# Patient Record
Sex: FEMALE | Race: WHITE | NOT HISPANIC OR LATINO | Employment: OTHER | URBAN - METROPOLITAN AREA
[De-identification: names, ages, dates, MRNs, and addresses within clinical notes are randomized per-mention and may not be internally consistent; named-entity substitution may affect disease eponyms.]

---

## 2017-03-09 ENCOUNTER — OFFICE VISIT (OUTPATIENT)
Dept: INTERNAL MEDICINE | Facility: CLINIC | Age: 67
End: 2017-03-09
Payer: MEDICARE

## 2017-03-09 ENCOUNTER — TELEPHONE (OUTPATIENT)
Dept: INTERNAL MEDICINE | Facility: CLINIC | Age: 67
End: 2017-03-09

## 2017-03-09 VITALS
SYSTOLIC BLOOD PRESSURE: 120 MMHG | OXYGEN SATURATION: 96 % | HEART RATE: 100 BPM | WEIGHT: 184.31 LBS | BODY MASS INDEX: 36.18 KG/M2 | DIASTOLIC BLOOD PRESSURE: 60 MMHG | HEIGHT: 60 IN

## 2017-03-09 DIAGNOSIS — J06.9 VIRAL URI WITH COUGH: Primary | ICD-10-CM

## 2017-03-09 PROCEDURE — 99213 OFFICE O/P EST LOW 20 MIN: CPT | Mod: PBBFAC | Performed by: INTERNAL MEDICINE

## 2017-03-09 PROCEDURE — 99999 PR PBB SHADOW E&M-EST. PATIENT-LVL III: CPT | Mod: PBBFAC,,, | Performed by: INTERNAL MEDICINE

## 2017-03-09 PROCEDURE — 99213 OFFICE O/P EST LOW 20 MIN: CPT | Mod: S$PBB,,, | Performed by: INTERNAL MEDICINE

## 2017-03-09 NOTE — PROGRESS NOTES
Internal Medicine    Subjective:      Patient ID: Daisy Severino is a 67 y.o. female.    Chief Complaint: Cough    HPI Comments: Presents for urgent visit for URI.      Last Saturday went to [place] and states she got very cold.  Monday around noon became nauseated and spit up a lot of phlegm.  States her ears and throat were sore.  Reports development of nasal congestion, runny nose, post-nasal drip, and non-productive cough.  She says she has been using a previously prescribed nasal spray that is helping some.  Vicks nasal rub also helps some.  Also taking Tylenol PRN for muscle aches.  Has been using some old antibiotic drops given to by her ENT last year.  Endorses chills but denies fever or night sweats.        Review of Systems   Constitutional: Positive for chills and fatigue. Negative for appetite change, fever and unexpected weight change.   HENT: Positive for congestion, postnasal drip, rhinorrhea and sore throat. Negative for ear pain (Ear congestion), hearing loss and trouble swallowing.    Eyes: Negative for visual disturbance.   Respiratory: Positive for cough. Negative for chest tightness, shortness of breath and wheezing.    Cardiovascular: Negative for chest pain, palpitations and leg swelling.   Gastrointestinal: Negative for abdominal pain.   Genitourinary: Negative for dysuria.   Musculoskeletal: Positive for myalgias.   Skin: Negative for rash.   Neurological: Negative for dizziness, weakness, numbness and headaches.   Hematological: Negative for adenopathy.   Psychiatric/Behavioral: Negative for behavioral problems.       Past medical history, surgical history, and family medical history reviewed and updated as appropriate.    Medications and allergies reviewed.     Objective:     /60  Pulse 100  Ht 5' (1.524 m)  Wt 83.6 kg (184 lb 4.9 oz)  SpO2 96%  BMI 35.99 kg/m2  Physical Exam   Constitutional: She is oriented to person, place, and time. She appears well-developed and  well-nourished. No distress.   HENT:   Head: Normocephalic and atraumatic.   Mouth/Throat: Oropharynx is clear and moist. No oropharyngeal exudate.   Some clear fluid behind bilateral TM's.  No erythema, discharge, or bulging TM's.  External ear canal's clear.     Eyes: Conjunctivae and EOM are normal. No scleral icterus.   Neck: No thyromegaly present.   Cardiovascular: Normal rate and regular rhythm.  Exam reveals no gallop and no friction rub.    No murmur heard.  Pulmonary/Chest: Effort normal and breath sounds normal. No respiratory distress. She has no wheezes. She has no rales.   Abdominal: Soft.   Musculoskeletal: She exhibits no edema or tenderness.   Lymphadenopathy:     She has no cervical adenopathy.   Neurological: She is alert and oriented to person, place, and time.   Skin: No rash noted.   Psychiatric: She has a normal mood and affect. Her behavior is normal.       Assessment:     1. Viral URI with cough        Plan:   Daisy was seen today for cough.    Diagnoses and all orders for this visit:    Viral URI with cough   Discussed over the counter options for symptoms - written instructions provided to patient.  Educated patient on signs/symptoms of bacterial infection.  Patient agrees to notify me if symptoms worsen, new symptoms start, or if she develops fever.      Return if symptoms worsen or fail to improve.    Celine Fuentes MD  Internal Medicine  [facility]

## 2017-03-09 NOTE — TELEPHONE ENCOUNTER
----- Message from Tammy Reed sent at 3/9/2017 12:24 PM CST -----  Contact: Self/[phone]  Type: Sooner appointment than  is able to schedule    When is the first available appointment? 5/16/2017    What is the nature of the appointment?Physical     What appointment type: Ep    Comments:Pt stated that is due for her Physical this month and needs refills on her medication pt wants to know if she can be seen before the next available. Please call and advise      Thank you

## 2017-03-09 NOTE — PATIENT INSTRUCTIONS
Muscle aches:  Tylenol (acetaminophen - do not take more than 3000mg per day)    Nasal decongestant:  1. Sudafed (pseudoephedrine or phenylephrine), 2. Afrin nasal spray (oxymetazoline) - do not use nasal spray longer than 3 days    Cough:  Dextromethorphan    DayQuil:  Acetaminophen, dextromethorphan, and phenylephrine  NyQuil:  Acetaminopheb, dextromethorphan, and doxylamine

## 2017-03-09 NOTE — MR AVS SNAPSHOT
[facility] - Internal Medicine  [address]  [place] [address]  Phone: [phone]  Fax: [phone]                  Daisy Severino   3/9/2017 3:00 PM   Office Visit    Description:  Female : 1950   Provider:  Celine Fuentes MD   Department:  [facility] - Internal Medicine           Reason for Visit     Cough                To Do List           Goals (5 Years of Data)     None      Follow-Up and Disposition     Return if symptoms worsen or fail to improve.      [facility]     [facility] On Call Nurse Care Line -  Assistance  Registered nurses in the [facility] On Call Center provide clinical advisement, health education, appointment booking, and other advisory services.  Call for this free service at [phone].             Medications           Message regarding Medications     Verify the changes and/or additions to your medication regime listed below are the same as discussed with your clinician today.  If any of these changes or additions are incorrect, please notify your healthcare provider.             Verify that the below list of medications is an accurate representation of the medications you are currently taking.  If none reported, the list may be blank. If incorrect, please contact your healthcare provider. Carry this list with you in case of emergency.           Current Medications     estradiol (ESTRACE) 0.5 MG tablet Take 1 tablet (0.5 mg total) by mouth once daily.    fish oil-omega-3 fatty acids 300-1,000 mg capsule Take 2 g by mouth once daily.    lisinopril (PRINIVIL,ZESTRIL) 20 MG tablet Take 1 tablet (20 mg total) by mouth once daily.    LORATADINE (CLARITIN ORAL) Take by mouth.    multivitamin (ONE DAILY MULTIVITAMIN) per tablet Take 1 tablet by mouth once daily.    triamterene-hydrochlorothiazide 37.5-25 mg (DYAZIDE) 37.5-25 mg per capsule Take 1 capsule by mouth every morning.           Clinical Reference Information           Your Vitals Were     BP Pulse  Height Weight SpO2 BMI    120/60 100 5' (1.524 m) 83.6 kg (184 lb 4.9 oz) 96% 35.99 kg/m2      Blood Pressure          Most Recent Value    BP  120/60      Allergies as of 3/9/2017     Codeine      Immunizations Administered on Date of Encounter - 3/9/2017     None      Instructions    Muscle aches:  Tylenol (acetaminophen - do not take more than 3000mg per day)    Nasal decongestant:  1. Sudafed (pseudoephedrine or phenylephrine), 2. Afrin nasal spray (oxymetazoline) - do not use nasal spray longer than 3 days    Cough:  Dextromethorphan    DayQuil:  Acetaminophen, dextromethorphan, and phenylephrine  NyQuil:  Acetaminopheb, dextromethorphan, and doxylamine         Language Assistance Services     ATTENTION: Language assistance services are available, free of charge. Please call [phone].      ATENCIÓN: Si habla español, tiene a simons disposición servicios gratuitos de asistencia lingüística. Llame al [phone].     CHÚ Ý: N?u b?n nói Ti?ng Vi?t, có các d?ch v? h? tr? ngôn ng? mi?n phí dành cho b?n. G?i s? [phone].         [facility] complies with applicable Federal civil rights laws and does not discriminate on the basis of race, color, national origin, age, disability, or sex.

## 2017-03-10 NOTE — TELEPHONE ENCOUNTER
----- Message from Glynn Ramirez sent at 3/10/2017  1:11 PM CST -----  Contact: Self [phone]  Type: Returning a call    Who left a message? Sylvia    When did the practice call? Yesterday    Comments:Please call back    Thanks

## 2017-03-21 ENCOUNTER — PATIENT MESSAGE (OUTPATIENT)
Dept: INTERNAL MEDICINE | Facility: CLINIC | Age: 67
End: 2017-03-21

## 2017-03-21 DIAGNOSIS — I10 ESSENTIAL HYPERTENSION: ICD-10-CM

## 2017-03-21 DIAGNOSIS — E78.5 HYPERLIPIDEMIA, UNSPECIFIED HYPERLIPIDEMIA TYPE: ICD-10-CM

## 2017-03-21 DIAGNOSIS — R73.03 BORDERLINE DIABETES MELLITUS: Primary | ICD-10-CM

## 2017-03-22 NOTE — TELEPHONE ENCOUNTER
Pt needs R refill 90 day supply. Please advise.  Pharmacy updated. Please advise pt's message as well.

## 2017-03-28 ENCOUNTER — TELEPHONE (OUTPATIENT)
Dept: INTERNAL MEDICINE | Facility: CLINIC | Age: 67
End: 2017-03-28

## 2017-03-28 RX ORDER — TRIAMTERENE AND HYDROCHLOROTHIAZIDE 37.5; 25 MG/1; MG/1
1 CAPSULE ORAL EVERY MORNING
Qty: 90 CAPSULE | Refills: 1 | Status: SHIPPED | OUTPATIENT
Start: 2017-03-28 | End: 2017-09-27 | Stop reason: SDUPTHER

## 2017-03-28 RX ORDER — LISINOPRIL 20 MG/1
20 TABLET ORAL DAILY
Qty: 90 TABLET | Refills: 1 | Status: SHIPPED | OUTPATIENT
Start: 2017-03-28 | End: 2017-09-29 | Stop reason: SDUPTHER

## 2017-03-28 NOTE — TELEPHONE ENCOUNTER
----- Message from Tammy Reed sent at 3/28/2017 11:06 AM CDT -----  Contact: Self/[phone]  Pt said that she is calling in regards to needing to speak with someone about checking the status of a message that she sent via [facility] on 3/21/2017 asking for some new rx's, pt stated that if she does not answer leave a message letting her know if everything is okay. Please call and advise            Thank you

## 2017-04-12 ENCOUNTER — TELEPHONE (OUTPATIENT)
Dept: INTERNAL MEDICINE | Facility: CLINIC | Age: 67
End: 2017-04-12

## 2017-04-12 NOTE — TELEPHONE ENCOUNTER
Called in pt's two prescriptions lisinopril and (DYAZIDE) to the [facility]. Pt's Rx was not received via e-prescribe and pt will run out of medication soon. Pt notified and will contact pt with any other questions or concerns.

## 2017-04-12 NOTE — TELEPHONE ENCOUNTER
----- Message from Fatimah Stevens sent at 4/11/2017 11:08 AM CDT -----  Contact: Self/[phone]  Pt is calling in regards to a problem with scripts and her mail order delivery for her prescriptions.  Please advise/

## 2017-04-18 ENCOUNTER — TELEPHONE (OUTPATIENT)
Dept: INTERNAL MEDICINE | Facility: CLINIC | Age: 67
End: 2017-04-18

## 2017-04-18 NOTE — TELEPHONE ENCOUNTER
----- Message from Demar Estrada sent at 4/17/2017 10:32 AM CDT -----  Contact: self   Patient is returning a phone call.  Who left a message for the patient: Sylvia   Does patient know what this is regarding:  RX refill   Comments:

## 2017-04-19 ENCOUNTER — TELEPHONE (OUTPATIENT)
Dept: INTERNAL MEDICINE | Facility: CLINIC | Age: 67
End: 2017-04-19

## 2017-04-19 NOTE — TELEPHONE ENCOUNTER
----- Message from Jaci Chan sent at 4/19/2017 10:35 AM CDT -----  Contact: patient [phone]  Pt said that she has been trying to return your call for several days and has been unable to reach you. She will send a message on the patient portal as well.

## 2017-05-11 DIAGNOSIS — Z79.890 POSTMENOPAUSAL HRT (HORMONE REPLACEMENT THERAPY): ICD-10-CM

## 2017-05-11 DIAGNOSIS — Z12.31 VISIT FOR SCREENING MAMMOGRAM: Primary | ICD-10-CM

## 2017-05-11 RX ORDER — ESTRADIOL 0.5 MG/1
0.5 TABLET ORAL DAILY
Qty: 90 TABLET | Refills: 3 | Status: CANCELLED | OUTPATIENT
Start: 2017-05-11 | End: 2018-05-11

## 2017-05-11 RX ORDER — ESTRADIOL 0.5 MG/1
0.5 TABLET ORAL DAILY
Qty: 90 TABLET | Refills: 3 | Status: SHIPPED | OUTPATIENT
Start: 2017-05-11 | End: 2018-04-03 | Stop reason: SDUPTHER

## 2017-05-16 ENCOUNTER — HOSPITAL ENCOUNTER (OUTPATIENT)
Dept: RADIOLOGY | Facility: HOSPITAL | Age: 67
Discharge: HOME | End: 2017-05-16
Attending: OBSTETRICS & GYNECOLOGY
Payer: MEDICARE

## 2017-05-16 DIAGNOSIS — Z12.31 VISIT FOR SCREENING MAMMOGRAM: ICD-10-CM

## 2017-05-16 PROCEDURE — 77063 BREAST TOMOSYNTHESIS BI: CPT | Mod: 26,,, | Performed by: RADIOLOGY

## 2017-05-16 PROCEDURE — 77067 SCR MAMMO BI INCL CAD: CPT | Mod: TC

## 2017-05-16 PROCEDURE — 77067 SCR MAMMO BI INCL CAD: CPT | Mod: 26,,, | Performed by: RADIOLOGY

## 2017-05-19 ENCOUNTER — OFFICE VISIT (OUTPATIENT)
Dept: INTERNAL MEDICINE | Facility: CLINIC | Age: 67
End: 2017-05-19
Payer: MEDICARE

## 2017-05-19 ENCOUNTER — HOSPITAL ENCOUNTER (OUTPATIENT)
Dept: RADIOLOGY | Facility: HOSPITAL | Age: 67
Discharge: HOME | End: 2017-05-19
Attending: INTERNAL MEDICINE
Payer: MEDICARE

## 2017-05-19 VITALS
BODY MASS INDEX: 37.23 KG/M2 | SYSTOLIC BLOOD PRESSURE: 120 MMHG | DIASTOLIC BLOOD PRESSURE: 60 MMHG | OXYGEN SATURATION: 98 % | HEIGHT: 60 IN | WEIGHT: 189.63 LBS | HEART RATE: 85 BPM

## 2017-05-19 DIAGNOSIS — Z12.11 SCREENING FOR MALIGNANT NEOPLASM OF COLON: ICD-10-CM

## 2017-05-19 DIAGNOSIS — K76.0 FATTY LIVER: ICD-10-CM

## 2017-05-19 DIAGNOSIS — I10 ESSENTIAL HYPERTENSION: ICD-10-CM

## 2017-05-19 DIAGNOSIS — R73.09 ELEVATED HEMOGLOBIN A1C: ICD-10-CM

## 2017-05-19 DIAGNOSIS — M54.2 NECK AND SHOULDER PAIN: ICD-10-CM

## 2017-05-19 DIAGNOSIS — M25.519 NECK AND SHOULDER PAIN: ICD-10-CM

## 2017-05-19 DIAGNOSIS — M54.2 NECK AND SHOULDER PAIN: Primary | ICD-10-CM

## 2017-05-19 DIAGNOSIS — Z00.00 ENCOUNTER FOR HEALTH MAINTENANCE EXAMINATION: ICD-10-CM

## 2017-05-19 DIAGNOSIS — E78.5 HYPERLIPIDEMIA, UNSPECIFIED HYPERLIPIDEMIA TYPE: ICD-10-CM

## 2017-05-19 DIAGNOSIS — M25.519 NECK AND SHOULDER PAIN: Primary | ICD-10-CM

## 2017-05-19 DIAGNOSIS — Z85.528 HISTORY OF RENAL CARCINOMA: ICD-10-CM

## 2017-05-19 DIAGNOSIS — Z78.0 POSTMENOPAUSAL: ICD-10-CM

## 2017-05-19 PROCEDURE — 72040 X-RAY EXAM NECK SPINE 2-3 VW: CPT | Mod: 26,,, | Performed by: RADIOLOGY

## 2017-05-19 PROCEDURE — 99999 PR PBB SHADOW E&M-EST. PATIENT-LVL IV: CPT | Mod: PBBFAC,,, | Performed by: INTERNAL MEDICINE

## 2017-05-19 PROCEDURE — 72040 X-RAY EXAM NECK SPINE 2-3 VW: CPT | Mod: TC

## 2017-05-19 PROCEDURE — 99214 OFFICE O/P EST MOD 30 MIN: CPT | Mod: S$PBB,,, | Performed by: INTERNAL MEDICINE

## 2017-05-19 RX ORDER — METFORMIN HYDROCHLORIDE 500 MG/1
500 TABLET ORAL
Qty: 90 TABLET | Refills: 3 | Status: SHIPPED | OUTPATIENT
Start: 2017-05-19 | End: 2017-10-06

## 2017-05-19 NOTE — PROGRESS NOTES
Subjective:       Patient ID: Daisy Severino is a 67 y.o. female.    Chief Complaint: Annual Exam    HPI  68 y/o woman with HTN, HLD, borderline DM, h/o renal carcinoma here for annual exam; also concerned about new shoulder/neck pain. Her boyfriend is with her today at visit.    Shoulder and neck pain - started about two months ago in middle of neck, radiating to both shoulders, sometimes all the way down both hands. Currently worse on left side, sometimes also on right. Pain is aching, not burning, shocklike, or pins & needles. No numbness.   Few weeks ago had pain significant enough that she wasn't able to raise her arms; also sometimes feels she has weakness in her arm. Has tried massage.   No recent injury, strain. Is concerned about problems caused by repetitive movements.   Does take ibuprofen, but only if she is in excruciating pain.  Icing regularly which she feels helps better than heat.  Has tried OTC topical treatments - capsaicin (too much burning), Dr Cavazos's. Also bought [employer] cream.  Has had similar pain in the past, years ago - around 5378-2542 after an injury/strain.     HTN - taking lisinopril 20mg, triamterene-HCTZ 37.5-35mg, following low salt diet. No headache, vision changes, chest pain, or dyspnea.  Not currently checking BP at home.     H/o HLD - , HDL 56,  on recent labs. Planning to continue working on diet/exercise.    Borderline DM, has A1c followed regularly; most recently 6.2 on 5/16/17.  Doing some morning stretches but not otherwise exercise    H/o renal carcinoma - diagnosed with ultrasound and follow-up CT, stage I carcinoma. Had complete nephrectomy with Dr Stepan Ramirez; surgery and post-op course complicated with ICU stay, collapsed lung.  Saw Dr Ramirez 8/2016, planned for ultrasound, labs - instructed to f/u in 1 year.    Ultrasound done last year at  showed possible fatty liver - liver was normal size but increased echogenicity.    Interested in  working on weight loss, but wants to work on her shoulder pain.   Used to do Ron before her renal cancer diagnosis.    Takes hormone therapy for menopausal symptoms, for >10 years; at visit last year with Gyn was planning to wean this down.    HCM  DEXA - normal years ago, due for this  Mammogram done 3/2016  Pap - had ASCUS on pap last year, follows with Dr Wiedemann  Doesn't want to get colonoscopy yet, asks about cologuard.    Review of Systems   Constitutional: Negative for activity change and fever.   HENT: Negative for congestion and sore throat.    Eyes: Negative for visual disturbance.   Respiratory: Negative for cough and shortness of breath.    Cardiovascular: Negative for chest pain, palpitations and leg swelling.   Gastrointestinal: Negative.  Negative for abdominal pain and blood in stool.   Endocrine: Negative.    Genitourinary: Negative.    Musculoskeletal: Positive for arthralgias, back pain and neck pain. Negative for gait problem and joint swelling.   Skin: Negative for rash.   Neurological: Negative for weakness and numbness.   Psychiatric/Behavioral: Negative for dysphoric mood. The patient is not nervous/anxious.          Past medical history, surgical history, and family medical history reviewed and updated as appropriate.    Medications and allergies reviewed.     Objective:          Vitals:    05/19/17 1448   BP: 120/60   Pulse: 85   SpO2: 98%   Weight: 86 kg (189 lb 9.5 oz)   Height: 5' (1.524 m)     Body mass index is 37.03 kg/(m^2).  Physical Exam   Constitutional: She is oriented to person, place, and time. She appears well-developed and well-nourished. No distress.   Pleasant overweight woman, comfortable and conversant.    HENT:   Head: Normocephalic and atraumatic.   Nose: Nose normal. No mucosal edema.   Mouth/Throat: Oropharynx is clear and moist. No posterior oropharyngeal erythema.   Eyes: Conjunctivae and EOM are normal. Pupils are equal, round, and reactive to light. No scleral  icterus.   Neck: Normal range of motion. Neck supple. No JVD present. No thyromegaly present.   Cardiovascular: Normal rate, regular rhythm, normal heart sounds and intact distal pulses.    No murmur heard.  Pulmonary/Chest: Effort normal and breath sounds normal. No respiratory distress.   Abdominal: Soft. Bowel sounds are normal. She exhibits no distension. There is no tenderness.   Musculoskeletal: Normal range of motion. She exhibits tenderness (tenderness diffusely across shoulders and base of neck). She exhibits no edema.   Full ROM of shoulders and neck though with some pain/stiffness. Neg drop arm test. Pain but not weakness with empty can test, bilateral.   Lymphadenopathy:     She has no cervical adenopathy.   Neurological: She is alert and oriented to person, place, and time. She has normal strength. No cranial nerve deficit or sensory deficit. Gait normal.   Skin: Skin is warm and dry. No rash noted. She is not diaphoretic.   Psychiatric: She has a normal mood and affect.   Vitals reviewed.      Lab Results   Component Value Date    WBC 7.49 05/16/2017    HGB 14.4 05/16/2017    HCT 44.7 05/16/2017     05/16/2017    CHOL 241 (H) 05/16/2017    TRIG 177 (H) 05/16/2017    HDL 56 05/16/2017    ALT 19 05/16/2017    AST 19 05/16/2017     05/16/2017    K 4.7 05/16/2017     05/16/2017    CREATININE 1.2 05/16/2017    BUN 32 (H) 05/16/2017    CO2 26 05/16/2017    HGBA1C 6.2 05/16/2017       Assessment:       1. Neck and shoulder pain    2. Elevated hemoglobin A1c    3. Fatty liver    4. Encounter for health maintenance examination    5. Screening for malignant neoplasm of colon    6. Postmenopausal    7. Essential hypertension    8. Hyperlipidemia, unspecified hyperlipidemia type    9. History of renal carcinoma        Plan:   Daisy was seen today for annual exam.    Diagnoses and all orders for this visit:    Neck and shoulder pain - suspect muscle spasm, possible rotator cuff tendonitis due to  overuse; however, given some midline tenderness and that she is concerned about bone lesions related to h/o renal cancer, will check c-spine xray to further evaluate.  Recommended avoidance of overuse / exacerbating activities, but +gentle ROM exercises. Ok to continue tylenol; she will check with her urologist re: use of topical NSAIDs (voltaren or OTC preparations). Continue coldpack. Referring to PMR as well  -     X-Ray Cervical Spine AP And Lateral; Future  -     Ambulatory Referral to Physical Medicine Rehab    Elevated hemoglobin A1c - discussed options for treatment / monitoring; after in-depth discussion, she agrees to start low-dose metformin in addition to working on diet/exercise changes. Recheck A1c in 6 months  -     metformin (GLUCOPHAGE) 500 MG tablet; Take 1 tablet (500 mg total) by mouth daily with breakfast.  -     Hemoglobin A1c; Future    Fatty liver - counseled re: fatty liver, working on healthy diet/exercise and weight loss; she is motivated to do this. Will monitor LFTs q6 months; she gets ultrasound yearly for kidney monitoring.  -     Lipid panel; Future  -     Comprehensive metabolic panel; Future    Encounter for health maintenance examination  -     Pneumococcal Conjugate Vaccine (13 Valent) (IM)    Screening for malignant neoplasm of colon  -     Occult Blood Stool, CA Screen; Future  -     Occult Blood Stool, CA Screen; Future  -     Occult Blood Stool, CA Screen; Future    Postmenopausal  -     DXA Bone Density Spine And Hip; Future    Essential hypertension - at goal, continue current meds    Hyperlipidemia, unspecified hyperlipidemia type - 10yr ASCVD risk 7.2% and she does not want to start a statin, not strictly recommended by guidelines. Discussed importance of diet/lifestyle changes. Will check lipids with labs in 6 months.     History of renal carcinoma - continue to follow with Urology.     Health maintenance reviewed with patient as noted above.     Return in about 6 months  (around 11/19/2017) for follow up .    Hammad Ware MD  Internal Medicine  [facility]  5/19/2017

## 2017-05-19 NOTE — MR AVS SNAPSHOT
[facility]  [address]  [place] [address]  Phone: [phone]  Fax: [phone]                  Daisy Severino   2017 3:00 PM   Office Visit    Description:  Female : 1950   Provider:  Hammad Ware MD   Department:  [facility]           Reason for Visit     Annual Exam           Diagnoses this Visit        Comments    Neck and shoulder pain    -  Primary     Elevated hemoglobin A1c         Encounter for health maintenance examination         Screening for malignant neoplasm of colon         Fatty liver         Postmenopausal                To Do List           Goals (5 Years of Data)     None      Follow-Up and Disposition     Return in about 6 months (around 2017) for follow up .       These Medications        Disp Refills Start End    metformin (GLUCOPHAGE) 500 MG tablet 90 tablet 3 2017    Take 1 tablet (500 mg total) by mouth daily with breakfast. - Oral    Pharmacy: [facility] - [place], [place] - [address] AT [address] [facility] Ph #: [phone]         [facility]     [facility] On Call Nurse Care Line -  Assistance  Unless otherwise directed by your provider, please contact [facility], our nurse care line that is available for  assistance.     Registered nurses in the [facility] On Call Center provide: appointment scheduling, clinical advisement, health education, and other advisory services.  Call: [phone] (toll free)               Medications           Message regarding Medications     Verify the changes and/or additions to your medication regime listed below are the same as discussed with your clinician today.  If any of these changes or additions are incorrect, please notify your healthcare provider.        START taking these NEW medications        Refills    metformin (GLUCOPHAGE) 500 MG tablet 3    Sig: Take 1 tablet (500 mg total) by  mouth daily with breakfast.    Class: Normal    Route: Oral           Verify that the below list of medications is an accurate representation of the medications you are currently taking.  If none reported, the list may be blank. If incorrect, please contact your healthcare provider. Carry this list with you in case of emergency.           Current Medications     estradiol (ESTRACE) 0.5 MG tablet Take 1 tablet (0.5 mg total) by mouth once daily.    fish oil-omega-3 fatty acids 300-1,000 mg capsule Take 2 g by mouth once daily.    lisinopril (PRINIVIL,ZESTRIL) 20 MG tablet Take 1 tablet (20 mg total) by mouth once daily.    LORATADINE (CLARITIN ORAL) Take by mouth.    multivitamin (ONE DAILY MULTIVITAMIN) per tablet Take 1 tablet by mouth once daily.    triamterene-hydrochlorothiazide 37.5-25 mg (DYAZIDE) 37.5-25 mg per capsule Take 1 capsule by mouth every morning.    metformin (GLUCOPHAGE) 500 MG tablet Take 1 tablet (500 mg total) by mouth daily with breakfast.           Clinical Reference Information           Your Vitals Were     BP Pulse Height Weight SpO2 BMI    120/60 5 5' (1.524 m) 86 kg (189 lb 9.5 oz) 98% 37.03 kg/m2      Blood Pressure          Most Recent Value    BP  120/60      Allergies as of 5/19/2017     Codeine      Immunizations Administered on Date of Encounter - 5/19/2017     Name Date Dose VIS Date Route    Pneumococcal Conjugate - 13 Valent 5/19/2017 0.5 mL 11/5/2015 Intramuscular      Orders Placed During Today's Visit      Normal Orders This Visit    Ambulatory Referral to Physical Medicine Rehab     Pneumococcal Conjugate Vaccine (13 Valent) (IM)     Future Labs/Procedures Expected by Expires    Comprehensive metabolic panel  5/19/2017 5/19/2018    DXA Bone Density Spine And Hip  5/19/2017 8/17/2017    Lipid panel  5/19/2017 7/18/2018    Occult Blood Stool, CA Screen  5/19/2017 5/19/2018    Occult Blood Stool, CA Screen  5/19/2017 5/19/2018    Occult Blood Stool, CA Screen  5/19/2017  5/19/2018    X-Ray Cervical Spine AP And Lateral  5/19/2017 8/17/2017    Hemoglobin A1c  11/15/2017 (Approximate) 12/25/2017      Instructions      Understanding Carbohydrates, Fats, and Protein  Food is a source of fuel and nourishment for your body. Its also a source of pleasure. Having diabetes doesnt mean you have to eat special foods or give up desserts. Instead, your dietitian can show you how to plan meals to suit your body. To start, learn how different foods affect blood sugar.  Carbohydrates  Carbohydrates are the main source of fuel for the body. Carbohydrates raise blood sugar. Many people think carbohydrates are only found in pasta or bread. But carbohydrates are actually in many kinds of foods:  · Sugars occur naturally in foods such as fruit, milk, honey, and molasses. Sugars can also be added to many foods, from cereals and yogurt to candy and desserts. Sugars raise blood sugar.  · Starches are found in bread, cereals, pasta, and dried beans. Theyre also found in corn, peas, potatoes, yam, acorn squash, and butternut squash. Starches also raise blood sugar.   · Fiber is found in foods such as vegetables, fruits, beans, and whole grains. Unlike other carbs, fiber isnt digested or absorbed. So it doesnt raise blood sugar. In fact, fiber can help keep blood sugar from rising too fast. It also helps keep blood cholesterol at a healthy level.  Did you know?  Even though carbohydrates raise blood sugar, its best to have some in every meal. They are an important part of a healthy diet.   Fat  Fat is an energy source that can be stored until needed. Fat does not raise blood sugar. However, it can raise blood cholesterol, increasing the risk of heart disease. Fat is also high in calories, which can cause weight gain. Not all types of fat are the same.  More Healthy:  · Monounsaturated fats are mostly found in vegetable oils, such as olive, canola, and peanut oils. They are also found in avocados and  some nuts. Monounsaturated fats are healthy for your heart. Thats because they lower LDL (unhealthy) cholesterol.  · Polyunsaturated fats are mostly found in vegetable oils, such as corn, safflower, and soybean oils. They are also found in some seeds, nuts, and fish. Polyunsaturated fats lower LDL (unhealthy) cholesterol. So, choosing them instead of saturated fats is healthy for your heart. Certain unsaturated fats can help lower triglycerides.   Less Healthy:  · Saturated fats are found in animal products, such as meat, poultry, whole milk, lard, and butter. Saturated fats raise LDL cholesterol and are not healthy for your heart.  · Hydrogenated oils and trans fats are formed when vegetable oils are processed into solid fats. They are found in many processed foods. Hydrogenated oils and trans fats raise LDL cholesterol and lower HDL (healthy) cholesterol. They are not healthy for your heart.  Protein  Protein helps the body build and repair muscle and other tissue. Protein has little or no effect on blood sugar. However, many foods that contain protein also contain saturated fat. By choosing low-fat protein sources, you can get the benefits of protein without the extra fat:  · Plant protein is found in dry beans and peas, nuts, and soy products, such as tofu and soymilk. These sources tend to be cholesterol-free and low in saturated fat.  · Animal protein is found in fish, poultry, meat, cheese, milk, and eggs. These contain cholesterol and can be high in saturated fat. Aim for lean, lower-fat choices.  Date Last Reviewed: 3/1/2016  © 2139-5136 [employer]. [address] [place] [address], [place] [address]. All rights reserved. This information is not intended as a substitute for professional medical care. Always follow your healthcare professional's instructions.            Metformin tablets  What is this medicine?  METFORMIN (met FOR min) is used to treat type 2 diabetes. It helps to control blood sugar.  Treatment is combined with diet and exercise. This medicine can be used alone or with other medicines for diabetes.  How should I use this medicine?  Take this medicine by mouth. Take it with meals. Swallow the tablets with a drink of water. Follow the directions on the prescription label. Take your medicine at regular intervals. Do not take your medicine more often than directed.  Talk to your pediatrician regarding the use of this medicine in children. While this drug may be prescribed for children as young as 10 years of age for selected conditions, precautions do apply.  What side effects may I notice from receiving this medicine?  Side effects that you should report to your doctor or health care professional as soon as possible:  · allergic reactions like skin rash, itching or hives, swelling of the face, lips, or tongue  · breathing problems  · feeling faint or lightheaded, falls  · muscle aches or pains  · signs and symptoms of low blood sugar such as feeling anxious, confusion, dizziness, increased hunger, unusually weak or tired, sweating, shakiness, cold, irritable, headache, blurred vision, fast heartbeat, loss of consciousness  · slow or irregular heartbeat  · unusual stomach pain or discomfort  · unusually tired or weak  Side effects that usually do not require medical attention (report to your doctor or health care professional if they continue or are bothersome):  · diarrhea  · headache  · heartburn  · metallic taste in mouth  · nausea  · stomach gas, upset  What may interact with this medicine?  Do not take this medicine with any of the following medications:  · dofetilide  · gatifloxacin  · certain contrast medicines given before X-rays, CT scans, MRI, or other procedures  This medicine may also interact with the following medications:  · acetazolamide  · certain medicines for HIV infection or hepatitis, like adefovir, emtricitabine, entecavir, lamivudine, or  tenofovir  · cimetidine  · crizotinib  · digoxin  · diuretics  · female hormones, like estrogens or progestins and birth control pills  · glycopyrrolate  · isoniazid  · lamotrigine  · medicines for blood pressure, heart disease, irregular heart beat  · memantine  · midodrine  · methazolamide  · morphine  · nicotinic acid  · phenothiazines like chlorpromazine, mesoridazine, prochlorperazine, thioridazine  · phenytoin  · procainamide  · propantheline  · quinidine  · quinine  · ranitidine  · ranolazine  · steroid medicines like prednisone or cortisone  · stimulant medicines for attention disorders, weight loss, or to stay awake  · thyroid medicines  · topiramate  · trimethoprim  · trospium  · vancomycin  · vandetanib  · zonisamide  What if I miss a dose?  If you miss a dose, take it as soon as you can. If it is almost time for your next dose, take only that dose. Do not take double or extra doses.  Where should I keep my medicine?  Keep out of the reach of children.  Store at room temperature between 15 and 30 degrees C (59 and 86 degrees F). Protect from moisture and light. Throw away any unused medicine after the expiration date.  What should I tell my health care provider before I take this medicine?  They need to know if you have any of these conditions:  · anemia  · frequently drink alcohol-containing beverages  · become easily dehydrated  · heart attack  · heart failure that is treated with medications  · kidney disease  · liver disease  · polycystic ovary syndrome  · serious infection or injury  · vomiting  · an unusual or allergic reaction to metformin, other medicines, foods, dyes, or preservatives  · pregnant or trying to get pregnant  · breast-feeding  What should I watch for while using this medicine?  Visit your doctor or health care professional for regular checks on your progress.  A test called the HbA1C (A1C) will be monitored. This is a simple blood test. It measures your blood sugar control over the  last 2 to 3 months. You will receive this test every 3 to 6 months.  Learn how to check your blood sugar. Learn the symptoms of low and high blood sugar and how to manage them.  Always carry a quick-source of sugar with you in case you have symptoms of low blood sugar. Examples include hard sugar candy or glucose tablets. Make sure others know that you can choke if you eat or drink when you develop serious symptoms of low blood sugar, such as seizures or unconsciousness. They must get medical help at once.  Tell your doctor or health care professional if you have high blood sugar. You might need to change the dose of your medicine. If you are sick or exercising more than usual, you might need to change the dose of your medicine.  Do not skip meals. Ask your doctor or health care professional if you should avoid alcohol. Many nonprescription cough and cold products contain sugar or alcohol. These can affect blood sugar.  This medicine may cause ovulation in premenopausal women who do not have regular monthly periods. This may increase your chances of becoming pregnant. You should not take this medicine if you become pregnant or think you may be pregnant. Talk with your doctor or health care professional about your birth control options while taking this medicine. Contact your doctor or health care professional right away if think you are pregnant.  If you are going to need surgery, a MRI, CT scan, or other procedure, tell your doctor that you are taking this medicine. You may need to stop taking this medicine before the procedure.  Wear a medical ID bracelet or chain, and carry a card that describes your disease and details of your medicine and dosage times.  Date Last Reviewed:   NOTE:This sheet is a summary. It may not cover all possible information. If you have questions about this medicine, talk to your doctor, pharmacist, or health care provider. Copyright© 2016 [employer]             Language Assistance  Services     ATTENTION: Language assistance services are available, free of charge. Please call [phone].      ATENCIÓN: Si habla español, tiene a simons disposición servicios gratuitos de asistencia lingüística. Llame al [phone].     CHÚ Ý: N?u b?n nói Ti?ng Vi?t, có các d?ch v? h? tr? ngôn ng? mi?n phí dành cho b?n. G?i s? [phone].         [facility] complies with applicable Federal civil rights laws and does not discriminate on the basis of race, color, national origin, age, disability, or sex.

## 2017-05-19 NOTE — PATIENT INSTRUCTIONS
Understanding Carbohydrates, Fats, and Protein  Food is a source of fuel and nourishment for your body. Its also a source of pleasure. Having diabetes doesnt mean you have to eat special foods or give up desserts. Instead, your dietitian can show you how to plan meals to suit your body. To start, learn how different foods affect blood sugar.  Carbohydrates  Carbohydrates are the main source of fuel for the body. Carbohydrates raise blood sugar. Many people think carbohydrates are only found in pasta or bread. But carbohydrates are actually in many kinds of foods:  · Sugars occur naturally in foods such as fruit, milk, honey, and molasses. Sugars can also be added to many foods, from cereals and yogurt to candy and desserts. Sugars raise blood sugar.  · Starches are found in bread, cereals, pasta, and dried beans. Theyre also found in corn, peas, potatoes, yam, acorn squash, and butternut squash. Starches also raise blood sugar.   · Fiber is found in foods such as vegetables, fruits, beans, and whole grains. Unlike other carbs, fiber isnt digested or absorbed. So it doesnt raise blood sugar. In fact, fiber can help keep blood sugar from rising too fast. It also helps keep blood cholesterol at a healthy level.  Did you know?  Even though carbohydrates raise blood sugar, its best to have some in every meal. They are an important part of a healthy diet.   Fat  Fat is an energy source that can be stored until needed. Fat does not raise blood sugar. However, it can raise blood cholesterol, increasing the risk of heart disease. Fat is also high in calories, which can cause weight gain. Not all types of fat are the same.  More Healthy:  · Monounsaturated fats are mostly found in vegetable oils, such as olive, canola, and peanut oils. They are also found in avocados and some nuts. Monounsaturated fats are healthy for your heart. Thats because they lower LDL (unhealthy) cholesterol.  · Polyunsaturated fats are mostly  found in vegetable oils, such as corn, safflower, and soybean oils. They are also found in some seeds, nuts, and fish. Polyunsaturated fats lower LDL (unhealthy) cholesterol. So, choosing them instead of saturated fats is healthy for your heart. Certain unsaturated fats can help lower triglycerides.   Less Healthy:  · Saturated fats are found in animal products, such as meat, poultry, whole milk, lard, and butter. Saturated fats raise LDL cholesterol and are not healthy for your heart.  · Hydrogenated oils and trans fats are formed when vegetable oils are processed into solid fats. They are found in many processed foods. Hydrogenated oils and trans fats raise LDL cholesterol and lower HDL (healthy) cholesterol. They are not healthy for your heart.  Protein  Protein helps the body build and repair muscle and other tissue. Protein has little or no effect on blood sugar. However, many foods that contain protein also contain saturated fat. By choosing low-fat protein sources, you can get the benefits of protein without the extra fat:  · Plant protein is found in dry beans and peas, nuts, and soy products, such as tofu and soymilk. These sources tend to be cholesterol-free and low in saturated fat.  · Animal protein is found in fish, poultry, meat, cheese, milk, and eggs. These contain cholesterol and can be high in saturated fat. Aim for lean, lower-fat choices.  Date Last Reviewed: 3/1/2016  © 1826-3151 [employer]. [address] [place] [address]. All rights reserved. This information is not intended as a substitute for professional medical care. Always follow your healthcare professional's instructions.            Metformin tablets  What is this medicine?  METFORMIN (met FOR min) is used to treat type 2 diabetes. It helps to control blood sugar. Treatment is combined with diet and exercise. This medicine can be used alone or with other medicines for diabetes.  How should I use this  medicine?  Take this medicine by mouth. Take it with meals. Swallow the tablets with a drink of water. Follow the directions on the prescription label. Take your medicine at regular intervals. Do not take your medicine more often than directed.  Talk to your pediatrician regarding the use of this medicine in children. While this drug may be prescribed for children as young as 10 years of age for selected conditions, precautions do apply.  What side effects may I notice from receiving this medicine?  Side effects that you should report to your doctor or health care professional as soon as possible:  · allergic reactions like skin rash, itching or hives, swelling of the face, lips, or tongue  · breathing problems  · feeling faint or lightheaded, falls  · muscle aches or pains  · signs and symptoms of low blood sugar such as feeling anxious, confusion, dizziness, increased hunger, unusually weak or tired, sweating, shakiness, cold, irritable, headache, blurred vision, fast heartbeat, loss of consciousness  · slow or irregular heartbeat  · unusual stomach pain or discomfort  · unusually tired or weak  Side effects that usually do not require medical attention (report to your doctor or health care professional if they continue or are bothersome):  · diarrhea  · headache  · heartburn  · metallic taste in mouth  · nausea  · stomach gas, upset  What may interact with this medicine?  Do not take this medicine with any of the following medications:  · dofetilide  · gatifloxacin  · certain contrast medicines given before X-rays, CT scans, MRI, or other procedures  This medicine may also interact with the following medications:  · acetazolamide  · certain medicines for HIV infection or hepatitis, like adefovir, emtricitabine, entecavir, lamivudine, or tenofovir  · cimetidine  · crizotinib  · digoxin  · diuretics  · female hormones, like estrogens or progestins and birth control  pills  · glycopyrrolate  · isoniazid  · lamotrigine  · medicines for blood pressure, heart disease, irregular heart beat  · memantine  · midodrine  · methazolamide  · morphine  · nicotinic acid  · phenothiazines like chlorpromazine, mesoridazine, prochlorperazine, thioridazine  · phenytoin  · procainamide  · propantheline  · quinidine  · quinine  · ranitidine  · ranolazine  · steroid medicines like prednisone or cortisone  · stimulant medicines for attention disorders, weight loss, or to stay awake  · thyroid medicines  · topiramate  · trimethoprim  · trospium  · vancomycin  · vandetanib  · zonisamide  What if I miss a dose?  If you miss a dose, take it as soon as you can. If it is almost time for your next dose, take only that dose. Do not take double or extra doses.  Where should I keep my medicine?  Keep out of the reach of children.  Store at room temperature between 15 and 30 degrees C (59 and 86 degrees F). Protect from moisture and light. Throw away any unused medicine after the expiration date.  What should I tell my health care provider before I take this medicine?  They need to know if you have any of these conditions:  · anemia  · frequently drink alcohol-containing beverages  · become easily dehydrated  · heart attack  · heart failure that is treated with medications  · kidney disease  · liver disease  · polycystic ovary syndrome  · serious infection or injury  · vomiting  · an unusual or allergic reaction to metformin, other medicines, foods, dyes, or preservatives  · pregnant or trying to get pregnant  · breast-feeding  What should I watch for while using this medicine?  Visit your doctor or health care professional for regular checks on your progress.  A test called the HbA1C (A1C) will be monitored. This is a simple blood test. It measures your blood sugar control over the last 2 to 3 months. You will receive this test every 3 to 6 months.  Learn how to check your blood sugar. Learn the symptoms of  low and high blood sugar and how to manage them.  Always carry a quick-source of sugar with you in case you have symptoms of low blood sugar. Examples include hard sugar candy or glucose tablets. Make sure others know that you can choke if you eat or drink when you develop serious symptoms of low blood sugar, such as seizures or unconsciousness. They must get medical help at once.  Tell your doctor or health care professional if you have high blood sugar. You might need to change the dose of your medicine. If you are sick or exercising more than usual, you might need to change the dose of your medicine.  Do not skip meals. Ask your doctor or health care professional if you should avoid alcohol. Many nonprescription cough and cold products contain sugar or alcohol. These can affect blood sugar.  This medicine may cause ovulation in premenopausal women who do not have regular monthly periods. This may increase your chances of becoming pregnant. You should not take this medicine if you become pregnant or think you may be pregnant. Talk with your doctor or health care professional about your birth control options while taking this medicine. Contact your doctor or health care professional right away if think you are pregnant.  If you are going to need surgery, a MRI, CT scan, or other procedure, tell your doctor that you are taking this medicine. You may need to stop taking this medicine before the procedure.  Wear a medical ID bracelet or chain, and carry a card that describes your disease and details of your medicine and dosage times.  Date Last Reviewed:   NOTE:This sheet is a summary. It may not cover all possible information. If you have questions about this medicine, talk to your doctor, pharmacist, or health care provider. Copyright© 2016 [employer]

## 2017-05-22 ENCOUNTER — PATIENT MESSAGE (OUTPATIENT)
Dept: INTERNAL MEDICINE | Facility: CLINIC | Age: 67
End: 2017-05-22

## 2017-05-22 ENCOUNTER — TELEPHONE (OUTPATIENT)
Dept: INTERNAL MEDICINE | Facility: CLINIC | Age: 67
End: 2017-05-22

## 2017-05-22 DIAGNOSIS — R73.03 BORDERLINE DIABETES MELLITUS: ICD-10-CM

## 2017-05-22 DIAGNOSIS — E66.01 SEVERE OBESITY (BMI 35.0-35.9 WITH COMORBIDITY): ICD-10-CM

## 2017-05-22 DIAGNOSIS — I10 ESSENTIAL HYPERTENSION: Primary | ICD-10-CM

## 2017-05-22 DIAGNOSIS — E78.5 HYPERLIPIDEMIA, UNSPECIFIED HYPERLIPIDEMIA TYPE: ICD-10-CM

## 2017-05-22 NOTE — TELEPHONE ENCOUNTER
Please give patient contact information for Daisy Pittman (health ) and for the Medical Fitness program at [place]. Referral placed for med fitness program.  Message also sent to patient

## 2017-05-23 ENCOUNTER — PATIENT MESSAGE (OUTPATIENT)
Dept: UROLOGY | Facility: CLINIC | Age: 67
End: 2017-05-23

## 2017-05-24 ENCOUNTER — PATIENT MESSAGE (OUTPATIENT)
Dept: INTERNAL MEDICINE | Facility: CLINIC | Age: 67
End: 2017-05-24

## 2017-05-24 DIAGNOSIS — Z90.5 S/P NEPHRECTOMY: ICD-10-CM

## 2017-05-24 DIAGNOSIS — Z85.528 HISTORY OF RENAL CARCINOMA: Primary | ICD-10-CM

## 2017-05-25 ENCOUNTER — HOSPITAL ENCOUNTER (OUTPATIENT)
Dept: RADIOLOGY | Facility: CLINIC | Age: 67
Discharge: HOME | End: 2017-05-25
Attending: INTERNAL MEDICINE
Payer: MEDICARE

## 2017-05-25 DIAGNOSIS — Z78.0 POSTMENOPAUSAL: ICD-10-CM

## 2017-05-25 PROCEDURE — 77080 DXA BONE DENSITY AXIAL: CPT | Mod: TC

## 2017-05-25 PROCEDURE — 77080 DXA BONE DENSITY AXIAL: CPT | Mod: 26,,, | Performed by: INTERNAL MEDICINE

## 2017-05-26 ENCOUNTER — TELEPHONE (OUTPATIENT)
Dept: INTERNAL MEDICINE | Facility: CLINIC | Age: 67
End: 2017-05-26

## 2017-05-26 RX ORDER — DICLOFENAC SODIUM 10 MG/G
2 GEL TOPICAL DAILY
Qty: 100 G | Refills: 0 | Status: SHIPPED | OUTPATIENT
Start: 2017-05-26 | End: 2017-07-07

## 2017-06-01 ENCOUNTER — HOSPITAL ENCOUNTER (OUTPATIENT)
Dept: RADIOLOGY | Facility: HOSPITAL | Age: 67
Discharge: HOME | End: 2017-06-01
Attending: PHYSICAL MEDICINE & REHABILITATION
Payer: MEDICARE

## 2017-06-01 ENCOUNTER — INITIAL CONSULT (OUTPATIENT)
Dept: PHYSICAL MEDICINE AND REHAB | Facility: CLINIC | Age: 67
End: 2017-06-01
Payer: MEDICARE

## 2017-06-01 VITALS
DIASTOLIC BLOOD PRESSURE: 74 MMHG | WEIGHT: 189.63 LBS | HEIGHT: 60 IN | BODY MASS INDEX: 37.23 KG/M2 | HEART RATE: 72 BPM | SYSTOLIC BLOOD PRESSURE: 127 MMHG

## 2017-06-01 DIAGNOSIS — M54.2 NECK AND SHOULDER PAIN: ICD-10-CM

## 2017-06-01 DIAGNOSIS — M25.519 NECK AND SHOULDER PAIN: ICD-10-CM

## 2017-06-01 DIAGNOSIS — M54.12 CERVICAL RADICULAR PAIN: ICD-10-CM

## 2017-06-01 DIAGNOSIS — M47.22 OSTEOARTHRITIS OF SPINE WITH RADICULOPATHY, CERVICAL REGION: ICD-10-CM

## 2017-06-01 DIAGNOSIS — M54.12 CERVICAL RADICULAR PAIN: Primary | ICD-10-CM

## 2017-06-01 PROCEDURE — 73030 X-RAY EXAM OF SHOULDER: CPT | Mod: TC,LT

## 2017-06-01 PROCEDURE — 99204 OFFICE O/P NEW MOD 45 MIN: CPT | Mod: S$PBB,,, | Performed by: PHYSICAL MEDICINE & REHABILITATION

## 2017-06-01 PROCEDURE — 99999 PR PBB SHADOW E&M-EST. PATIENT-LVL III: CPT | Mod: PBBFAC,,, | Performed by: PHYSICAL MEDICINE & REHABILITATION

## 2017-06-01 PROCEDURE — 73030 X-RAY EXAM OF SHOULDER: CPT | Mod: 26,LT,, | Performed by: RADIOLOGY

## 2017-06-01 RX ORDER — TRAMADOL HYDROCHLORIDE 50 MG/1
50 TABLET ORAL EVERY 8 HOURS PRN
Qty: 60 TABLET | Refills: 0 | Status: SHIPPED | OUTPATIENT
Start: 2017-06-01 | End: 2017-07-01

## 2017-06-01 RX ORDER — GABAPENTIN 100 MG/1
100 CAPSULE ORAL 3 TIMES DAILY
Qty: 90 CAPSULE | Refills: 1 | Status: SHIPPED | OUTPATIENT
Start: 2017-06-01 | End: 2018-04-03

## 2017-06-01 NOTE — LETTER
June 4, 2017      Hammad Ware MD  [address]  [facility] [address]           [facility]  [address]  [facility] [address]  Phone: [phone]          Patient: Daisy Severino   MR Number: [id number]   YOB: 1950   Date of Visit: 6/1/2017       Dear Dr. Hammad Ware:    Thank you for referring Daisy Severino to me for evaluation. Attached you will find relevant portions of my assessment and plan of care.    If you have questions, please do not hesitate to call me. I look forward to following Daisy Severino along with you.    Sincerely,        Enclosure  CC:  No Recipients    If you would like to receive this communication electronically, please contact [email or web address] or [phone] to request more information on [email or web address] Link access.    For providers and/or their staff who would like to refer a patient to [facility], please contact us through our one-stop-shop provider referral line, Destiny Quispe, at [phone].    If you feel you have received this communication in error or would no longer like to receive these types of communications, please e-mail [email or web address]
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
## 2017-06-02 ENCOUNTER — CLINICAL SUPPORT (OUTPATIENT)
Dept: INTERNAL MEDICINE | Facility: CLINIC | Age: 67
End: 2017-06-02
Payer: MEDICARE

## 2017-06-02 VITALS — BODY MASS INDEX: 37.33 KG/M2 | WEIGHT: 191.13 LBS

## 2017-06-02 NOTE — PROGRESS NOTES
Date:   6/2/17                   Time: 1300  Initial Health  Contact - [x]Clinic visit  []  Phone Visit  ASSEMENT: Information obtained through combination of chart review prior to seeing patient, patient self-report.   Primary Referral diagnosis/reason for referral:   weight loss, HTN, Cholesterol and glucose management      Other:    (See physician progress note for complete list of diagnoses.)  PCP:  Dr. Ware      Referent :  [x] PCP       [] Transition Navigator    []  ETATY    []  PRICE  []  Other:     Supports:  Boyfriend, Son 40 yrs old, daughter in [place]        Preferred Learning Style  (may be a combination)  []  Visual      [] Auditory     []  Reading    []  Hands-on    []  1:1    []  Group        []  Alone       []  No preference   [x]  Combination  []  Other:         Presenting issues from patients point of view:  [x]  Improve nutrition/increase healthy choices.                    [x]  Improve /maintain current functioning.  [x]  Obtain and maintain healthy blood pressure.                  []  Stop smoking.  [x]  Improved weight management.                                       [x]  Lower cholesterol.  [x]  Improved blood glucose management.                            [x]  Improve breathing.  [x]  Increase energy level.                                                      []  Increase/maintain independence.  []  Improve sleep.                                                                [x]  Decrease stress.  []  Improve pain management.                                             []  Improve mood.  []  Other:                  Pt. 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  [x]  Tries to modify meals so more healthy.  []  Exercises by        []  Takes BP         times a       (insert frequency)  []  Weighs self         (how often)  [x]  Takes all medications as prescribed.  [x]  Rarely misses health care appointments.  []  Sleeps 8 hours without waking more than twice.  []  Consistently wears/uses functioning aids as recommended  (ie glasses, hearing  aid, prosthesis,  walker,  wheelchair etc.)  []  Regularly engages in stress management activities I.e.:  []  Quit smoking   [x]  Purposefully educates self about health issues.  []  Pt did bring glucose log with him/her.  []  Other  (explain)           []  Comments:       []  Reported Blood Sugar Readings:          Health screenings   Last PCP visit:    17   GYN/Pap:   Hysterectomy 1988                          MM/16/17                                BMD:   17                      Colon: age 42 yrs old   Lipids 17   CMP 17   HgA1C: 17   Urine Microalbumin-Creatinine:             Eye Exam:    2017 [facility]   Foot Exam:  [facility]    Strengths:  [x]  Confident     [x]  Creative    [x]  Determined/persistent    [x]  Enthusiastic         [x]  Flexible  [x]  Good problem solving         [x]  Good self-control             [x]  Hard working        [x]  Hopeful/optimistic                [x]  Intelligent                         [x]  Pos support network son, boyfriend  [x]  Self-aware                           [x]  Sense of humor               [x]  Open/willing       [x]  Spiritual                                [x] Values health    [x]  Successful overcoming difficult challenges in the past.             [x]   Health literate (The degree to which individuals have the capacity to obtain, process, and    understand basic health information and services needed to make appropriate health decisions.- Dept. of Health and Human services.)  []   Other Comments:             Change Markers  Scale 1-10 with 10 representing most important, most confident.     [] NA at this time.    Readiness: 10    ;  Importance: 10    ;  Confidence: 10       INTERVENTIONS:  [x] Given an explanation about health coaching program and invited participation.  [x] Listened reflectively; provided support, encouragement, and validation; respected  and maintained patient self-direction; explored pros/cons of change; personalized health risks of maintaining the status quo; and facilitated recognition of discrepancy between current behaviors and patients goals and values.  [x] Assessed stage of change and employed appropriate motivational interviewing strategies to facilitate movement toward the next stage of change and healthy behavior modification.  [x] Normalized occurrence of relapse, helped patient recognize outcome of new self-learning as a result of the relapse such as triggers, and helped focus on factors to reduce chances of returning to previous behaviors .  [x] Used readiness scale ratings to guide assessment of importance and confidence of successfully reaching goals.  [x] Assisted patient to develop goal, action plan, and address potential barriers to goal     achievement.  [] Patient was given a new (insert glucose meter or other supplies), taught to use, and      successfully demonstrated ability to carry out essential steps of process.    [] Rx for ( monitor/supplies, pen needles, etc.) was obtained.  [x] Provided educational review, written materials/handouts (Meal Planning Counting Carbs, Healthy Plate, 10 Rules for Eating Safely for Life, 10 Tips to Cutting Your Cravings, Probiotics).   [x] Topics discussed/provided:    GI of foods and how it affects your metabolism and helps you burn fat, carb counting    [x] Facilitated completion of needed exams and screenings by reviewing Diabetic/Health Maintenance Report Card with patient.  [x] Provided pt with my business card.  [x] Informed of/reviewed how to access health  and after hours assistance.  [x] Discussed, planned, and scheduled future  contacts.  [x]Challenges/Barriers identified discussed as identified by patient.  [x] Needs identified by this Health :    Education and support     [] Other:            For additional care management support patient was referred to:        [x]  Community resources for [employer] program, just started                        []  Medication assistance programs               []  Dietician              []  Diabetes  Boot Camp                                        []  Mental health services                           []                  []  Diabetes [place]                                              []  Home health services                                []  Complex case management              []  PCP/covering provider due to                 []  Emergency Dept.                                  []  GYN              []  Optometrist/ Ophthalmologist                          []  Podiatrist                                                      [x]  N/A        []  Other:           RESPONSE/IMPRESSION  Behavior is consistent with the following stage of change:  []  Pre-contemplation  []  Contemplation  [x]  Preparation  []  Action  []  Maintenance  []  Relapse    Level of participation:  []  Refused to participate  []  Guarded / resistant  []  Passive participant  [x]  Active participant/interactive  [x]  Interested/receptive  [x]  Self-motivated  []  Other          Goal developed with patient today:    Consistent about meals on a daily basis 10/10  Be aware of carbs and sugar content in foods 10/10  Eat better foods at night 10/10  Exercise regularly 10/10    Plan (needed actions to accomplish goal):          [x] Pt will work on action plan as stated above.        [x] Pt will follow up with Health  on 6/26/17 at 1300.       [x] Health  will remain available.  [x] Health  will maintain regular contacts with patient to educate, support, encourage; help problem-solve; assist with  development of self-advocacy/increasing independent health management; and provide resources as needed.  [] Pt has declined Health  Program at this time. Provided pt with Health  Program information should they decide to participate at a later time.        [] Pt to facilitate contact with Health        [] Health  to facilitate contact with patient.        [] Other:          Notes:   Met with patient she is interested in losing weight, lowering cholesterol and glucose and BP.   Her best reasons are to be here for her grandson and has a granddaughter on the way, protect her one kidney and improve her overall health.  She had left kidney cancer and removal 4/6/13.  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   Goals set by patient and will continue to work with patient to assist to reach her goals.     Best Method of Contact:  [] email:   [x] Phone:   [] Mail  [] Office     Daisy Pittman RN

## 2017-06-05 NOTE — PROGRESS NOTES
DEXA +osteopenia, not indicated for rx treatment currently. Repeat in 2-4 years. Result released and message sent to patient via [facility] with instruction to contact me if any questions.   Hammad Ware MD

## 2017-06-08 ENCOUNTER — PATIENT MESSAGE (OUTPATIENT)
Dept: PHYSICAL MEDICINE AND REHAB | Facility: CLINIC | Age: 67
End: 2017-06-08

## 2017-06-09 ENCOUNTER — TELEPHONE (OUTPATIENT)
Dept: PHYSICAL MEDICINE AND REHAB | Facility: CLINIC | Age: 67
End: 2017-06-09

## 2017-06-09 NOTE — TELEPHONE ENCOUNTER
Daisy Turner MD 12 hours ago (8:01 PM)         Dr. Turner,     I have cancelled my appointment for the MRI on Saturday, Alla 10, 2017 at 3PM.  My reason is twofold.  I would very much like to know what out of pocket cost I would incur after my insurance has covered the charges, as I know this is a very costly test.  If there is another way to determine if there is a pinched nerve, I would prefer to do that, which brings us to other issue.  My claustrophobia warrants that I take some sedation and I don't want to do that without someone to drive me.  I do not have anyone to assist me with that and would have to establish that in order to even reschedule if my Medicare and [employer] cover the charges for the MRI.     Thank you for your understanding and I will call your office, speak with your assistant as I need to reschedule my appointment on Monday the 19th of June due to my job.     Daisy Severino

## 2017-06-25 ENCOUNTER — PATIENT MESSAGE (OUTPATIENT)
Dept: INTERNAL MEDICINE | Facility: CLINIC | Age: 67
End: 2017-06-25

## 2017-06-26 ENCOUNTER — CLINICAL SUPPORT (OUTPATIENT)
Dept: INTERNAL MEDICINE | Facility: CLINIC | Age: 67
End: 2017-06-26
Payer: MEDICARE

## 2017-06-26 VITALS — BODY MASS INDEX: 36.86 KG/M2 | WEIGHT: 188.69 LBS

## 2017-06-30 ENCOUNTER — PATIENT MESSAGE (OUTPATIENT)
Dept: INTERNAL MEDICINE | Facility: CLINIC | Age: 67
End: 2017-06-30

## 2017-06-30 DIAGNOSIS — M54.2 NECK AND SHOULDER PAIN: ICD-10-CM

## 2017-06-30 DIAGNOSIS — M54.12 CERVICAL RADICULAR PAIN: ICD-10-CM

## 2017-06-30 DIAGNOSIS — M25.519 NECK AND SHOULDER PAIN: ICD-10-CM

## 2017-06-30 DIAGNOSIS — M47.22 OSTEOARTHRITIS OF SPINE WITH RADICULOPATHY, CERVICAL REGION: Primary | ICD-10-CM

## 2017-06-30 NOTE — LETTER
July 6, 2017    Daisy Severino  [address] 1972  [place] [address]             [facility]  [address]  [place] [address]  Phone: [phone]  Fax: [phone] To whom it may concern -     Ms Daisy Severino follows with me for primary care at the [facility]. I have referred her to a medical fitness program, and she is doing well with this; I do feel that it is contributing to improvements in her health and that she should continue with the gym membership including regular exercise and instruction.     Please feel free to contact me if any questions.  Shukri,  Hammad Ware MD

## 2017-07-07 NOTE — TELEPHONE ENCOUNTER
Patient requesting letter in support of continued gym membership -- letter composed, please let patient know and mail or have her .   Asked her to check with her insurance re: other topical NSAIDs that are covered without requiring coverage-determination letter / PA

## 2017-07-07 NOTE — TELEPHONE ENCOUNTER
The alternative for the pt's Rx Diclofenac is the Voltaren gel. Pt agrees to this Rx. Please send Rx to mail order.    Pharmacy updated to [facility].

## 2017-07-10 RX ORDER — DICLOFENAC SODIUM 10 MG/G
2 GEL TOPICAL DAILY PRN
Qty: 100 G | Refills: 1 | Status: SHIPPED | OUTPATIENT
Start: 2017-07-10 | End: 2020-05-07 | Stop reason: SDUPTHER

## 2017-07-12 ENCOUNTER — TELEPHONE (OUTPATIENT)
Dept: INTERNAL MEDICINE | Facility: CLINIC | Age: 67
End: 2017-07-12

## 2017-07-12 NOTE — TELEPHONE ENCOUNTER
Returned Dale's/ Ronnie Script call, Dale was unavailable but spoke with Vanessa. Vanessa was unable to locate pt in the System. Questions about pt Diclofenac medication will go unanswered.

## 2017-07-12 NOTE — TELEPHONE ENCOUNTER
----- Message from Jojo Reyna sent at 7/12/2017  8:21 AM CDT -----  Contact: Ronnie Hunter  Mr. Hunter is calling regarding a medication, Diclofenac.  Mr. Hunter has questions about it and would like to speak with Staff.    He can be reached at [phone].    Thank you.

## 2017-07-17 ENCOUNTER — PATIENT MESSAGE (OUTPATIENT)
Dept: INTERNAL MEDICINE | Facility: CLINIC | Age: 67
End: 2017-07-17

## 2017-07-18 ENCOUNTER — PATIENT MESSAGE (OUTPATIENT)
Dept: UROLOGY | Facility: CLINIC | Age: 67
End: 2017-07-18

## 2017-07-21 ENCOUNTER — CLINICAL SUPPORT (OUTPATIENT)
Dept: INTERNAL MEDICINE | Facility: CLINIC | Age: 67
End: 2017-07-21
Payer: MEDICARE

## 2017-07-21 VITALS — WEIGHT: 188.94 LBS | BODY MASS INDEX: 36.9 KG/M2

## 2017-07-21 NOTE — PROGRESS NOTES
Health  Follow-Up Note   [x] Office  [] Phone  Notes from previous session reviewed.   [x] Previous Session Goals unchanged.   [] Patient/Caregiver Change in Goals.  Goals added or changed by Patient/Caregiver since program participation:  1.    Continue same plan      Additional/Changed support that patient/caregiver has experienced/sought?  (Indicate readiness, support from family, friends, others, community groups, etc)  1.   [facility] joined    Additional/Changed obstacles that could prevent patient/caregiver from reaching their goals?  1.  stress  2. Sons 40 th birthday (had cheat week)    Feedback provided:  1.  Praised for continued effort    Diagnostic values/Desriptors for follow-up as needed for chronic condition(s)   Weight: 85.7 kg 188 lb 14.9 oz gain 3.5 oz    Interventions:   1. Health  listened reflectively, validated thoughts and feelings, offered support and encouragement.   2. Allowed patient to express themselves in a non-biased atmosphere.  3. Health  assisted pt to problem-solve obstacles such as being in a challenging environment and dealing with these challenges.   4. Motivational Interviewed interventions utilized (OARS).   5. Patient responded favorably to interventions and remained actively engaged in the session.   6. Health  will remain available and connected for patient by phone and/or office visits.   7. Positive reinforcement, emotional support and encouragement provided.   8. Focused Education: MI, stress, exercise  Glucometer education and material provided:  Provided pt with Glucometer:   [] One Touch Ultra 2   [] Ultra 2 Mini with Delica Needles  [] Contour or Contour NEXT EZ Meter  [] Contour Breeze   [] Contour USB  [x] Accu-Check Violet Connect  [] Accu-Chek Eleni  [] Accu-Check Compak  [] Free-Style  Visual and hands-on demonstration of blood sugar check.   Pt instructed to use a new lancet with each use; wash hands or use alcohol swab before  puncturing finger with lancet; use an adequate supply of blood.   Times to perform glucose check as per MD orders, as indicated by medication use and as part of treatment for hypoglycemia.   Document readings and bring these readings to the next MD visit.   What is hypoglycemia and treatment.   Pt was able to accurately return demonstration of blood sugar check.   Reading obtained was   80   .   This reading is:   [] FBS; [] post meal; [x] Other:  predinner     .  Pt voiced understanding of glucometer use and purpose.     Plan:  [x] Pt will work on goals as stated above.   [x] Pt will contact Health  for any questions, concerns or needs.  [x] Pt will follow up with Health  in office will call to schedule after she checks her schedule      [] Pt will follow up with Health  on phone in:        [x] Health  will remain available.   [] Health  will contact patient by phone in:        [] Health  will consult:        [] Health  will inform Provider via EPIC messaging.     Impression:  1. Behavior is consistent with  Action     Stage of Change.   2. Participation level:       [x] Receptive      [x] Interactive      [] Guarded and Resistant      [x] Self Motivated      [] Refused/Declined to participate   3. [x] Pt voiced understanding of all information presented.       [] Pt voiced needing further information/education. This will be arranged.       [x] Pt would benefit from further education/information as identified by this health . This will be arranged.     Daisy Pittman RN HC

## 2017-07-24 DIAGNOSIS — R73.03 BORDERLINE DIABETES MELLITUS: Primary | ICD-10-CM

## 2017-07-26 RX ORDER — LANCETS
1 EACH MISCELLANEOUS DAILY
Qty: 50 EACH | Refills: 6 | Status: SHIPPED | OUTPATIENT
Start: 2017-07-26 | End: 2017-08-23 | Stop reason: SDUPTHER

## 2017-08-03 ENCOUNTER — TELEPHONE (OUTPATIENT)
Dept: INTERNAL MEDICINE | Facility: CLINIC | Age: 67
End: 2017-08-03

## 2017-08-03 NOTE — TELEPHONE ENCOUNTER
I called to give the ICd codes. I was told that the rx was denied x 2 attempts. A denial letter will sent to the patient and the doctor.

## 2017-08-03 NOTE — TELEPHONE ENCOUNTER
----- Message from Sammy Parada MA sent at 8/3/2017  7:50 AM CDT -----  Contact: kye PATTON [facility] [phone] Ref # [id number]  Requesting a diagnosis code for the Prior Auth for diclofenac sodium (VOLTAREN) 1 % Gel. Please call. Thanks!

## 2017-08-03 NOTE — TELEPHONE ENCOUNTER
Codes Comments    Osteoarthritis of spine with radiculopathy, cervical region  - Primary ICD-10-CM: M47.22  ICD-9-CM: 721.0    Neck and shoulder pain  ICD-10-CM: M54.2, M25.519  ICD-9-CM: 723.1, 719.41    Cervical radicular pain  ICD-10-CM: M54.12  ICD-9-CM:

## 2017-08-03 NOTE — TELEPHONE ENCOUNTER
----- Message from Amina Morales sent at 8/3/2017  9:00 AM CDT -----  Contact: Dr Azar Laboy with Medicare/[phone]  fax/[phone]  Dr Laboy called in regards needing to inform that a nidia authorization is getting ready to  in 3 hrs for patient rx diclofenac sodium (VOLTAREN) 1 % Gel. To please call at [phone] fax# [phone]. Please call and advise.       Thank you!!!

## 2017-08-17 ENCOUNTER — TELEPHONE (OUTPATIENT)
Dept: UROLOGY | Facility: CLINIC | Age: 67
End: 2017-08-17

## 2017-08-17 ENCOUNTER — TELEPHONE (OUTPATIENT)
Dept: INTERNAL MEDICINE | Facility: CLINIC | Age: 67
End: 2017-08-17

## 2017-08-17 ENCOUNTER — PATIENT MESSAGE (OUTPATIENT)
Dept: UROLOGY | Facility: CLINIC | Age: 67
End: 2017-08-17

## 2017-08-17 NOTE — TELEPHONE ENCOUNTER
----- Message from Kyra Smith sent at 8/17/2017  1:21 PM CDT -----  Contact: Pt  x  1st Request  _  2nd Request  _  3rd Request        Who: SHAQUILLE DAVID [[id number]]    Why: Pt is calling to touch base with clinical staff with regards to having her ultrasound scheduled just before she see Dr. Ramirez.  Pt is having an addition to her family and she's trying to workout the timing of both the new grand baby along with work and making her appointments.  Please contact pt to further discuss and advise.    What Number to Call Back:    When to Expect a call back: (With in 24 hours)

## 2017-08-18 ENCOUNTER — TELEPHONE (OUTPATIENT)
Dept: UROLOGY | Facility: CLINIC | Age: 67
End: 2017-08-18

## 2017-08-18 ENCOUNTER — PATIENT MESSAGE (OUTPATIENT)
Dept: INTERNAL MEDICINE | Facility: CLINIC | Age: 67
End: 2017-08-18

## 2017-08-18 DIAGNOSIS — C64.9 CANCER OF KIDNEY, UNSPECIFIED LATERALITY: Primary | ICD-10-CM

## 2017-08-18 DIAGNOSIS — K21.9 GASTROESOPHAGEAL REFLUX DISEASE, ESOPHAGITIS PRESENCE NOT SPECIFIED: Primary | ICD-10-CM

## 2017-08-18 NOTE — TELEPHONE ENCOUNTER
----- Message from Frieda Fitzpatrick sent at 8/18/2017  2:50 PM CDT -----  Contact: Pt  Pt called to speak to the nurse to request that an order be put into the system for an ultrasound and would like a call back.    Pt can be reached at [phone].    Thanks

## 2017-08-18 NOTE — TELEPHONE ENCOUNTER
LMOR that order is in [facility] dated 8/26/16.  Asked that she call back if unable to book appt from this.

## 2017-08-18 NOTE — TELEPHONE ENCOUNTER
----- Message from Krystal Bragg sent at 8/18/2017  2:58 PM CDT -----  Contact: pt  _  1st Request  _  2nd Request  _  3rd Request        Who: pt    Why: pt is requesting an order for US. Wants the appt for 9/12/17 or 9/14/17  in the morning. Please call pt    What Number to Call Back:[phone]    When to Expect a call back: (With in 24 hours)

## 2017-08-21 ENCOUNTER — HOSPITAL ENCOUNTER (OUTPATIENT)
Dept: RADIOLOGY | Facility: HOSPITAL | Age: 67
Discharge: HOME | End: 2017-08-21
Attending: UROLOGY
Payer: MEDICARE

## 2017-08-21 ENCOUNTER — TELEPHONE (OUTPATIENT)
Dept: INTERNAL MEDICINE | Facility: CLINIC | Age: 67
End: 2017-08-21

## 2017-08-21 ENCOUNTER — CLINICAL SUPPORT (OUTPATIENT)
Dept: INTERNAL MEDICINE | Facility: CLINIC | Age: 67
End: 2017-08-21
Payer: MEDICARE

## 2017-08-21 VITALS — WEIGHT: 183.88 LBS | BODY MASS INDEX: 35.91 KG/M2

## 2017-08-21 DIAGNOSIS — C64.9 CANCER OF KIDNEY, UNSPECIFIED LATERALITY: ICD-10-CM

## 2017-08-21 PROCEDURE — 71020 XR CHEST PA AND LATERAL: CPT | Mod: TC

## 2017-08-21 PROCEDURE — 71020 XR CHEST PA AND LATERAL: CPT | Mod: 26,,, | Performed by: RADIOLOGY

## 2017-08-21 NOTE — PROGRESS NOTES
Health  Follow-Up Note   [x] Office  [] Phone  Notes from previous session reviewed.   [x] Previous Session Goals unchanged.   [] Patient/Caregiver Change in Goals.  Goals added or changed by Patient/Caregiver since program participation:  1.  Continue same plan  2. Continue exercise gym        Additional/Changed support that patient/caregiver has experienced/sought?  (Indicate readiness, support from family, friends, others, community groups, etc)  1.  gym and friends.    Additional/Changed obstacles that could prevent patient/caregiver from reaching their goals?  1. Upset and down anniversary of husbands and mothers death  2. Was ill cold    Feedback provided:  1.  Praised for great job down 5 lbs total 8 lbs since 6/2/17.    Diagnostic values/Desriptors for follow-up as needed for chronic condition(s)   Weight: 83.4 kg 183 lb 13.8 oz  Blood Glucose:     Interventions:   1. Health  listened reflectively, validated thoughts and feelings, offered support and encouragement.   2. Allowed patient to express themselves in a non-biased atmosphere.  3. Health  assisted pt to problem-solve obstacles such as being in a challenging environment and dealing with these challenges.   4. Motivational Interviewed interventions utilized (OARS).   5. Patient responded favorably to interventions and remained actively engaged in the session.   6. Health  will remain available and connected for patient by phone and/or office visits.   7. Positive reinforcement, emotional support and encouragement provided.   8. Focused Education: MI    Plan:  [x] Pt will work on goals as stated above.   [x] Pt will contact Health  for any questions, concerns or needs.  [x] Pt will follow up with Health  in office on   9/18/17 at 1430.     [] Pt will follow up with Health  on phone in:        [x] Health  will remain available.   [] Health  will contact patient by phone in:        [] Health  will  consult:        [] Health  will inform Provider via EPIC messaging.     Impression:  1. Behavior is consistent with   Action    Stage of Change.   2. Participation level:       [x] Receptive      [x] Interactive      [] Guarded and Resistant      [x] Self Motivated      [] Refused/Declined to participate   3. [x] Pt voiced understanding of all information presented.       [] Pt voiced needing further information/education. This will be arranged.       [x] Pt would benefit from further education/information as identified by this health . This will be arranged.     Daisy Pittman RN HC

## 2017-08-23 ENCOUNTER — TELEPHONE (OUTPATIENT)
Dept: INTERNAL MEDICINE | Facility: CLINIC | Age: 67
End: 2017-08-23

## 2017-08-23 DIAGNOSIS — R73.03 BORDERLINE DIABETES MELLITUS: ICD-10-CM

## 2017-08-23 RX ORDER — LANCETS
1 EACH MISCELLANEOUS DAILY
Qty: 50 EACH | Refills: 6 | Status: SHIPPED | OUTPATIENT
Start: 2017-08-23 | End: 2017-09-28 | Stop reason: SDUPTHER

## 2017-08-23 NOTE — TELEPHONE ENCOUNTER
Note from patient reporting [facility] sent a letter saying that there were questions for MD regarding her voltaren gel and that they were unable to reach us. However, staff here spoke with [facility] re: the prior auth on 83/17, and the requested diagnosis codes were sent.    Please call [facility] again to clarify?

## 2017-08-23 NOTE — TELEPHONE ENCOUNTER
----- Message from Daisy Pittman RN sent at 8/21/2017  3:50 PM CDT -----  Regarding: strips and lancets sent to [facility] please  Hello,   Can you send the order for the strips and lancets to the [facility] pharmacy [address] [place] [id number]. She has to get under Medicare part B need escript with diagnosis code.   Thanks very much,   Daisy

## 2017-08-25 RX ORDER — OMEPRAZOLE 40 MG/1
40 CAPSULE, DELAYED RELEASE ORAL DAILY
Qty: 30 CAPSULE | Refills: 3 | Status: SHIPPED | OUTPATIENT
Start: 2017-08-25 | End: 2017-10-06

## 2017-08-28 NOTE — TELEPHONE ENCOUNTER
Called to see if patient was able to get strips left message request call back.   ..Daisy Pittman RN HC

## 2017-09-12 ENCOUNTER — HOSPITAL ENCOUNTER (OUTPATIENT)
Dept: RADIOLOGY | Facility: HOSPITAL | Age: 67
Discharge: HOME | End: 2017-09-12
Attending: UROLOGY
Payer: MEDICARE

## 2017-09-12 DIAGNOSIS — C64.9 CANCER OF KIDNEY, UNSPECIFIED LATERALITY: ICD-10-CM

## 2017-09-12 PROCEDURE — 76700 US EXAM ABDOM COMPLETE: CPT | Mod: 26,GC,, | Performed by: RADIOLOGY

## 2017-09-12 PROCEDURE — 76700 US EXAM ABDOM COMPLETE: CPT | Mod: TC

## 2017-09-14 ENCOUNTER — PATIENT MESSAGE (OUTPATIENT)
Dept: INTERNAL MEDICINE | Facility: CLINIC | Age: 67
End: 2017-09-14

## 2017-09-15 ENCOUNTER — OFFICE VISIT (OUTPATIENT)
Dept: UROLOGY | Facility: CLINIC | Age: 67
End: 2017-09-15
Attending: UROLOGY
Payer: MEDICARE

## 2017-09-15 VITALS
HEART RATE: 71 BPM | WEIGHT: 184.63 LBS | BODY MASS INDEX: 36.06 KG/M2 | DIASTOLIC BLOOD PRESSURE: 68 MMHG | SYSTOLIC BLOOD PRESSURE: 147 MMHG

## 2017-09-15 DIAGNOSIS — K76.9 LIVER LESION: ICD-10-CM

## 2017-09-15 DIAGNOSIS — C64.9 CANCER OF KIDNEY, UNSPECIFIED LATERALITY: Primary | ICD-10-CM

## 2017-09-15 LAB
BILIRUB SERPL-MCNC: NORMAL MG/DL
BLOOD URINE, POC: NORMAL
COLOR, POC UA: NORMAL
GLUCOSE UR QL STRIP: NORMAL
KETONES UR QL STRIP: NORMAL
LEUKOCYTE ESTERASE URINE, POC: NORMAL
NITRITE, POC UA: NORMAL
PH, POC UA: 5
PROTEIN, POC: NORMAL
SPECIFIC GRAVITY, POC UA: 1
UROBILINOGEN, POC UA: NORMAL

## 2017-09-15 PROCEDURE — 99214 OFFICE O/P EST MOD 30 MIN: CPT | Mod: S$GLB,,, | Performed by: UROLOGY

## 2017-09-15 PROCEDURE — 3077F SYST BP >= 140 MM HG: CPT | Mod: S$GLB,,, | Performed by: UROLOGY

## 2017-09-15 PROCEDURE — 3078F DIAST BP <80 MM HG: CPT | Mod: S$GLB,,, | Performed by: UROLOGY

## 2017-09-15 PROCEDURE — 81002 URINALYSIS NONAUTO W/O SCOPE: CPT | Mod: S$GLB,,, | Performed by: UROLOGY

## 2017-09-15 NOTE — PROGRESS NOTES
Subjective:      Daisy Severino is a 67 y.o. female who returns today regarding her     Yearly surveillance for previous renal cell carcinoma.  No symptoms of metastatic disease.  She had hematuria last year and we schedule cystoscopy.  However she declined to have this done and has not had any further hematuria..  She believes that she passed a small kidney stone with hematuria occurred    The following portions of the patient's history were reviewed and updated as appropriate: allergies, current medications, past family history, past medical history, past social history, past surgical history and problem list.    Review of Systems  Pertinent items are noted in HPI.  A comprehensive multipoint review of systems was negative except as otherwise stated in the HPI.     Objective:   Vitals: BP (!) 147/68 (BP Location: Left arm, Patient Position: Sitting, BP Method: Large (Automatic))   Pulse 71   Wt 83.7 kg (184 lb 10.2 oz)   BMI 36.06 kg/m²     Physical Exam   General: alert and oriented, no acute distress  Respiratory: Symmetric expansion, non-labored breathing  Cardiovascular: no peripheral edema  Abdomen: soft, non distended  Skin: normal coloration and turgor, no rashes, no suspicious skin lesions noted  Neuro: no gross deficits  Psych: normal judgment and insight, normal mood/affect and non-anxious    Physical Exam    Lab Review   Urinalysis demonstrates negative for all components  Lab Results   Component Value Date    WBC 7.49 05/16/2017    HGB 14.4 05/16/2017    HCT 44.7 05/16/2017    MCV 89 05/16/2017     05/16/2017     Lab Results   Component Value Date    CREATININE 1.2 05/16/2017    BUN 32 (H) 05/16/2017       Imaging  Ultrasound shows questionable liver lesion.  Otherwise unremarkable  Chest x-ray no evidence of metastatic disease    Assessment and Plan:   Cancer of kidney, unspecified laterality  conventional [id number] ROSA MARIA  -     POCT URINE DIPSTICK WITHOUT MICROSCOPE  -     Basic metabolic  panel; Future; Expected date: 09/15/2017  -     MRI Abdomen W WO Contrast    Liver lesion  -     MRI Abdomen W WO Contrast      Return to clinic after the above

## 2017-09-18 ENCOUNTER — CLINICAL SUPPORT (OUTPATIENT)
Dept: INTERNAL MEDICINE | Facility: CLINIC | Age: 67
End: 2017-09-18
Payer: MEDICARE

## 2017-09-18 ENCOUNTER — LAB VISIT (OUTPATIENT)
Dept: LAB | Facility: HOSPITAL | Age: 67
End: 2017-09-18
Attending: UROLOGY
Payer: MEDICARE

## 2017-09-18 VITALS — WEIGHT: 181.19 LBS | BODY MASS INDEX: 35.39 KG/M2

## 2017-09-18 DIAGNOSIS — C64.9 CANCER OF KIDNEY, UNSPECIFIED LATERALITY: ICD-10-CM

## 2017-09-18 LAB
ANION GAP SERPL CALC-SCNC: 12 MMOL/L
BUN SERPL-MCNC: 28 MG/DL
CALCIUM SERPL-MCNC: 10.5 MG/DL
CHLORIDE SERPL-SCNC: 104 MMOL/L
CO2 SERPL-SCNC: 26 MMOL/L
CREAT SERPL-MCNC: 1.1 MG/DL
EST. GFR  (AFRICAN AMERICAN): >60 ML/MIN/1.73 M^2
EST. GFR  (NON AFRICAN AMERICAN): 53 ML/MIN/1.73 M^2
GLUCOSE SERPL-MCNC: 100 MG/DL
POTASSIUM SERPL-SCNC: 4.3 MMOL/L
SODIUM SERPL-SCNC: 142 MMOL/L

## 2017-09-18 PROCEDURE — 36415 COLL VENOUS BLD VENIPUNCTURE: CPT

## 2017-09-18 PROCEDURE — 80048 BASIC METABOLIC PNL TOTAL CA: CPT

## 2017-09-18 NOTE — PROGRESS NOTES
Health  Follow-Up Note   [x] Office  [] Phone  Notes from previous session reviewed.   [x] Previous Session Goals unchanged.   [] Patient/Caregiver Change in Goals.  Goals added or changed by Patient/Caregiver since program participation:  1.     Continue same plan     Additional/Changed support that patient/caregiver has experienced/sought?  (Indicate readiness, support from family, friends, others, community groups, etc)  1.  Gym, son    Additional/Changed obstacles that could prevent patient/caregiver from reaching their goals?  1.  Work    Feedback provided:  1.  Praised for great job down 2.64 lb total now 10 lbs     Diagnostic values/Desriptors for follow-up as needed for chronic condition(s)   Weight: 82.2 kg 181 lb 3.5 oz    Interventions:   1. Health  listened reflectively, validated thoughts and feelings, offered support and encouragement.   2. Allowed patient to express themselves in a non-biased atmosphere.  3. Health  assisted pt to problem-solve obstacles such as being in a challenging environment and dealing with these challenges.   4. Motivational Interviewed interventions utilized (OARS).   5. Patient responded favorably to interventions and remained actively engaged in the session.   6. Health  will remain available and connected for patient by phone and/or office visits.   7. Positive reinforcement, emotional support and encouragement provided.   8. Focused Education: MI, exercise    Plan:  [x] Pt will work on goals as stated above.   [x] Pt will contact Health  for any questions, concerns or needs.  [x] Pt will follow up with Health  in office on  10/16/17 at 1430.      [] Pt will follow up with Health  on phone in:        [x] Health  will remain available.   [] Health  will contact patient by phone in:        [] Health  will consult:        [] Health  will inform Provider via EPIC messaging.     Impression:  1. Behavior is consistent with     Action   Stage of Change.   2. Participation level:       [x] Receptive      [x] Interactive      [] Guarded and Resistant      [x] Self Motivated      [] Refused/Declined to participate   3. [x] Pt voiced understanding of all information presented.       [] Pt voiced needing further information/education. This will be arranged.       [x] Pt would benefit from further education/information as identified by this health . This will be arranged.     Daisy Pittman RN HC

## 2017-09-20 ENCOUNTER — TELEPHONE (OUTPATIENT)
Dept: UROLOGY | Facility: CLINIC | Age: 67
End: 2017-09-20

## 2017-09-20 NOTE — TELEPHONE ENCOUNTER
----- Message from Tena Josue sent at 9/20/2017  8:37 AM CDT -----  Contact: SHAQUILLE DAVID [[id number]]  x_  1st Request  _  2nd Request  _  3rd Request        Who: SHAQUILLE DAVID [[id number]]    Why: Patient states she would like a call back from [facility] in regards her MRI that is scheduled for 9/22. She states she would like an open MRI.     What Number to Call Back: [phone]    When to Expect a call back: (Before the end of the day)   -- if call after 3:00 call back will be tomorrow.

## 2017-09-21 ENCOUNTER — TELEPHONE (OUTPATIENT)
Dept: UROLOGY | Facility: CLINIC | Age: 67
End: 2017-09-21

## 2017-09-21 ENCOUNTER — PATIENT MESSAGE (OUTPATIENT)
Dept: INTERNAL MEDICINE | Facility: CLINIC | Age: 67
End: 2017-09-21

## 2017-09-21 DIAGNOSIS — R73.03 BORDERLINE DIABETES MELLITUS: ICD-10-CM

## 2017-09-21 NOTE — TELEPHONE ENCOUNTER
----- Message from Aguilar Eaton sent at 9/21/2017 11:04 AM CDT -----  Contact: pt  x_ 1st Request   _ 2nd Request   _ 3rd Request     Who: SHAQUILLE DAVID [[id number]]    Why: pt is requesting a call back in reference to her options for open MRI.    What Number to Call Back: [phone]    When to Expect a call back: (Before the end of the day)   -- if call after 3:00 call back will be tomorrow.

## 2017-09-21 NOTE — TELEPHONE ENCOUNTER
----- Message from David Lozano sent at 9/21/2017  9:22 AM CDT -----  Contact: Pt  X_ 1st Request  _ 2nd Request  _ 3rd Request    Who:SHAQUILLE DAVID [[id number]]    Why: Patient would like to speak with staff in regards to confirming if her MRI tomorrow has been approved for the open MRI.    What Number to Call Back: [phone]    When to Expect a call back: (Before the end of the day)  -- if call after 3:00 call back will be tomorrow.

## 2017-09-21 NOTE — TELEPHONE ENCOUNTER
Spoke with MRI at main campus.  They no longer do open MRI or sedation.  Patient notified.  She will keep appt as scheduled

## 2017-09-27 DIAGNOSIS — I10 ESSENTIAL HYPERTENSION: ICD-10-CM

## 2017-09-27 RX ORDER — TRIAMTERENE AND HYDROCHLOROTHIAZIDE 37.5; 25 MG/1; MG/1
CAPSULE ORAL
Qty: 90 CAPSULE | Refills: 0 | Status: SHIPPED | OUTPATIENT
Start: 2017-09-27 | End: 2017-10-02

## 2017-09-28 RX ORDER — LANCETS
1 EACH MISCELLANEOUS DAILY
Qty: 50 EACH | Refills: 6 | Status: SHIPPED | OUTPATIENT
Start: 2017-09-28 | End: 2018-10-02 | Stop reason: SDUPTHER

## 2017-09-29 DIAGNOSIS — I10 ESSENTIAL HYPERTENSION: ICD-10-CM

## 2017-09-29 RX ORDER — LISINOPRIL 20 MG/1
TABLET ORAL
Qty: 90 TABLET | Refills: 0 | Status: SHIPPED | OUTPATIENT
Start: 2017-09-29 | End: 2017-12-29 | Stop reason: SDUPTHER

## 2017-10-01 ENCOUNTER — PATIENT MESSAGE (OUTPATIENT)
Dept: INTERNAL MEDICINE | Facility: CLINIC | Age: 67
End: 2017-10-01

## 2017-10-02 ENCOUNTER — TELEPHONE (OUTPATIENT)
Dept: INTERNAL MEDICINE | Facility: CLINIC | Age: 67
End: 2017-10-02

## 2017-10-02 DIAGNOSIS — I10 ESSENTIAL HYPERTENSION: Primary | ICD-10-CM

## 2017-10-02 RX ORDER — TRIAMTERENE/HYDROCHLOROTHIAZID 37.5-25 MG
1 TABLET ORAL DAILY
Qty: 30 TABLET | Refills: 5 | Status: SHIPPED | OUTPATIENT
Start: 2017-10-02 | End: 2018-03-15 | Stop reason: SDUPTHER

## 2017-10-02 NOTE — TELEPHONE ENCOUNTER
----- Message from America Alvarado sent at 10/2/2017  1:12 PM CDT -----  Contact: Howie PATTON [phone] Ref#[id number]  [facility] is calling because the  on medication triamterene- hydrochlorothiazide 37.5-25 mg (DYAZIDE) 37.5-25 mg per capsule, is on back order until December but there is an alternative generic version except it comes in tablets. Pharmacy is requesting if a new prescription can be send to them with the tablets, patient is ok with that. If any questions please call [place] at [phone] ref#[id number]    Thank you

## 2017-10-03 ENCOUNTER — PATIENT MESSAGE (OUTPATIENT)
Dept: INTERNAL MEDICINE | Facility: CLINIC | Age: 67
End: 2017-10-03

## 2017-10-04 ENCOUNTER — TELEPHONE (OUTPATIENT)
Dept: INTERNAL MEDICINE | Facility: CLINIC | Age: 67
End: 2017-10-04

## 2017-10-04 NOTE — TELEPHONE ENCOUNTER
----- Message from Delmy Crowe sent at 10/4/2017 10:37 AM CDT -----  Contact: [phone]  RX request - refill or new RX.  Is this a refill or new RX:  refill  RX name and strength: lancets (ACCU-CHEK FASTCLIX) [facility]  Directions:   Is this a 30 day or 90 day RX:    Pharmacy name and phone #[phone]  Comments need the diagnosis code to process script :

## 2017-10-04 NOTE — TELEPHONE ENCOUNTER
Spoke with [facility] and they were of no help. Spoke with pharmacist at [facility] and found out that the diagnosis code is not work but we ended up using E11.9 to process the lancets and strips.

## 2017-10-05 ENCOUNTER — TELEPHONE (OUTPATIENT)
Dept: UROLOGY | Facility: CLINIC | Age: 67
End: 2017-10-05

## 2017-10-05 NOTE — TELEPHONE ENCOUNTER
----- Message from David Lozano sent at 10/5/2017 10:31 AM CDT -----  Contact: Milton (ALEXUS)  X_ 1st Request  _ 2nd Request  _ 3rd Request    Who: Milton (ALEXUS)    Why: Would like to speak with staff in regards to getting a copy of the current U/S of the abdominal to be able to compare    What Number to Call Back: [phone]    When to Expect a call back: (Before the end of the day)  -- if call after 3:00 call back will be tomorrow.

## 2017-10-06 ENCOUNTER — PATIENT MESSAGE (OUTPATIENT)
Dept: INTERNAL MEDICINE | Facility: CLINIC | Age: 67
End: 2017-10-06

## 2017-10-06 ENCOUNTER — OFFICE VISIT (OUTPATIENT)
Dept: UROLOGY | Facility: CLINIC | Age: 67
End: 2017-10-06
Attending: UROLOGY
Payer: MEDICARE

## 2017-10-06 VITALS
DIASTOLIC BLOOD PRESSURE: 69 MMHG | HEART RATE: 81 BPM | BODY MASS INDEX: 35.14 KG/M2 | HEIGHT: 60 IN | WEIGHT: 179 LBS | SYSTOLIC BLOOD PRESSURE: 145 MMHG

## 2017-10-06 DIAGNOSIS — C64.9 CANCER OF KIDNEY, UNSPECIFIED LATERALITY: Primary | ICD-10-CM

## 2017-10-06 DIAGNOSIS — K76.9 LIVER LESION: ICD-10-CM

## 2017-10-06 PROCEDURE — 99213 OFFICE O/P EST LOW 20 MIN: CPT | Mod: S$GLB,,, | Performed by: UROLOGY

## 2017-10-06 RX ORDER — CHOLECALCIFEROL (VITAMIN D3) 25 MCG
1000 TABLET ORAL DAILY
COMMUNITY

## 2017-10-06 RX ORDER — CALCIUM CARBONATE 600 MG
600 TABLET ORAL ONCE
COMMUNITY

## 2017-10-06 RX ORDER — FAMOTIDINE 20 MG/1
20 TABLET, FILM COATED ORAL DAILY
COMMUNITY
End: 2020-08-12 | Stop reason: SDUPTHER

## 2017-10-06 NOTE — PROGRESS NOTES
Subjective:      Daisy Severino is a 67 y.o. female who returns today regarding her     Here to discuss MRI results.  MRI showed a small cavernous hemangioma of the liver.  There is no evidence of metastatic disease or recurrence in the abdomen or pelvis.    The following portions of the patient's history were reviewed and updated as appropriate: allergies, current medications, past family history, past medical history, past social history, past surgical history and problem list.    Review of Systems  Pertinent items are noted in HPI.  A comprehensive multipoint review of systems was negative except as otherwise stated in the HPI.     Objective:   Vitals: BP (!) 145/69   Pulse 81   Ht 5' (1.524 m)   Wt 81.2 kg (179 lb)   BMI 34.96 kg/m²     Physical Exam   All counseling  Physical Exam    Lab Review   Urinalysis demonstrates unable to give specimen  Lab Results   Component Value Date    WBC 7.49 05/16/2017    HGB 14.4 05/16/2017    HCT 44.7 05/16/2017    MCV 89 05/16/2017     05/16/2017     Lab Results   Component Value Date    CREATININE 1.1 09/18/2017    BUN 28 (H) 09/18/2017       Imaging  MRI performed at [facility] shows cavernous hemangioma of the liver 0.6 cm.  No evidence of local recurrence or metastatic disease    Assessment and Plan:   Cancer of kidney, unspecified laterality  conventional [id number] ROSA MARIA*5years  We will monitor her with yearly history and physical examinations and only order imaging as necessary    Liver lesion; cavernous hemangioma  She will discuss with her primary physician at her yearly appointment in December.  The report is under media tab

## 2017-10-16 ENCOUNTER — CLINICAL SUPPORT (OUTPATIENT)
Dept: INTERNAL MEDICINE | Facility: CLINIC | Age: 67
End: 2017-10-16
Payer: MEDICARE

## 2017-10-16 VITALS
SYSTOLIC BLOOD PRESSURE: 138 MMHG | DIASTOLIC BLOOD PRESSURE: 68 MMHG | WEIGHT: 180.56 LBS | BODY MASS INDEX: 35.26 KG/M2

## 2017-10-16 PROCEDURE — 99211 OFF/OP EST MAY X REQ PHY/QHP: CPT | Mod: PBBFAC

## 2017-10-16 PROCEDURE — 99999 PR PBB SHADOW E&M-EST. PATIENT-LVL I: CPT | Mod: PBBFAC,,,
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
## 2017-11-22 ENCOUNTER — TELEPHONE (OUTPATIENT)
Dept: INTERNAL MEDICINE | Facility: CLINIC | Age: 67
End: 2017-11-22

## 2017-11-22 NOTE — TELEPHONE ENCOUNTER
Returned patients call left message to confirm appt she requested on 12/19/17 at 0900. ..Daisy Pittman RN HC

## 2017-12-10 ENCOUNTER — PATIENT MESSAGE (OUTPATIENT)
Dept: INTERNAL MEDICINE | Facility: CLINIC | Age: 67
End: 2017-12-10

## 2017-12-15 ENCOUNTER — LAB VISIT (OUTPATIENT)
Dept: LAB | Facility: HOSPITAL | Age: 67
End: 2017-12-15
Attending: INTERNAL MEDICINE
Payer: MEDICARE

## 2017-12-15 DIAGNOSIS — R73.09 ELEVATED HEMOGLOBIN A1C: ICD-10-CM

## 2017-12-15 DIAGNOSIS — K76.0 FATTY LIVER: ICD-10-CM

## 2017-12-15 LAB
ALBUMIN SERPL BCP-MCNC: 3.9 G/DL
ALP SERPL-CCNC: 82 U/L
ALT SERPL W/O P-5'-P-CCNC: 15 U/L
ANION GAP SERPL CALC-SCNC: 9 MMOL/L
AST SERPL-CCNC: 18 U/L
BILIRUB SERPL-MCNC: 0.3 MG/DL
BUN SERPL-MCNC: 36 MG/DL
CALCIUM SERPL-MCNC: 10.7 MG/DL
CHLORIDE SERPL-SCNC: 105 MMOL/L
CHOLEST SERPL-MCNC: 207 MG/DL
CHOLEST/HDLC SERPL: 3.4 {RATIO}
CO2 SERPL-SCNC: 26 MMOL/L
CREAT SERPL-MCNC: 1 MG/DL
EST. GFR  (AFRICAN AMERICAN): >60 ML/MIN/1.73 M^2
EST. GFR  (NON AFRICAN AMERICAN): 59.3 ML/MIN/1.73 M^2
ESTIMATED AVG GLUCOSE: 114 MG/DL
GLUCOSE SERPL-MCNC: 101 MG/DL
HBA1C MFR BLD HPLC: 5.6 %
HDLC SERPL-MCNC: 61 MG/DL
HDLC SERPL: 29.5 %
LDLC SERPL CALC-MCNC: 128.2 MG/DL
NONHDLC SERPL-MCNC: 146 MG/DL
POTASSIUM SERPL-SCNC: 4.6 MMOL/L
PROT SERPL-MCNC: 7.7 G/DL
SODIUM SERPL-SCNC: 140 MMOL/L
TRIGL SERPL-MCNC: 89 MG/DL

## 2017-12-15 PROCEDURE — 80061 LIPID PANEL: CPT

## 2017-12-15 PROCEDURE — 80053 COMPREHEN METABOLIC PANEL: CPT

## 2017-12-15 PROCEDURE — 36415 COLL VENOUS BLD VENIPUNCTURE: CPT

## 2017-12-15 PROCEDURE — 83036 HEMOGLOBIN GLYCOSYLATED A1C: CPT

## 2017-12-19 ENCOUNTER — OFFICE VISIT (OUTPATIENT)
Dept: INTERNAL MEDICINE | Facility: CLINIC | Age: 67
End: 2017-12-19
Payer: MEDICARE

## 2017-12-19 ENCOUNTER — CLINICAL SUPPORT (OUTPATIENT)
Dept: INTERNAL MEDICINE | Facility: CLINIC | Age: 67
End: 2017-12-19
Payer: MEDICARE

## 2017-12-19 VITALS
OXYGEN SATURATION: 98 % | BODY MASS INDEX: 35.05 KG/M2 | DIASTOLIC BLOOD PRESSURE: 71 MMHG | HEIGHT: 60 IN | WEIGHT: 178.56 LBS | TEMPERATURE: 98 F | SYSTOLIC BLOOD PRESSURE: 127 MMHG | HEART RATE: 59 BPM

## 2017-12-19 DIAGNOSIS — E55.9 VITAMIN D DEFICIENCY: ICD-10-CM

## 2017-12-19 DIAGNOSIS — E78.5 HYPERLIPIDEMIA, UNSPECIFIED HYPERLIPIDEMIA TYPE: ICD-10-CM

## 2017-12-19 DIAGNOSIS — K21.9 GASTROESOPHAGEAL REFLUX DISEASE, ESOPHAGITIS PRESENCE NOT SPECIFIED: ICD-10-CM

## 2017-12-19 DIAGNOSIS — R73.03 BORDERLINE DIABETES MELLITUS: ICD-10-CM

## 2017-12-19 DIAGNOSIS — Z78.0 POSTMENOPAUSAL: ICD-10-CM

## 2017-12-19 DIAGNOSIS — M85.80 OSTEOPENIA DETERMINED BY X-RAY: ICD-10-CM

## 2017-12-19 DIAGNOSIS — I10 ESSENTIAL HYPERTENSION: Primary | ICD-10-CM

## 2017-12-19 PROCEDURE — 99999 PR PBB SHADOW E&M-EST. PATIENT-LVL III: CPT | Mod: PBBFAC,,, | Performed by: INTERNAL MEDICINE

## 2017-12-19 PROCEDURE — 99214 OFFICE O/P EST MOD 30 MIN: CPT | Mod: S$PBB,,, | Performed by: INTERNAL MEDICINE

## 2017-12-19 PROCEDURE — 99213 OFFICE O/P EST LOW 20 MIN: CPT | Mod: PBBFAC | Performed by: INTERNAL MEDICINE

## 2017-12-19 NOTE — LETTER
December 19, 2017    Daisy CHARLES BENNY [address] 1972  [place] [address]             [facility]  [address]  [place] [address]  Phone: [phone]  Fax: [phone] To whom it may concern:    Ms Daisy Severino follows with me for primary care at the [facility]. I have referred her to a medical fitness program to address hypertension, hyperlipidemia, and borderline diabetes, and she is doing well with this and has seen significant improvements.  I do feel that it is contributing to improvements in her health and that she should continue with the gym membership including regular exercise and instruction.      Please feel free to contact me if any questions.  Shukri,  Hammad Ware MD

## 2017-12-19 NOTE — PROGRESS NOTES
Health  Follow-Up Note   [x] Office  [] Phone  Notes from previous session reviewed.   [x] Previous Session Goals unchanged.   [] Patient/Caregiver Change in Goals.  Goals added or changed by Patient/Caregiver since program participation:  1.  Continue same plan     2. Meet with      Additional/Changed support that patient/caregiver has experienced/sought?  (Indicate readiness, support from family, friends, others, community groups, etc)  1.  [employer]    Additional/Changed obstacles that could prevent patient/caregiver from reaching their goals?  1.  Holidays  2. Stress but better    Feedback provided:  1.  Praised for great job down 2 lbs from last visit total now 12.6 lb  2. Praised for awesome labs A1c down from 6.3 to 5.6 with diet and lifestyle, triglycerides down from 177 to 89, HDl up from 56 to 61    Diagnostic values/Desriptors for follow-up as needed for chronic condition(s)   Weight: 81 kg 178.57 lb    Interventions:   1. Health  listened reflectively, validated thoughts and feelings, offered support and encouragement.   2. Allowed patient to express themselves in a non-biased atmosphere.  3. Health  assisted pt to problem-solve obstacles such as being in a challenging environment and dealing with these challenges.   4. Motivational Interviewed interventions utilized (OARS).   5. Patient responded favorably to interventions and remained actively engaged in the session.   6. Health  will remain available and connected for patient by phone and/or office visits.   7. Positive reinforcement, emotional support and encouragement provided.   8. Focused Education: MI    Plan:  [x] Pt will work on goals as stated above.   [x] Pt will contact Health  for any questions, concerns or needs.  [x] Pt will follow up with Health  in office will call to r/s.    [] Pt will follow up with Health  on phone in:        [x] Health  will remain available.   [] Health   will contact patient by phone in:        [] Health  will consult:        [] Health  will inform Provider via EPIC messaging.     Impression:  1. Behavior is consistent with    Action   Stage of Change.   2. Participation level:       [x] Receptive      [x] Interactive      [] Guarded and Resistant      [x] Self Motivated      [] Refused/Declined to participate   3. [x] Pt voiced understanding of all information presented.       [] Pt voiced needing further information/education. This will be arranged.       [x] Pt would benefit from further education/information as identified by this health . This will be arranged.     Daisy Pittman RN HC

## 2017-12-19 NOTE — PROGRESS NOTES
"Subjective:       Patient ID: Daisy Severino is a 67 y.o. female.    Chief Complaint: Follow-up (6 monthv f/u)    HPI  66 y/o woman with HTN, HLD, borderline DM, h/o renal carcinoma here for follow up. Labs done prior to visit.    Has been working with health  on making good diet changes. Reads nutrition labels, has cut out a lot of carbs. Working with  at the gym 3-4 days/week doing cardio and some weights. Weight down about 10# over past 6 months.     HTN - taking lisinopril 20mg, triamterene-HCTZ 37.5-35mg, following low salt diet. No headache, vision changes, chest pain, or dyspnea.    H/o HLD - , HDL 56,  on previous labs, now improved with , HDL 61, . Taking fish oil / omega 3 supplement,     Borderline DM, has A1c followed regularly; up to 6.2 on 5/16/17, but recent check 12/2017 was 5.6. Not on medication. Has been working with health  and making good changes.   Checking home BG, 90-110s.    GERD - this has improved, no longer needing PPI, now taking famotidine OTC once daily.    Neck & shoulder pain - "close to 100% better." Referred to PMR for this at last visit. Uses voltaren gel if needed. Doing neck/shoulder exercises with small weights at gym.    H/o renal carcinoma - diagnosed with ultrasound and follow-up CT, stage I carcinoma. Had complete nephrectomy with Dr Stepan Ramirez; surgery and post-op course complicated with ICU stay, collapsed lung.  Saw Dr Ramirez 9/2017, had ultrasound repeated which showed possible liver lesion but was otherwise unremarkable; had MRI done which showed small cavernous hemangioma.   BUN a little elevated on recent labs, Cr 1.0.    Ultrasound done last year at  showed possible fatty liver - liver was normal size but increased echogenicity. Hepatic steatosis or increased echogenicity was not noted on more recent liver imaging (ultrasound, MRI).    FOBT x 3 done 9/18/17, negative.    DEXA 5/2017 showed osteopenia; no " indication for treatment with prescription medications. Takes vitamin D daily, takes calcium.  On hormone therapy for menopause symptoms.  Declines flu vaccine today.  H/o zoster in eye in the past.    Review of Systems   Constitutional: Negative for activity change and unexpected weight change.   HENT: Negative for hearing loss, rhinorrhea and trouble swallowing.    Eyes: Negative for discharge and visual disturbance.   Respiratory: Negative for chest tightness and wheezing.    Cardiovascular: Negative for chest pain and palpitations.   Gastrointestinal: Negative for blood in stool, constipation, diarrhea and vomiting.   Endocrine: Negative for polydipsia and polyuria.   Genitourinary: Negative for difficulty urinating, dysuria, hematuria and menstrual problem.   Musculoskeletal: Negative for arthralgias, joint swelling and neck pain.   Neurological: Negative for weakness and headaches.   Psychiatric/Behavioral: Negative for confusion and dysphoric mood.         Past medical history, surgical history, and family medical history reviewed and updated as appropriate.    Medications and allergies reviewed.     Objective:          Vitals:    12/19/17 0835   BP: 127/71   BP Location: Left arm   Patient Position: Sitting   Pulse: (!) 59   Temp: 97.6 °F (36.4 °C)   TempSrc: Oral   SpO2: 98%   Weight: 81 kg (178 lb 9.2 oz)   Height: 5' (1.524 m)     Body mass index is 34.88 kg/m².  Physical Exam   Constitutional: She is oriented to person, place, and time. She appears well-developed and well-nourished. No distress.   Comfortable and conversant   HENT:   Head: Normocephalic and atraumatic.   Nose: Nose normal. No mucosal edema.   Mouth/Throat: Oropharynx is clear and moist. No posterior oropharyngeal erythema.   Eyes: Conjunctivae and EOM are normal. Pupils are equal, round, and reactive to light. No scleral icterus.   Neck: Normal range of motion. Neck supple. No JVD present. No thyromegaly present.   Cardiovascular: Normal  rate, regular rhythm, normal heart sounds and intact distal pulses.    No murmur heard.  Pulmonary/Chest: Effort normal and breath sounds normal. No respiratory distress.   Abdominal: Soft. Bowel sounds are normal. She exhibits no distension. There is no tenderness.   Musculoskeletal: Normal range of motion. She exhibits no edema or tenderness.   Lymphadenopathy:     She has no cervical adenopathy.   Neurological: She is alert and oriented to person, place, and time. She has normal strength and normal reflexes. No cranial nerve deficit or sensory deficit. Gait normal.   Skin: Skin is warm and dry. No rash noted. She is not diaphoretic.   Psychiatric: She has a normal mood and affect.   Vitals reviewed.      Lab Results   Component Value Date    WBC 7.49 05/16/2017    HGB 14.4 05/16/2017    HCT 44.7 05/16/2017     05/16/2017    CHOL 207 (H) 12/15/2017    TRIG 89 12/15/2017    HDL 61 12/15/2017    ALT 15 12/15/2017    AST 18 12/15/2017     12/15/2017    K 4.6 12/15/2017     12/15/2017    CREATININE 1.0 12/15/2017    BUN 36 (H) 12/15/2017    CO2 26 12/15/2017    HGBA1C 5.6 12/15/2017       Assessment:       1. Essential hypertension    2. Hyperlipidemia, unspecified hyperlipidemia type    3. Borderline diabetes mellitus    4. Postmenopausal    5. Osteopenia determined by x-ray    6. Vitamin D deficiency    7. Gastroesophageal reflux disease, esophagitis presence not specified        Plan:   Daisy was seen today for follow-up.    Diagnoses and all orders for this visit:    Essential hypertension  Comments:  at goal, continue current medication, continue working on healthy diet/lifestyle changes  Orders:  -     Comprehensive metabolic panel; Future    Hyperlipidemia, unspecified hyperlipidemia type  Comments:  improved, continue diet/lifestyle changes, will monitor  Orders:  -     Comprehensive metabolic panel; Future  -     Lipid panel; Future    Borderline diabetes mellitus  Comments:  improved! now  A1c in normal range. continue healthy changes  Orders:  -     CBC auto differential; Future  -     Comprehensive metabolic panel; Future  -     Hemoglobin A1c; Future    Postmenopausal  Comments:  on hormone therapy for menopause symptoms; recommended she check in regularly with her Gyn for this  Orders:  -     CBC auto differential; Future    Osteopenia determined by x-ray  Comments:  continue calcium, vitamin D, weight-bearing exercise  Orders:  -     Vitamin D; Future    Vitamin D deficiency  Comments:  continue supplement  Orders:  -     Vitamin D; Future    Gastroesophageal reflux disease, esophagitis presence not specified  Comments:  improved, continue OTC H2 blocker prn    Overall doing very well.    Health maintenance reviewed with patient.   Labs in 6 months  Return in about 6 months (around 6/19/2018) for annual physical.    Hammad Ware MD  Internal Medicine  [facility]  12/19/2017

## 2017-12-29 DIAGNOSIS — I10 ESSENTIAL HYPERTENSION: ICD-10-CM

## 2017-12-29 RX ORDER — LISINOPRIL 20 MG/1
TABLET ORAL
Qty: 90 TABLET | Refills: 1 | Status: SHIPPED | OUTPATIENT
Start: 2017-12-29 | End: 2018-04-16 | Stop reason: SDUPTHER

## 2018-01-02 PROBLEM — K21.9 GASTROESOPHAGEAL REFLUX DISEASE: Status: ACTIVE | Noted: 2018-01-02

## 2018-01-15 ENCOUNTER — LAB VISIT (OUTPATIENT)
Dept: LAB | Facility: HOSPITAL | Age: 68
End: 2018-01-15
Payer: MEDICARE

## 2018-01-15 ENCOUNTER — CLINICAL SUPPORT (OUTPATIENT)
Dept: INTERNAL MEDICINE | Facility: CLINIC | Age: 68
End: 2018-01-15
Payer: MEDICARE

## 2018-01-15 VITALS — WEIGHT: 181.69 LBS | BODY MASS INDEX: 35.48 KG/M2

## 2018-01-15 DIAGNOSIS — M85.80 OSTEOPENIA DETERMINED BY X-RAY: ICD-10-CM

## 2018-01-15 DIAGNOSIS — E55.9 VITAMIN D DEFICIENCY: ICD-10-CM

## 2018-01-15 LAB — 25(OH)D3+25(OH)D2 SERPL-MCNC: 69 NG/ML

## 2018-01-15 PROCEDURE — 36415 COLL VENOUS BLD VENIPUNCTURE: CPT

## 2018-01-15 PROCEDURE — 99211 OFF/OP EST MAY X REQ PHY/QHP: CPT | Mod: PBBFAC

## 2018-01-15 PROCEDURE — 99999 PR PBB SHADOW E&M-EST. PATIENT-LVL I: CPT | Mod: PBBFAC,,,

## 2018-01-15 PROCEDURE — 82306 VITAMIN D 25 HYDROXY: CPT

## 2018-01-15 NOTE — PROGRESS NOTES
Health  Follow-Up Note   [x] Office  [] Phone  Notes from previous session reviewed.   [x] Previous Session Goals unchanged.   [] Patient/Caregiver Change in Goals.  Goals added or changed by Patient/Caregiver since program participation:  1.    Continue same plan   2. Continue going to gym     Feedback provided:  1.  Praised for continued effort and determination    Diagnostic values/Desriptors for follow-up as needed for chronic condition(s)   Weight: 82.4 kg 181.66 lb gain 3 lbs  Blood Glucose: 88-97    Interventions:   1. Health  listened reflectively, validated thoughts and feelings, offered support and encouragement.   2. Allowed patient to express themselves in a non-biased atmosphere.  3. Health  assisted pt to problem-solve obstacles such as being in a challenging environment and dealing with these challenges.   4. Motivational Interviewed interventions utilized (OARS).   5. Patient responded favorably to interventions and remained actively engaged in the session.   6. Health  will remain available and connected for patient by phone and/or office visits.   7. Positive reinforcement, emotional support and encouragement provided.   8. Focused Education: MI, lectins    Plan:  [x] Pt will work on goals as stated above.   [x] Pt will contact Health  for any questions, concerns or needs.  [x] Pt will follow up with Health  in office on    2/19/18 at 1300.    [] Pt will follow up with Health  on phone in:        [x] Health  will remain available.   [] Health  will contact patient by phone in:        [] Health  will consult:        [] Health  will inform Provider via EPIC messaging.     Impression:  1. Behavior is consistent with    Action   Stage of Change.   2. Participation level:       [x] Receptive      [x] Interactive      [] Guarded and Resistant      [x] Self Motivated      [] Refused/Declined to participate   3. [x] Pt voiced understanding of all  information presented.       [] Pt voiced needing further information/education. This will be arranged.       [x] Pt would benefit from further education/information as identified by this health . This will be arranged.     Daisy Pittman RN HC

## 2018-01-26 ENCOUNTER — PATIENT MESSAGE (OUTPATIENT)
Dept: INTERNAL MEDICINE | Facility: CLINIC | Age: 68
End: 2018-01-26

## 2018-01-27 NOTE — TELEPHONE ENCOUNTER
Attempted to call patient, unable to reach. Message sent. Please call patient to check in on symptoms, transfer to [facility] if needed.

## 2018-02-04 ENCOUNTER — PATIENT MESSAGE (OUTPATIENT)
Dept: INTERNAL MEDICINE | Facility: CLINIC | Age: 68
End: 2018-02-04

## 2018-02-19 ENCOUNTER — CLINICAL SUPPORT (OUTPATIENT)
Dept: INTERNAL MEDICINE | Facility: CLINIC | Age: 68
End: 2018-02-19
Payer: MEDICARE

## 2018-02-19 VITALS — BODY MASS INDEX: 35.56 KG/M2 | WEIGHT: 182.13 LBS

## 2018-02-19 NOTE — PROGRESS NOTES
Health  Follow-Up Note   [x] Office  [] Phone  Notes from previous session reviewed.   [x] Previous Session Goals unchanged.   [] Patient/Caregiver Change in Goals.  Goals added or changed by Patient/Caregiver since program participation:  1.  Get back on track        Additional/Changed support that patient/caregiver has experienced/sought?  (Indicate readiness, support from family, friends, others, community groups, etc)  1.   Son    Additional/Changed obstacles that could prevent patient/caregiver from reaching their goals?  1.  Flu x 12 days    Feedback provided:   1.  Praised for continued effort and determination    Diagnostic values/Desriptors for follow-up as needed for chronic condition(s)   Weight: 82.6 kg 182.1 lb gain 0.5 lb    Interventions:   1. Health  listened reflectively, validated thoughts and feelings, offered support and encouragement.   2. Allowed patient to express themselves in a non-biased atmosphere.  3. Health  assisted pt to problem-solve obstacles such as being in a challenging environment and dealing with these challenges.   4. Motivational Interviewed interventions utilized (OARS).   5. Patient responded favorably to interventions and remained actively engaged in the session.   6. Health  will remain available and connected for patient by phone and/or office visits.   7. Positive reinforcement, emotional support and encouragement provided.   8. Focused Education: MI    Plan:  [x] Pt will work on goals as stated above.   [x] Pt will contact Health  for any questions, concerns or needs.  [x] Pt will follow up with Health  in office on  3/19/18 at 1300.      [] Pt will follow up with Health  on phone in:        [x] Health  will remain available.   [] Health  will contact patient by phone in:        [] Health  will consult:        [] Health  will inform Provider via EPIC messaging.     Impression:  1. Behavior is consistent with    Action    Stage of Change.   2. Participation level:       [x] Receptive      [x] Interactive      [] Guarded and Resistant      [x] Self Motivated      [] Refused/Declined to participate   3. [x] Pt voiced understanding of all information presented.       [] Pt voiced needing further information/education. This will be arranged.       [x] Pt would benefit from further education/information as identified by this health . This will be arranged.     Daisy Pittman RN HC

## 2018-03-15 ENCOUNTER — PATIENT MESSAGE (OUTPATIENT)
Dept: INTERNAL MEDICINE | Facility: CLINIC | Age: 68
End: 2018-03-15

## 2018-03-15 DIAGNOSIS — I10 ESSENTIAL HYPERTENSION: ICD-10-CM

## 2018-03-16 RX ORDER — TRIAMTERENE/HYDROCHLOROTHIAZID 37.5-25 MG
1 TABLET ORAL DAILY
Qty: 30 TABLET | Refills: 3 | Status: SHIPPED | OUTPATIENT
Start: 2018-03-16 | End: 2018-07-21 | Stop reason: SDUPTHER

## 2018-04-03 ENCOUNTER — OFFICE VISIT (OUTPATIENT)
Dept: OBSTETRICS AND GYNECOLOGY | Facility: CLINIC | Age: 68
End: 2018-04-03
Payer: MEDICARE

## 2018-04-03 ENCOUNTER — PATIENT MESSAGE (OUTPATIENT)
Dept: INTERNAL MEDICINE | Facility: CLINIC | Age: 68
End: 2018-04-03

## 2018-04-03 ENCOUNTER — TELEPHONE (OUTPATIENT)
Dept: INTERNAL MEDICINE | Facility: CLINIC | Age: 68
End: 2018-04-03

## 2018-04-03 VITALS
HEIGHT: 60 IN | BODY MASS INDEX: 35.28 KG/M2 | WEIGHT: 179.69 LBS | DIASTOLIC BLOOD PRESSURE: 68 MMHG | SYSTOLIC BLOOD PRESSURE: 124 MMHG

## 2018-04-03 DIAGNOSIS — Z79.890 POSTMENOPAUSAL HRT (HORMONE REPLACEMENT THERAPY): ICD-10-CM

## 2018-04-03 DIAGNOSIS — Z01.419 WELL WOMAN EXAM WITH ROUTINE GYNECOLOGICAL EXAM: ICD-10-CM

## 2018-04-03 DIAGNOSIS — Z12.4 ROUTINE PAPANICOLAOU SMEAR: Primary | ICD-10-CM

## 2018-04-03 PROCEDURE — 88175 CYTOPATH C/V AUTO FLUID REDO: CPT

## 2018-04-03 PROCEDURE — 99214 OFFICE O/P EST MOD 30 MIN: CPT | Mod: PBBFAC,PO | Performed by: OBSTETRICS & GYNECOLOGY

## 2018-04-03 PROCEDURE — G0101 CA SCREEN;PELVIC/BREAST EXAM: HCPCS | Mod: S$PBB,,, | Performed by: OBSTETRICS & GYNECOLOGY

## 2018-04-03 PROCEDURE — 99999 PR PBB SHADOW E&M-EST. PATIENT-LVL IV: CPT | Mod: PBBFAC,,, | Performed by: OBSTETRICS & GYNECOLOGY

## 2018-04-03 RX ORDER — ESTRADIOL 0.5 MG/1
0.5 TABLET ORAL DAILY
Qty: 90 TABLET | Refills: 3 | Status: SHIPPED | OUTPATIENT
Start: 2018-04-03 | End: 2018-10-12 | Stop reason: SDUPTHER

## 2018-04-03 NOTE — TELEPHONE ENCOUNTER
----- Message from Darwin Turner sent at 4/3/2018  3:04 PM CDT -----  Contact: Patient  06/21/18 Patient Annual Physical need Lab Orders placed and linked    Thank you

## 2018-04-03 NOTE — PROGRESS NOTES
HPI:  68 y.o.   OB History      Para Term  AB Living    2 2            SAB TAB Ectopic Multiple Live Births                    No LMP recorded. Patient has had a hysterectomy.   Patient here for her annual gynecologic exam.  She has no complaints at this time.    ROS:  GENERAL: No fever, chills, fatigability or weight loss.  SKIN: No rashes, itching or changes in color or texture of skin.  HEAD: No headaches or recent head trauma.  EYES: Visual acuity fine. No photophobia, ocular pain or diplopia.  EARS: Denies ear pain, discharge or vertigo.  NOSE: No loss of smell, no epistaxis or postnasal drip.  MOUTH & THROAT: No hoarseness or change in voice. No excessive gum bleeding.  NODES: Denies swollen glands.  CHEST: Denies [place], cyanosis, wheezing, cough and sputum production.  CARDIOVASCULAR: Denies chest pain, PND, orthopnea or reduced exercise tolerance.  ABDOMEN: Appetite fine. No weight loss. Denies diarrhea, abdominal pain, hematemesis or blood in stool.  URINARY: No flank pain, dysuria or hematuria.  PERIPHERAL VASCULAR: No claudication or cyanosis.  MUSCULOSKELETAL: No joint stiffness or swelling. Denies back pain.  NEUROLOGIC: No history of seizures, paralysis, alteration of gait or coordination.    PE:   /68   Ht 5' (1.524 m)   Wt 81.5 kg (179 lb 10.8 oz)   BMI 35.09 kg/m²   APPEARANCE: Well nourished, well developed, in no acute distress.  NECK: Neck symmetric without masses or thyromegaly.  NODES: No inguinal lymph node enlargement.  ABDOMEN: Soft. No tenderness or masses. No hepatosplenomegaly. No hernias.  BREASTS: Symmetrical, no skin changes or visible lesions. No palpable masses, nipple discharge or adenopathy bilaterally.  PELVIC: Normal external female genitalia without lesions. Normal hair distribution. Adequate perineal body, normal urethral meatus. Vagina moist and well rugated without lesions or discharge. No significant cystocele or rectocele. Uterus and cervix surgically  absent. Bimanual exam revealed no masses, tenderness or abnormality.    Procedure:  Pap Smear    Assessment:  Normal Gynecologic Exam    Plan:  Mammogram and Colonoscopy as per current recommendations.   Return to clinic in one year or for any problems or complaints.  Renal cancer  On hrt  Lost wt  Doing well

## 2018-04-16 ENCOUNTER — PATIENT MESSAGE (OUTPATIENT)
Dept: INTERNAL MEDICINE | Facility: CLINIC | Age: 68
End: 2018-04-16

## 2018-04-16 DIAGNOSIS — I10 ESSENTIAL HYPERTENSION: ICD-10-CM

## 2018-04-16 RX ORDER — LISINOPRIL 20 MG/1
20 TABLET ORAL DAILY
Qty: 90 TABLET | Refills: 0 | Status: SHIPPED | OUTPATIENT
Start: 2018-04-16 | End: 2018-04-18 | Stop reason: SDUPTHER

## 2018-04-18 ENCOUNTER — TELEPHONE (OUTPATIENT)
Dept: INTERNAL MEDICINE | Facility: CLINIC | Age: 68
End: 2018-04-18

## 2018-04-18 DIAGNOSIS — I10 ESSENTIAL HYPERTENSION: ICD-10-CM

## 2018-04-18 RX ORDER — LISINOPRIL 20 MG/1
20 TABLET ORAL DAILY
Qty: 90 TABLET | Refills: 0 | Status: SHIPPED | OUTPATIENT
Start: 2018-04-18 | End: 2018-07-21 | Stop reason: SDUPTHER

## 2018-04-18 NOTE — TELEPHONE ENCOUNTER
----- Message from Amina Morales sent at 4/18/2018  8:41 AM CDT -----  Contact: self/[phone]  Patient called in regards needing to talk with Dr Ware medical staff ([place]) about medication that patient was given the last time that she was seen. Patient did not want to specify the name of medication. Please call and advise. Thank you!!!

## 2018-04-19 NOTE — TELEPHONE ENCOUNTER
From patient's note, she mentions both the [facility] and [facility]. Will send in 90-day supply but please call patient to clarify whether she means the [facility] or the [facility] home delivery pharmacy.

## 2018-04-20 ENCOUNTER — PATIENT MESSAGE (OUTPATIENT)
Dept: INTERNAL MEDICINE | Facility: CLINIC | Age: 68
End: 2018-04-20

## 2018-05-18 ENCOUNTER — PATIENT MESSAGE (OUTPATIENT)
Dept: INTERNAL MEDICINE | Facility: CLINIC | Age: 68
End: 2018-05-18

## 2018-07-16 ENCOUNTER — TELEPHONE (OUTPATIENT)
Dept: OBSTETRICS AND GYNECOLOGY | Facility: CLINIC | Age: 68
End: 2018-07-16

## 2018-07-16 NOTE — TELEPHONE ENCOUNTER
----- Message from Denise Ackerman sent at 7/16/2018 12:44 PM CDT -----  Contact: Reggie Reddy, [phone]  Called in requesting to complete prior authorization for Estradiol prescription. Case # [id number].

## 2018-07-19 ENCOUNTER — LAB VISIT (OUTPATIENT)
Dept: LAB | Facility: HOSPITAL | Age: 68
End: 2018-07-19
Attending: INTERNAL MEDICINE
Payer: MEDICARE

## 2018-07-19 DIAGNOSIS — E78.5 HYPERLIPIDEMIA, UNSPECIFIED HYPERLIPIDEMIA TYPE: ICD-10-CM

## 2018-07-19 DIAGNOSIS — R73.03 BORDERLINE DIABETES MELLITUS: ICD-10-CM

## 2018-07-19 DIAGNOSIS — Z78.0 POSTMENOPAUSAL: ICD-10-CM

## 2018-07-19 DIAGNOSIS — I10 ESSENTIAL HYPERTENSION: ICD-10-CM

## 2018-07-19 LAB
ALBUMIN SERPL BCP-MCNC: 3.7 G/DL
ALP SERPL-CCNC: 76 U/L
ALT SERPL W/O P-5'-P-CCNC: 18 U/L
ANION GAP SERPL CALC-SCNC: 9 MMOL/L
AST SERPL-CCNC: 18 U/L
BASOPHILS # BLD AUTO: 0.04 K/UL
BASOPHILS NFR BLD: 0.5 %
BILIRUB SERPL-MCNC: 0.5 MG/DL
BUN SERPL-MCNC: 30 MG/DL
CALCIUM SERPL-MCNC: 9.3 MG/DL
CHLORIDE SERPL-SCNC: 107 MMOL/L
CHOLEST SERPL-MCNC: 192 MG/DL
CHOLEST/HDLC SERPL: 3.8 {RATIO}
CO2 SERPL-SCNC: 24 MMOL/L
CREAT SERPL-MCNC: 1 MG/DL
DIFFERENTIAL METHOD: ABNORMAL
EOSINOPHIL # BLD AUTO: 0.2 K/UL
EOSINOPHIL NFR BLD: 2.1 %
ERYTHROCYTE [DISTWIDTH] IN BLOOD BY AUTOMATED COUNT: 13.7 %
EST. GFR  (AFRICAN AMERICAN): >60 ML/MIN/1.73 M^2
EST. GFR  (NON AFRICAN AMERICAN): 59 ML/MIN/1.73 M^2
ESTIMATED AVG GLUCOSE: 123 MG/DL
GLUCOSE SERPL-MCNC: 110 MG/DL
HBA1C MFR BLD HPLC: 5.9 %
HCT VFR BLD AUTO: 42.9 %
HDLC SERPL-MCNC: 51 MG/DL
HDLC SERPL: 26.6 %
HGB BLD-MCNC: 13.6 G/DL
LDLC SERPL CALC-MCNC: 111.4 MG/DL
LYMPHOCYTES # BLD AUTO: 1.9 K/UL
LYMPHOCYTES NFR BLD: 25.4 %
MCH RBC QN AUTO: 28.2 PG
MCHC RBC AUTO-ENTMCNC: 31.7 G/DL
MCV RBC AUTO: 89 FL
MONOCYTES # BLD AUTO: 0.7 K/UL
MONOCYTES NFR BLD: 9.9 %
NEUTROPHILS # BLD AUTO: 4.7 K/UL
NEUTROPHILS NFR BLD: 62 %
NONHDLC SERPL-MCNC: 141 MG/DL
PLATELET # BLD AUTO: 256 K/UL
PMV BLD AUTO: 9.5 FL
POTASSIUM SERPL-SCNC: 4.7 MMOL/L
PROT SERPL-MCNC: 7.1 G/DL
RBC # BLD AUTO: 4.83 M/UL
SODIUM SERPL-SCNC: 140 MMOL/L
TRIGL SERPL-MCNC: 148 MG/DL
WBC # BLD AUTO: 7.51 K/UL

## 2018-07-19 PROCEDURE — 85025 COMPLETE CBC W/AUTO DIFF WBC: CPT

## 2018-07-19 PROCEDURE — 36415 COLL VENOUS BLD VENIPUNCTURE: CPT

## 2018-07-19 PROCEDURE — 80061 LIPID PANEL: CPT

## 2018-07-19 PROCEDURE — 83036 HEMOGLOBIN GLYCOSYLATED A1C: CPT

## 2018-07-19 PROCEDURE — 80053 COMPREHEN METABOLIC PANEL: CPT

## 2018-07-21 ENCOUNTER — OFFICE VISIT (OUTPATIENT)
Dept: INTERNAL MEDICINE | Facility: CLINIC | Age: 68
End: 2018-07-21
Payer: MEDICARE

## 2018-07-21 VITALS
TEMPERATURE: 98 F | BODY MASS INDEX: 35.49 KG/M2 | SYSTOLIC BLOOD PRESSURE: 138 MMHG | DIASTOLIC BLOOD PRESSURE: 70 MMHG | HEIGHT: 60 IN | OXYGEN SATURATION: 97 % | WEIGHT: 180.75 LBS | HEART RATE: 62 BPM

## 2018-07-21 DIAGNOSIS — Z85.528 HISTORY OF RENAL CARCINOMA: ICD-10-CM

## 2018-07-21 DIAGNOSIS — Z12.39 SCREENING FOR MALIGNANT NEOPLASM OF BREAST: ICD-10-CM

## 2018-07-21 DIAGNOSIS — M85.80 OSTEOPENIA DETERMINED BY X-RAY: ICD-10-CM

## 2018-07-21 DIAGNOSIS — R42 DIZZINESS: ICD-10-CM

## 2018-07-21 DIAGNOSIS — E78.5 HYPERLIPIDEMIA, UNSPECIFIED HYPERLIPIDEMIA TYPE: ICD-10-CM

## 2018-07-21 DIAGNOSIS — M47.22 OSTEOARTHRITIS OF SPINE WITH RADICULOPATHY, CERVICAL REGION: ICD-10-CM

## 2018-07-21 DIAGNOSIS — K76.0 FATTY LIVER: ICD-10-CM

## 2018-07-21 DIAGNOSIS — Z86.19 HISTORY OF HERPES ZOSTER: ICD-10-CM

## 2018-07-21 DIAGNOSIS — R73.03 BORDERLINE DIABETES MELLITUS: ICD-10-CM

## 2018-07-21 DIAGNOSIS — Z12.11 SCREENING FOR MALIGNANT NEOPLASM OF COLON: ICD-10-CM

## 2018-07-21 DIAGNOSIS — M54.42 LEFT-SIDED LOW BACK PAIN WITH LEFT-SIDED SCIATICA, UNSPECIFIED CHRONICITY: ICD-10-CM

## 2018-07-21 DIAGNOSIS — I10 ESSENTIAL HYPERTENSION: ICD-10-CM

## 2018-07-21 DIAGNOSIS — Z00.00 ANNUAL PHYSICAL EXAM: Primary | ICD-10-CM

## 2018-07-21 PROCEDURE — 99213 OFFICE O/P EST LOW 20 MIN: CPT | Mod: S$PBB,,, | Performed by: INTERNAL MEDICINE

## 2018-07-21 PROCEDURE — 99999 PR PBB SHADOW E&M-EST. PATIENT-LVL V: CPT | Mod: PBBFAC,,, | Performed by: INTERNAL MEDICINE

## 2018-07-21 PROCEDURE — G0009 ADMIN PNEUMOCOCCAL VACCINE: HCPCS | Mod: PBBFAC

## 2018-07-21 PROCEDURE — 99215 OFFICE O/P EST HI 40 MIN: CPT | Mod: PBBFAC,25 | Performed by: INTERNAL MEDICINE

## 2018-07-21 RX ORDER — LISINOPRIL 20 MG/1
20 TABLET ORAL DAILY
Qty: 90 TABLET | Refills: 1 | Status: SHIPPED | OUTPATIENT
Start: 2018-07-21 | End: 2019-01-16 | Stop reason: SDUPTHER

## 2018-07-21 RX ORDER — TRIAMTERENE/HYDROCHLOROTHIAZID 37.5-25 MG
1 TABLET ORAL DAILY
Qty: 90 TABLET | Refills: 1 | Status: SHIPPED | OUTPATIENT
Start: 2018-07-21 | End: 2019-01-18 | Stop reason: SDUPTHER

## 2018-07-21 NOTE — PROGRESS NOTES
"Subjective:       Patient ID: Daisy Severino is a 68 y.o. female.    Chief Complaint: Annual Exam    HPI  67 y/o woman with HTN, HLD, borderline DM, h/o renal carcinoma here for annual exam.  Labs done before visit.    More stress in recent months with her jobs; exercising less and being less careful with her diet for a few months. 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  Now getting back to the gym, works with a  there.    Had flare of sciatica around end of June for ~3 weeks, severe enough that she had difficulty walking and was using a walker.     Requests referral to ENT, has seen ENT at  previously for "equilibrium problem" / vertigo. Has significant chronic allergies as well. Has taken valium 2mg + meclizine for her vertigo.   Has used steroid nasal spray but not consistently. Takes loratadine regularly.    H/o zoster in L eye previously, has also had this in her ear years ago. Has rash she wants to have checked today.    Follows with eye doctor at [facility], has dry eyes, uses restasis.    HTN - taking lisinopril 20mg, triamterene-HCTZ 37.5-35mg. Running higher in 130s/70s.     H/o HLD -Taking fish oil / omega 3 supplement.  HDL 51,  on recent labs.    Borderline DM, has A1c followed regularly; up to 6.2 on 5/16/17, down to 5.6 12/2017, now up to 5.9 on recent labs 7/2018. Checks BG at home every morning - reports average around 103.     GERD - this has improved, no longer needing PPI, now taking famotidine OTC once daily.    Neck & shoulder pain - doing well with this. Uses voltaren gel if needed, only rarely. Was doing neck/shoulder exercises with small weights at gym.    H/o renal carcinoma - diagnosed with ultrasound and follow-up CT, stage I carcinoma. Had complete nephrectomy with Dr Stepan Ramirez; surgery and post-op course complicated with ICU stay, collapsed lung.  Saw Dr" Ramirez 9/2017, had ultrasound repeated which showed possible liver lesion but was otherwise unremarkable; had MRI done which showed small cavernous hemangioma.   BUN 30, Cr 1.0 on recent labs.  No anemia.    Hepatic steatosis - LFTs normal.    FOBT x 3 done 9/18/17, negative.  DEXA 5/2017 showed osteopenia; no indication for treatment with prescription medications. Takes vitamin D daily, takes calcium.  On hormone therapy for menopause symptoms.  H/o zoster in eye in the past.    Review of Systems   Constitutional: Negative for activity change and unexpected weight change.   HENT: Negative for hearing loss, rhinorrhea and trouble swallowing.    Eyes: Negative for discharge and visual disturbance.   Respiratory: Negative for cough, chest tightness, shortness of breath and wheezing.    Cardiovascular: Negative for chest pain, palpitations and leg swelling.   Gastrointestinal: Negative for blood in stool, constipation, diarrhea and vomiting.   Endocrine: Negative for polydipsia and polyuria.   Genitourinary: Negative for difficulty urinating, dysuria, hematuria and menstrual problem.   Musculoskeletal: Positive for back pain. Negative for arthralgias, joint swelling and neck pain.   Skin: Negative for rash.   Allergic/Immunologic: Positive for environmental allergies.   Neurological: Positive for dizziness (not currently). Negative for weakness, numbness and headaches.   Psychiatric/Behavioral: Negative for confusion and dysphoric mood. The patient is not nervous/anxious.          Past medical history, surgical history, and family medical history reviewed and updated as appropriate.    Medications and allergies reviewed.     Objective:          Vitals:    07/21/18 0904   BP: 138/70   BP Location: Left arm   Patient Position: Sitting   Pulse: 62   Temp: 98 °F (36.7 °C)   TempSrc: Oral   SpO2: 97%   Weight: 82 kg (180 lb 12.4 oz)   Height: 5' (1.524 m)     Body mass index is 35.31 kg/m².  Physical Exam   Constitutional:  She is oriented to person, place, and time. She appears well-developed and well-nourished. No distress.   Comfortable and conversant   HENT:   Head: Normocephalic and atraumatic.   Nose: Nose normal. No mucosal edema.   Mouth/Throat: Oropharynx is clear and moist. No posterior oropharyngeal erythema.   Eyes: Conjunctivae and EOM are normal. Pupils are equal, round, and reactive to light. No scleral icterus.   Neck: Normal range of motion. Neck supple. No JVD present. No thyromegaly present.   Cardiovascular: Normal rate, regular rhythm, normal heart sounds and intact distal pulses.    No murmur heard.  Pulmonary/Chest: Effort normal and breath sounds normal. No respiratory distress.   Abdominal: Soft. Bowel sounds are normal. She exhibits no distension. There is no tenderness.   Musculoskeletal: Normal range of motion. She exhibits tenderness. She exhibits no edema.   Good ROM and flexibility in hips and low back; some pain with low back flexion as well as with palpation over L SI joint   Lymphadenopathy:     She has no cervical adenopathy.   Neurological: She is alert and oriented to person, place, and time. She has normal strength and normal reflexes. No cranial nerve deficit or sensory deficit. Gait normal.   SLR negative bilaterally   Skin: Skin is warm and dry. No rash noted. She is not diaphoretic.   Psychiatric: She has a normal mood and affect.   Vitals reviewed.      Lab Results   Component Value Date    WBC 7.51 07/19/2018    HGB 13.6 07/19/2018    HCT 42.9 07/19/2018     07/19/2018    CHOL 192 07/19/2018    TRIG 148 07/19/2018    HDL 51 07/19/2018    ALT 18 07/19/2018    AST 18 07/19/2018     07/19/2018    K 4.7 07/19/2018     07/19/2018    CREATININE 1.0 07/19/2018    BUN 30 (H) 07/19/2018    CO2 24 07/19/2018    HGBA1C 5.9 (H) 07/19/2018       Assessment:       1. Annual physical exam    2. Dizziness    3. Essential hypertension    4. Hyperlipidemia, unspecified hyperlipidemia type     5. Osteoarthritis of spine with radiculopathy, cervical region    6. Left-sided low back pain with left-sided sciatica, unspecified chronicity    7. Borderline diabetes mellitus    8. History of renal carcinoma    9. History of herpes zoster    10. Fatty liver    11. Osteopenia determined by x-ray    12. Screening for malignant neoplasm of breast    13. Screening for malignant neoplasm of colon        Plan:   Daisy was seen today for annual exam.    Diagnoses and all orders for this visit:    Annual physical exam - Overall healthy, doing well other than stress and recent sciatica. Reviewed chronic and preventive health concerns.   -     Pneumococcal Polysaccharide Vaccine (23 Valent) (SQ/IM)    Dizziness  Comments:  requests referral to ENT, would like to transfer care from [facility] provider  Orders:  -     Ambulatory Referral to ENT    Essential hypertension  Comments:  continue current meds, monitor at home, work on more consistent healthy low-sodium diet, regular exercise  Orders:  -     Comprehensive metabolic panel; Future  -     triamterene-hydrochlorothiazide 37.5-25 mg (MAXZIDE-25) 37.5-25 mg per tablet; Take 1 tablet by mouth once daily.  -     lisinopril (PRINIVIL,ZESTRIL) 20 MG tablet; Take 1 tablet (20 mg total) by mouth once daily.    Hyperlipidemia, unspecified hyperlipidemia type  Comments:  continue omega-3/FA supplement    Osteoarthritis of spine with radiculopathy, cervical region  Left-sided low back pain with left-sided sciatica, unspecified chronicity  Comments:  referring back to PT, continue exercise at gym as well; if flaring again return for re-evaluation, may benefit from seeing PMR again  Orders:  -     Ambulatory Referral to Physical/Occupational Therapy    Borderline diabetes mellitus  Comments:  A1c increased with less healthy diet, less exercise - working on changing these. will monitor.  Orders:  -     Comprehensive metabolic panel; Future  -     Hemoglobin A1c; Future    History of  renal carcinoma  Comments:  schedule follow up with Dr Ramirez for September    History of herpes zoster  Comments:  no acute flare today; does want to check in with ENT about this as well  Orders:  -     Ambulatory Referral to ENT    Fatty liver  Comments:  continue working on healthy diet, weight loss. will continue to monitor LFTs. avoid alcohol, tylenol.  Orders:  -     Comprehensive metabolic panel; Future    Osteopenia determined by x-ray  Comments:  repeat DEXA 5/2019    Screening for malignant neoplasm of breast  -     Mammo Digital Screening Bilat with CAD; Future    Screening for malignant neoplasm of colon - recommended she contact clinic to get FIT ordered if deciding not to do colonoscopy  -     Case request GI: COLONOSCOPY    Lab results reviewed & discussed with patient.  Health maintenance reviewed with patient.   Pneumovax today  Scheduling for mammogram  Labs again in 6 months  Follow-up in about 6 months (around 1/21/2019) for hypertension, borderline DM.    Hammad Ware MD  Internal Medicine  [facility]  7/21/2018

## 2018-07-23 ENCOUNTER — TELEPHONE (OUTPATIENT)
Dept: OBSTETRICS AND GYNECOLOGY | Facility: CLINIC | Age: 68
End: 2018-07-23

## 2018-07-23 NOTE — TELEPHONE ENCOUNTER
----- Message from Kavita Cai sent at 7/23/2018 12:32 PM CDT -----  Contact: Self. [phone]  Patient would like to speak with you about needing a prior authorization for estradiol (ESTRACE) 0.5 MG tablet. Please advise

## 2018-07-23 NOTE — TELEPHONE ENCOUNTER
Pt is concerned because the pharmacist told her that being on estrace is risky due to her age. Pt states that she has now read up on it and is concerned as well. Also will all hormones be high risk for her. Please advise

## 2018-07-25 ENCOUNTER — TELEPHONE (OUTPATIENT)
Dept: OTOLARYNGOLOGY | Facility: CLINIC | Age: 68
End: 2018-07-25

## 2018-07-25 ENCOUNTER — OFFICE VISIT (OUTPATIENT)
Dept: OTOLARYNGOLOGY | Facility: CLINIC | Age: 68
End: 2018-07-25
Payer: MEDICARE

## 2018-07-25 ENCOUNTER — CLINICAL SUPPORT (OUTPATIENT)
Dept: AUDIOLOGY | Facility: CLINIC | Age: 68
End: 2018-07-25
Payer: MEDICARE

## 2018-07-25 VITALS
WEIGHT: 178.13 LBS | SYSTOLIC BLOOD PRESSURE: 130 MMHG | HEART RATE: 73 BPM | BODY MASS INDEX: 34.97 KG/M2 | DIASTOLIC BLOOD PRESSURE: 86 MMHG | TEMPERATURE: 98 F | HEIGHT: 60 IN

## 2018-07-25 DIAGNOSIS — H04.202 LEFT EPIPHORA: ICD-10-CM

## 2018-07-25 DIAGNOSIS — H90.3 HEARING LOSS, SENSORINEURAL, HIGH FREQUENCY, BILATERAL: ICD-10-CM

## 2018-07-25 DIAGNOSIS — Z87.898 HISTORY OF DIZZINESS: ICD-10-CM

## 2018-07-25 DIAGNOSIS — Z86.19 HISTORY OF SHINGLES: ICD-10-CM

## 2018-07-25 DIAGNOSIS — H90.3 BILATERAL HIGH FREQUENCY SENSORINEURAL HEARING LOSS: ICD-10-CM

## 2018-07-25 DIAGNOSIS — R09.82 POST-NASAL DRIP: ICD-10-CM

## 2018-07-25 DIAGNOSIS — H57.13 PERIORBITAL PAIN, BILATERAL: ICD-10-CM

## 2018-07-25 DIAGNOSIS — Z85.528 HISTORY OF RENAL CELL CARCINOMA: Primary | ICD-10-CM

## 2018-07-25 DIAGNOSIS — Z88.9 HISTORY OF MULTIPLE ALLERGIES: ICD-10-CM

## 2018-07-25 DIAGNOSIS — H81.8X9 OTHER DISORDERS OF VESTIBULAR FUNCTION, UNSPECIFIED EAR: Primary | ICD-10-CM

## 2018-07-25 PROCEDURE — 92567 TYMPANOMETRY: CPT | Mod: PBBFAC | Performed by: AUDIOLOGIST

## 2018-07-25 PROCEDURE — 99999 PR PBB SHADOW E&M-EST. PATIENT-LVL IV: CPT | Mod: PBBFAC,,, | Performed by: OTOLARYNGOLOGY

## 2018-07-25 PROCEDURE — 99203 OFFICE O/P NEW LOW 30 MIN: CPT | Mod: S$PBB,,, | Performed by: OTOLARYNGOLOGY

## 2018-07-25 PROCEDURE — 92557 COMPREHENSIVE HEARING TEST: CPT | Mod: PBBFAC | Performed by: AUDIOLOGIST

## 2018-07-25 PROCEDURE — 99214 OFFICE O/P EST MOD 30 MIN: CPT | Mod: PBBFAC | Performed by: OTOLARYNGOLOGY

## 2018-07-25 NOTE — LETTER
July 26, 2018      Hammad Ware MD  [address]  [facility] [address]           [facility] Otorhinol[facility]ology  [address]  [facility] [address]  Phone: [phone]  Fax: [phone]          Patient: Daisy Severino   MR Number: [id number]   YOB: 1950   Date of Visit: 7/25/2018       Dear Dr. Hammad Ware:    Thank you for referring Daisy Severino to me for evaluation. Attached you will find relevant portions of my assessment and plan of care.    If you have questions, please do not hesitate to call me. I look forward to following Daisy Severino along with you.    Sincerely,    Michael Lewis III, MD    Enclosure  CC:  No Recipients    If you would like to receive this communication electronically, please contact [email or web address] or [phone] to request more information on [facility] access.    For providers and/or their staff who would like to refer a patient to [facility], please contact us through our one-stop-shop provider referral line, [facility], at [phone].    If you feel you have received this communication in error or would no longer like to receive these types of communications, please e-mail [email or web address]

## 2018-07-25 NOTE — PROGRESS NOTES
Ms. Daisy Severino was seen in the clinic today for an audiological evaluation.   Ms. Severino reported that she intermittently feels off-balance.      Audiological testing revealed a mild high frequency hearing loss of the right ear and a mild to moderate high frequency sensorineural hearing loss of the left ear.  A speech reception threshold was obtained at 15 dBHL for the right ear and at 20 dBHL for the left ear.  Speech discrimination was 100% for the right ear and 96% for the left ear.      Tympanometry testing revealed Type A tympanograms, bilaterally.      Recommendations:  1. Otologic evaluation  2. Annual hearing evaluation  3. Hearing protection when in noise   4. Hearing aid consultation, if Ms. Severino feels as though her hearing loss is negatively impacting her quality of life

## 2018-07-25 NOTE — PATIENT INSTRUCTIONS
Audiometry reviewed; copy of study provided   Sinus CT ordered  May use Flonase NSS 1-2 sprays @ nostril once a day + Claritin  Rx for diazepam 2 mg # 25; take 1-2 po q 8 hours prn vertigo  Consider VNG testing pending course  YASSSUtronic sinus CT ordered  Allergy consultation encouraged [phone]  Check with Dr. Jackson re: OS tearing

## 2018-07-25 NOTE — TELEPHONE ENCOUNTER
----- Message from Ronen Salvador sent at 7/25/2018  4:31 PM CDT -----  Contact: [facility]  Needs Advice    Reason for call:   Please contact [facility] regarding an RX verification, pt currently in pharmacy     Communication Preference: [phone]  Additional Information:

## 2018-07-25 NOTE — PROGRESS NOTES
"Subjective:       Patient ID: Daisy Severino is a 68 y.o. female.    Chief Complaint: No chief complaint on file.    HPI: Ms. Severino is a 68-year-old  female with a BMI of 34 kg/m² whose handwritten reason for the visit today is "ALLERGIES, dizziness, headaches".  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 Moe Cordova, Merlin and Carine.  She has a significant medical history which includes left nephrectomy for renal carcinoma ans a hx of shingles ( right head) .  She indicates a significant  tearing problem with regard to her left eye possibly  related to an ALLERGIC condition.  She has been followed by ophthalmologist Dr. Jackson for years.  She indicates sinus problems related to ALLERGIES and headaches.  She indicates grade 1-2 periorbital aching and pressure symptoms.  She indicates postnasal drip symptoms.  She indicates slight dizziness and equilibrium problems treated in the past with diazepam 2 mg pills +/- with meclizine.  She is asking for a new prescription for the diazepam.  She is coughing in front of me today.  She takes Claritin every morning.  She indicates use of saline spray and occasional Flonase use.    PMH: High blood pressure, arthritis  PSH: Tonsillectomy, right breast lumpectomy, partial hysterectomy, left nephrectomy  Family history: Heart disease, high blood pressure, stroke, arthritis, unspecified cancer  ALLERGIES: Codeine  Habits: One cup of coffee per day; patient quit smoking 15 years ago  Occupation: Retired  Review of Systems   Ears: Positive for ear pain and dizziness.    Nose:  Positive for postnasal drip.    Cardiovascular:  Positive for history of high blood pressure.   Other:  Positive for arthritis. Negative for rash.     Current Outpatient Prescriptions on File Prior to Visit   Medication Sig Dispense Refill    ASHWAGANDHA ROOT EXTRACT,BULK, MISC by Misc.(Non-Drug; Combo Route) route.      blood sugar diagnostic " (ACCU-CHEK FAVIOLA PLUS TEST STRP) Strp 1 strip by Misc.(Non-Drug; Combo Route) route once daily. 50 strip 6    calcium carbonate (OS-REGAN) 600 mg calcium (1,500 mg) Tab Take 600 mg by mouth once.      diclofenac sodium (VOLTAREN) 1 % Gel Apply 2 g topically daily as needed. for neck and shoulder pain - Topical 100 g 1    estradiol (ESTRACE) 0.5 MG tablet Take 1 tablet (0.5 mg total) by mouth once daily. 90 tablet 3    famotidine (PEPCID) 20 MG tablet Take 20 mg by mouth 2 (two) times daily.      fish oil-omega-3 fatty acids 300-1,000 mg capsule Take 2 g by mouth once daily.      lancets (ACCU-CHEK FASTCLIX) Misc 1 lancet by Misc.(Non-Drug; Combo Route) route once daily. 50 each 6    lisinopril (PRINIVIL,ZESTRIL) 20 MG tablet Take 1 tablet (20 mg total) by mouth once daily. 90 tablet 1    multivitamin (ONE DAILY MULTIVITAMIN) per tablet Take 1 tablet by mouth once daily.      triamterene-hydrochlorothiazide 37.5-25 mg (MAXZIDE-25) 37.5-25 mg per tablet Take 1 tablet by mouth once daily. 90 tablet 1    vitamin D 1000 units Tab Take 1,000 Units by mouth once daily.       No current facility-administered medications on file prior to visit.      Her medical problem list includes GERD improved with H2 blocker, dizziness, low back pain, left-sided sciatica, osteopenia, obesity, fatty liver, neck and shoulder pain, essential hypertension, hyperlipidemia, borderline diabetes,   history of herpes zoster, history of renal carcinoma      The patient completed an audiometric study performed by the [facility] audiology service.  The study is duplicated below and the results are reviewed with her in detail  Objective:         Blood pressure 130/86 pulse 73 temperature 98.3 height 5 feet 8 178 pounds  Gen.: Alert and oriented lady in no acute distress  Both ears were examined under the microscope in the micro-procedure room  Physical Exam   Constitutional: She is oriented to person, place, and time. She  appears well-developed and well-nourished.   HENT:   Head: Normocephalic.       Right Ear: Tympanic membrane and external ear normal. No drainage. No foreign bodies. No mastoid tenderness. Tympanic membrane is not perforated. No decreased hearing is noted.   Left Ear: Tympanic membrane and external ear normal. No drainage. No foreign bodies. No mastoid tenderness. Tympanic membrane is not perforated. No decreased hearing is noted.   Ears:    Nose: Nose normal. No nasal deformity, septal deviation or nasal septal hematoma. No epistaxis. Right sinus exhibits no maxillary sinus tenderness and no frontal sinus tenderness. Left sinus exhibits no maxillary sinus tenderness and no frontal sinus tenderness.       Mouth/Throat: Uvula is midline, oropharynx is clear and moist and mucous membranes are normal. No oral lesions. No trismus in the jaw. No uvula swelling. No oropharyngeal exudate or tonsillar abscesses.   Neck: Neck supple. No tracheal deviation present. No thyromegaly present.       Pulmonary/Chest: Effort normal. No stridor.   Lymphadenopathy:     She has no cervical adenopathy.   Neurological: She is alert and oriented to person, place, and time.   Skin: No rash noted.       Assessment:       1. History of renal cell carcinoma    2. Left epiphora    3. History of multiple allergies    4. Periorbital pain, bilateral    5. History of dizziness    6. Hearing loss, sensorineural, high frequency, bilateral    7. Post-nasal drip    8. History of shingles        Plan:     Audiometry reviewed; copy of study provided   Sinus CT ordered  May use Flonase NSS 1-2 sprays @ nostril once a day + Claritin  Rx for diazepam 2 mg # 25; take 1-2 po q 8 hours prn vertigo  Consider VNG testing pending course  Medtronic sinus CT ordered  Allergy consultation encouraged [phone]  Check with Dr. Jackson re: OS tearing

## 2018-07-31 ENCOUNTER — HOSPITAL ENCOUNTER (OUTPATIENT)
Dept: RADIOLOGY | Facility: HOSPITAL | Age: 68
Discharge: HOME | End: 2018-07-31
Attending: OTOLARYNGOLOGY
Payer: MEDICARE

## 2018-07-31 ENCOUNTER — TELEPHONE (OUTPATIENT)
Dept: UROLOGY | Facility: CLINIC | Age: 68
End: 2018-07-31

## 2018-07-31 DIAGNOSIS — Z88.9 HISTORY OF MULTIPLE ALLERGIES: ICD-10-CM

## 2018-07-31 DIAGNOSIS — Z85.528 HISTORY OF RENAL CELL CARCINOMA: ICD-10-CM

## 2018-07-31 DIAGNOSIS — H04.202 LEFT EPIPHORA: ICD-10-CM

## 2018-07-31 DIAGNOSIS — H57.13 PERIORBITAL PAIN, BILATERAL: ICD-10-CM

## 2018-07-31 PROCEDURE — 70486 CT MAXILLOFACIAL W/O DYE: CPT | Mod: TC

## 2018-07-31 PROCEDURE — 70486 CT MAXILLOFACIAL W/O DYE: CPT | Mod: 26,,, | Performed by: RADIOLOGY

## 2018-07-31 NOTE — TELEPHONE ENCOUNTER
----- Message from Deirdre Xavier sent at 7/31/2018 12:16 PM CDT -----  Contact: Self            Name of Who is Calling: Self      What is the request in detail: Pt states she is seen yearly by Dr. Ramirez. Pt states prior to scheduling  her appt she would like to speak to the clinical team to discuss which labs she needs to have done and some additional problems she is having.      Can the clinic reply by [facility]: N      What Number to Call Back if not in [facility]: [phone]

## 2018-08-10 ENCOUNTER — CLINICAL SUPPORT (OUTPATIENT)
Dept: REHABILITATION | Facility: HOSPITAL | Age: 68
End: 2018-08-10
Attending: INTERNAL MEDICINE
Payer: MEDICARE

## 2018-08-10 DIAGNOSIS — M54.42 LEFT-SIDED LOW BACK PAIN WITH LEFT-SIDED SCIATICA, UNSPECIFIED CHRONICITY: ICD-10-CM

## 2018-08-10 PROCEDURE — 97110 THERAPEUTIC EXERCISES: CPT

## 2018-08-10 PROCEDURE — G8979 MOBILITY GOAL STATUS: HCPCS | Mod: CJ

## 2018-08-10 PROCEDURE — G8978 MOBILITY CURRENT STATUS: HCPCS | Mod: CK

## 2018-08-10 PROCEDURE — 97161 PT EVAL LOW COMPLEX 20 MIN: CPT

## 2018-08-10 NOTE — PLAN OF CARE
Physical Therapy Initial Evaluation     Name: Daisy Severino  [facility] Number: [id number]    Diagnosis: No diagnosis found.  Physician: Hammad Ware MD  Treatment Orders: PT Eval and Treat  Past Medical History:   Diagnosis Date    Abnormal Pap smear of cervix     Hypertension     Renal carcinoma     left kidney stage 1 renal cell carcinoma, s/p L nephrectomy 4/16/2013     Current Outpatient Prescriptions   Medication Sig    ASHWAGANDHA ROOT EXTRACT,BULK, MISC by Misc.(Non-Drug; Combo Route) route.    blood sugar diagnostic (ACCU-CHEK FAVIOLA PLUS TEST STRP) Strp 1 strip by Misc.(Non-Drug; Combo Route) route once daily.    calcium carbonate (OS-REGAN) 600 mg calcium (1,500 mg) Tab Take 600 mg by mouth once.    diclofenac sodium (VOLTAREN) 1 % Gel Apply 2 g topically daily as needed. for neck and shoulder pain - Topical    estradiol (ESTRACE) 0.5 MG tablet Take 1 tablet (0.5 mg total) by mouth once daily.    famotidine (PEPCID) 20 MG tablet Take 20 mg by mouth 2 (two) times daily.    fish oil-omega-3 fatty acids 300-1,000 mg capsule Take 2 g by mouth once daily.    lancets (ACCU-CHEK FASTCLIX) Misc 1 lancet by Misc.(Non-Drug; Combo Route) route once daily.    lisinopril (PRINIVIL,ZESTRIL) 20 MG tablet Take 1 tablet (20 mg total) by mouth once daily.    multivitamin (ONE DAILY MULTIVITAMIN) per tablet Take 1 tablet by mouth once daily.    triamterene-hydrochlorothiazide 37.5-25 mg (MAXZIDE-25) 37.5-25 mg per tablet Take 1 tablet by mouth once daily.    vitamin D 1000 units Tab Take 1,000 Units by mouth once daily.     No current facility-administered medications for this visit.      Review of patient's allergies indicates:   Allergen Reactions    Codeine Nausea And Vomiting       Time In: 8:10 am  Time Out: 8:55 am    Evaluation Date: 8/10/18  Visit # authorized: 20  Authorization period: 12/31/18  Plan of care Expiration: 9/21/18  MD referral: L sided  LBP w/L sided sciatica    Subjective     Patient reports having renal CA 5 years ago, kidney was removed and she is 5 years cancer free. Pt states that she has had sciatica problems before but never like this. Pt states that she was on a RW for 3 weeks at one point. Sciatica is on the L side only, she states that symptoms started at the end of May 2018. Since May the pain has gotten better and she is able to walk without a RW now, but the pain is still there. Pain is dull, achy but turns into shooting pain when getting up to stand and twisting. Rubber clogs and vionic tennis shoes seem to help when walking. 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 Pt works 2-3 part time jobs, was retired but went back into the work force to occupy her.  Pt lives in a one story home without issues getting around the house. Currently, pt is most limited in walking ability and always has to be conscious of her movement. Pt denies any injections or back surgeries.  Radicular symptoms:  L side  Diagnostic Imaging: NA for back  Pain Scale: Daisy rates pain on a scale of 0-10 to be 10 at worst; 4 currently; 2 at best .  Onset: gradual  Aggravating factors:   Getting up/down, prolonged driving, twisting, prolonged walking  Easing factors:  Rest, ice, dr. Mcguire, sciatica topical cream, voltera gel  Prior Therapy: No prior therapy   Functional Deficits Leading to Referral: pain and limitations with functional mobility  Prior functional status: independent, active   Occupation:  Restaurant business                       Pts goals:  Pt wants to be pain free, walk without fear of leg buckling under her, be able to complete 3 jobs without having to modify things    Objective     Posture Alignment: forward head, L shoulder elevation    Palpation: TTP along L piriformis (deep depth - hypertonic feel), TTP along medial hamstring, TTP along lumbar paraspinals and QL (deep depth)    LUMBAR SPINE  "AROM: * denotes pain  Flexion: 80%   Extension: 5%   Left Sidebend: 25%*   Right Sidebend: 25%   Left Rotation: 50%*   Right Rotation: 50%     SEGMENTAL MOBILITY: NT    LOWER EXTREMITY STRENGTH:   Left Right   Quadriceps 5/5 5/5   Hamstrings 4+/5 5/5     Iliopsoas 4+/5 4+/5   PGM 4-/5 4/5   Hip IR 4/5 4+/5   Hip ER 4/5 4/5   Hip Ext 3/5 3+/5     Dermatomes: Sensation: Light Touch: Intact    FLEXIBILITY: B tight piriformis    Special Tests:   Left Right   Slump + minimal  -   SLR - -     GAIT: Daisy displays short step length, decreased hip extension during terminal stance, decreased stance time on L LE.     Pt/family was provided educational information, including: role of PT, goals for PT, scheduling - pt verbalized understanding. Discussed insurance limitations with pt.     TREATMENT     PT Evaluation Completed? Yes  Discussed Plan of Care with patient: Yes    Daisy received 10 minutes of therapeutic exercise including:  PPT x15  Clamshells x10 B  PF S 2x30"  Sciatic nerve glides 5 pumps x5      Written Home Exercises Provided: sciatic nerve glides, PPT, clamshells, PF S (see handout)   Daisy demo good understanding of the education provided. Patient demo good return demo of skill of exercises.    Assessment     History  Co-morbidities and personal factors that may impact the plan of care Examination  Body Structures and Functions, activity limitations and participation restrictions that may impact the plan of care    Clinical Presentation   Co-morbidities:   history of cancer and HTN        Personal Factors:   no deficits Body Regions:   back    Body Systems:    ROM  strength  gait            Participation Restrictions:   Unable to work without pain     Activity limitations:   Learning and applying knowledge  no deficits    General Tasks and Commands  no deficits    Communication  no deficits    Mobility  walking    Self care  no deficits    Domestic Life  no deficits    Interactions/Relationships  no " deficits    Life Areas  no deficits    Community and Social Life  recreation and leisure         stable and uncomplicated                      low   low  low Decision Making/ Complexity Score:  low     Patient is a 68 year old female with medical diagnosis of L sided LBP with L sided sciatica. Pt presents with deficits of hypertonicity of L piriformis, decreased lumbar ROM, LE weakness, and core weakness leading to subsequent limitations in functional mobility. Pt tolerated treatment interventions without adverse reaction. Pt prognosis is Good due to pt's active lifestyle and motivation to adhere to PT and HEP.  Pt will benefit from skilled outpatient physical therapy to address the above stated deficits, provide pt/family education and to maximize pt's level of independence.     Medical necessity is demonstrated by the following IMPAIRMENTS/PROBLEMS:  1. Increased Pain  2. Decreased Segmental Mobility & Decreased ROM  3. Decreased Core & BLE strength  4. Decreased Flexibility BLE  5. Decreased Tolerance to Functional Activities    Pt's spiritual, cultural and educational needs considered and pt agreeable to plan of care and goals as stated below:     Anticipated Barriers for physical therapy: h/o CA    Short Term GOALS: 3 weeks. Pt agrees with goals set.  1. Patient demonstrates independence with HEP.   2. Patient demonstrates independence with Postural Awareness.   3. Patient demonstrates independence with body mechanics.     Long Term GOALS: 6 weeks. Pt agrees with goals set.  1. Patient demonstrates increased lumbar ROM by 25% to improve tolerance to functional activities.   2. Patient demonstrates increased strength BLE's to 5/5 or greater to improve tolerance to functional activities.   3. Patient demonstrates improved overall function per FOTO Lumbar Survey to 27% or less.     Functional Limitations Reports - G Codes  Category: Mobility  Tool: FOTO Lumbar Survey  Score: 40% Limitation   TEST SCORE  Modifier   Impairment Limitation Restriction   0  CH  0 % impaired, limited or restricted   1-9  CI  @ least 1% but less than 20% impaired, limited or restricted   10- 19  CJ  @ least 20%<40% impaired, limited or restricted   20- 29  CK  @ least 40%<60% impaired, limited or restricted   30- 39  CL  @ least 60% <80% impaired, limited or restricted   40- 49  CM  @ least 80%<100% impaired limited or restricted   50/50  CN  100% impaired, limited or restricted     Current/: CK = 40% Limitation  Goal/ : CJ = 27% Limitation     PLAN     Outpatient physical therapy 2 times weekly to include: pt ed, hep, therapeutic exercises, neuromuscular re-education/ balance exercises, joint mobilizations, aquatic therapy and modalities prn. Cont PT for  6 weeks. Pt may be seen by PTA as part of the rehabilitation team.     Therapist: Nika Delgado, SPT  8/8/2018

## 2018-08-14 ENCOUNTER — CLINICAL SUPPORT (OUTPATIENT)
Dept: REHABILITATION | Facility: HOSPITAL | Age: 68
End: 2018-08-14
Attending: INTERNAL MEDICINE
Payer: MEDICARE

## 2018-08-14 DIAGNOSIS — M47.22 OSTEOARTHRITIS OF SPINE WITH RADICULOPATHY, CERVICAL REGION: ICD-10-CM

## 2018-08-14 DIAGNOSIS — M54.42 LEFT-SIDED LOW BACK PAIN WITH LEFT-SIDED SCIATICA, UNSPECIFIED CHRONICITY: Primary | ICD-10-CM

## 2018-08-14 PROCEDURE — 97110 THERAPEUTIC EXERCISES: CPT

## 2018-08-14 PROCEDURE — 97140 MANUAL THERAPY 1/> REGIONS: CPT

## 2018-08-14 NOTE — PROGRESS NOTES
"TIME RECORD    Date:  08/14/2018    Start Time:  10:35 am   Stop Time:  11:25 am     PROCEDURES:    TIMED  Procedure Time Min.   MT Start:10:35 am   Stop:10:45 am  10   TE Start:10:45 am   Stop:11:15 am  30         UNTIMED  Procedure Time Min.   MHP Start:11:15 am   Stop:11:25 am  10      Total Timed Minutes:  40  Total Timed Units:  3  Total Untimed Units:  1  Charges Billed/# of units:  1 MT, 2 TE       Progress/Current Status    Subjective:     Patient ID: Daisy Severino is a 68 y.o. female.  Diagnosis:   1. Left-sided low back pain with left-sided sciatica, unspecified chronicity     2. Osteoarthritis of spine with radiculopathy, cervical region       Pain: 5 /10  Patient states I am still hurting right here with patient pointing at lift piriformis and buttock region when I cough, sneeze or laugh and I don't know why.     Objective:     Patient received MT x 10 minutes consisting of patient in right sidelying for STM/MFR to left posterior/lateral hip and piriformis musculature including use of roller to left lateral LE and patient in prone for STM/MFR to left proximal hamstring musculature including foam roll to left posterior proximal LE. Patient able to complete therex per log as below 1:1 with PT x 30 minutes followed by MHP to upper posterior thigh and low back region with patient half seated on heat pad while sitting in chair with back support for 10 minutes.     PPT x15  Clamshells x10 B  PF S 2x30"  Sciatic nerve glides 5 pumps x5  Bridge 2x10   March 2x10  HS stretch with strap 30"x3 B  SLR 2x10   Crab walk RTB 2 laps    Assessment:     Patient with increase in tissue tension and tenderness to palpation at left piriformis and proximal hamstring at attachment to ischial tuberosity which improved with manual therapy however still limited. Patient with fair TA activation requiring verbal instruction to ensure normal respiration throughout all exercises with patient able to complete without any complaints of " increase in pain.     Patient Education/Response:     Patient educated to continue to complete HEP on days not attending therapy with patient demonstrating understanding.    Plans and Goals:     Continue as mckay, progress with manual therapy, self stretching and core stabilization as able.     Short Term GOALS: 3 weeks. Pt agrees with goals set.  1. Patient demonstrates independence with HEP.   2. Patient demonstrates independence with Postural Awareness.   3. Patient demonstrates independence with body mechanics.      Long Term GOALS: 6 weeks. Pt agrees with goals set.  1. Patient demonstrates increased lumbar ROM by 25% to improve tolerance to functional activities.   2. Patient demonstrates increased strength BLE's to 5/5 or greater to improve tolerance to functional activities.   3. Patient demonstrates improved overall function per FOTO Lumbar Survey to 27% or less.

## 2018-08-16 ENCOUNTER — CLINICAL SUPPORT (OUTPATIENT)
Dept: REHABILITATION | Facility: HOSPITAL | Age: 68
End: 2018-08-16
Attending: INTERNAL MEDICINE
Payer: MEDICARE

## 2018-08-16 DIAGNOSIS — M54.42 LEFT-SIDED LOW BACK PAIN WITH LEFT-SIDED SCIATICA, UNSPECIFIED CHRONICITY: Primary | ICD-10-CM

## 2018-08-16 DIAGNOSIS — M47.22 OSTEOARTHRITIS OF SPINE WITH RADICULOPATHY, CERVICAL REGION: ICD-10-CM

## 2018-08-16 PROCEDURE — 97110 THERAPEUTIC EXERCISES: CPT

## 2018-08-16 NOTE — PROGRESS NOTES
"                                                    Physical Therapy Daily Note     Name: Daisy [facility] Number: [id number]  Diagnosis: No diagnosis found.  Physician: Hammad Ware MD  Precautions: Standard  Visit #: 3/20  PTA Visit #: --  Time In: 8:40 am (pt 10 min late)  Time Out: 9:40 am  Total Treatment Time 1:1: 30 min    Evaluation Date: 8/10/18  Visit # authorized: 20  Authorization period: 12/31/18  Plan of care Expiration: 9/21/18  MD referral: L sided LBP w/L sided sciatica    Subjective     Pt reports being sore after last treatment session because she went to the gym following PT.  Pain Scale: Daisy rates pain at lumbar spine on a scale of 0-10 to be 2 currently.    Objective     Daisy received individual therapeutic exercises to develop strength, endurance, posture and core stabilization for 50 minutes including:  PPT 3x10 5" holds  Sciatic nerve glides 5 pumps x5  Bridge 2x10   March 2x10  Clamshells 3x10 B  PF S 2x30"  HS stretch with strap 30"x3 B  SLR 2x10   Crab walk RTB 2 laps    After being cleared for contraindications pt received MHP to glutes (seated on MHP) for 10 min post-treatment    Written Home Exercises Provided: As per eval  Pt demo good understanding of the education provided. Daisy demonstrated good return demonstration of activities.     Education provided re: ARLINE Villa verbalized good understanding of education provided.   No spiritual or educational barriers to learning provided    Assessment     Patient tolerated treatment interventions without adverse reaction. VC for maintenance of core activation with SLR and supine marches. VC to maintain mini-squat during crabwalks. Cont to benefit from PT.   This is a 68 y.o. female referred to outpatient physical therapy and presents with a medical diagnosis of L sided LBP and demonstrates limitations as described in the problem list. Pt prognosis is Good. Pt will continue to benefit from skilled outpatient physical " therapy to address the deficits listed in the problem list, provide pt/family education and to maximize pt's level of independence in the home and community environment.     Goals as follows:  Short Term GOALS: 3 weeks. Pt agrees with goals set.  1. Patient demonstrates independence with HEP.   2. Patient demonstrates independence with Postural Awareness.   3. Patient demonstrates independence with body mechanics.      Long Term GOALS: 6 weeks. Pt agrees with goals set.  1. Patient demonstrates increased lumbar ROM by 25% to improve tolerance to functional activities.   2. Patient demonstrates increased strength BLE's to 5/5 or greater to improve tolerance to functional activities.   3. Patient demonstrates improved overall function per FOTO Lumbar Survey to 27% or less.      Plan     Continue with established Plan of Care towards PT goals.    Therapist: ENZO Shah  8/16/2018

## 2018-08-21 ENCOUNTER — CLINICAL SUPPORT (OUTPATIENT)
Dept: REHABILITATION | Facility: HOSPITAL | Age: 68
End: 2018-08-21
Attending: INTERNAL MEDICINE
Payer: MEDICARE

## 2018-08-21 DIAGNOSIS — M47.22 OSTEOARTHRITIS OF SPINE WITH RADICULOPATHY, CERVICAL REGION: ICD-10-CM

## 2018-08-21 DIAGNOSIS — M54.42 CHRONIC LEFT-SIDED LOW BACK PAIN WITH LEFT-SIDED SCIATICA: Primary | ICD-10-CM

## 2018-08-21 DIAGNOSIS — G89.29 CHRONIC LEFT-SIDED LOW BACK PAIN WITH LEFT-SIDED SCIATICA: Primary | ICD-10-CM

## 2018-08-21 PROCEDURE — 97110 THERAPEUTIC EXERCISES: CPT

## 2018-08-21 NOTE — PROGRESS NOTES
"                                                    Physical Therapy Daily Note     Name: Daisy [facility] Number: [id number]  Diagnosis:   Encounter Diagnoses   Name Primary?    Chronic left-sided low back pain with left-sided sciatica Yes    Osteoarthritis of spine with radiculopathy, cervical region      Physician: Hammad Ware MD  Precautions: Standard  Visit #: 4/20  PTA Visit #: --  Time In: 1300  Time Out: 1400  Total Treatment Time 1:1: 48 min    Evaluation Date: 8/10/18  Visit # authorized: 20  Authorization period: 12/31/18  Plan of care Expiration: 9/21/18  MD referral: L sided LBP w/L sided sciatica    Subjective     Pt reports 2/10 LBP pre-tx. Pt reports that she feels like PT has been helping so far and she doesn't have any radicular sx. Pt reports she got really sore after the [facility] last session because she sprained her ACL in the past; reports is was a muscle soreness and it went away.    Objective     Daisy received individual therapeutic exercises to develop strength, endurance, posture and core stabilization for 48 minutes including:    Bike lvl 3 x5 min  PPT 3x10 5" holds  PPT with March 3x10  LTR x15  Sciatic nerve glides 5 pumps x10 LLE  Bridge 3x10   Clamshells 3x10 B  piriformis stretch 3x30" B  HS stretch with strap 30"x3 B  SLR 2x10   Crab walk RTB 2 laps    After being cleared for contraindications pt received MHP to glutes for 10 min post-treatment- pt prefers seated on MH to lying down    Written Home Exercises Provided: As per eval  Pt demo good understanding of the education provided. Daisy demonstrated good return demonstration of activities.     Education provided re: ARLINE Villa verbalized good understanding of education provided.   No spiritual or educational barriers to learning provided    Assessment     Patient tolerated treatment interventions without adverse reaction. Pt required verbal cues for core contraction during all exercises. Pt reported a cramp in " her low back during PPT with march so she was given LTR to relax the back. Pt demos improved strength with bridges. Pt will continue to benefit from skilled PT in order to decrease pain, increase core stability and strength, and improve functional mobility.  This is a 68 y.o. female referred to outpatient physical therapy and presents with a medical diagnosis of L sided LBP and demonstrates limitations as described in the problem list. Pt prognosis is Good. Pt will continue to benefit from skilled outpatient physical therapy to address the deficits listed in the problem list, provide pt/family education and to maximize pt's level of independence in the home and community environment.     Goals as follows:  Short Term GOALS: 3 weeks. Pt agrees with goals set.  1. Patient demonstrates independence with HEP.   2. Patient demonstrates independence with Postural Awareness.   3. Patient demonstrates independence with body mechanics.      Long Term GOALS: 6 weeks. Pt agrees with goals set.  1. Patient demonstrates increased lumbar ROM by 25% to improve tolerance to functional activities.   2. Patient demonstrates increased strength BLE's to 5/5 or greater to improve tolerance to functional activities.   3. Patient demonstrates improved overall function per FOTO Lumbar Survey to 27% or less.      Plan     Continue with established Plan of Care towards PT goals.    Therapist: Rupinder Cohen, PT  8/21/2018

## 2018-08-23 ENCOUNTER — CLINICAL SUPPORT (OUTPATIENT)
Dept: REHABILITATION | Facility: HOSPITAL | Age: 68
End: 2018-08-23
Attending: INTERNAL MEDICINE
Payer: MEDICARE

## 2018-08-23 DIAGNOSIS — M54.42 CHRONIC LEFT-SIDED LOW BACK PAIN WITH LEFT-SIDED SCIATICA: Primary | ICD-10-CM

## 2018-08-23 DIAGNOSIS — G89.29 CHRONIC LEFT-SIDED LOW BACK PAIN WITH LEFT-SIDED SCIATICA: Primary | ICD-10-CM

## 2018-08-23 DIAGNOSIS — M47.22 OSTEOARTHRITIS OF SPINE WITH RADICULOPATHY, CERVICAL REGION: ICD-10-CM

## 2018-08-23 PROCEDURE — 97110 THERAPEUTIC EXERCISES: CPT

## 2018-08-23 NOTE — PROGRESS NOTES
"                                                    Physical Therapy Daily Note     Name: Daisy [facility] Number: [id number]  Diagnosis:   No diagnosis found.  Physician: Hammad Ware MD  Precautions: Standard  Visit #: 5/20  PTA Visit #: --  Time In: 7:40 am   Time Out: 8:50 am   Total Treatment Time 1:1: 70 min    Evaluation Date: 8/10/18  Visit # authorized: 20  Authorization period: 12/31/18  Plan of care Expiration: 9/21/18  MD referral: L sided LBP w/L sided sciatica    Subjective     Pt reports that her leg is bothering her today but she has been doing a lot lately. Pt states 0/10 LBP today but 3/10 pain in the L LE.     Objective     Daisy received individual therapeutic exercises to develop strength, endurance, posture and core stabilization for 60 minutes including:    Bike lvl 3 x5 min  PPT 3x10 5" holds  PPT with March 3x10  LTR x15  Sciatic nerve glides 5 pumps x20 LLE  Bridge 3x10   piriformis stretch 3x30" B  HS stretch with strap 30"x3 B  SLR 2x10   Clamshells 3x10 B  Crab walk RTB 2 laps    After being cleared for contraindications pt received MHP to glutes for 10 min post-treatment- pt prefers seated on MH     Written Home Exercises Provided: As per eval  Pt demo good understanding of the education provided. Daisy demonstrated good return demonstration of activities.     Education provided re: ARLINE Villa verbalized good understanding of education provided.   No spiritual or educational barriers to learning provided    Assessment     Patient tolerated treatment interventions without adverse reaction. Pt with improved ability to perform crabwalks without complaints. Pt will continue to benefit from skilled PT in order to decrease pain, increase core stability and strength, and improve functional mobility.  This is a 68 y.o. female referred to outpatient physical therapy and presents with a medical diagnosis of L sided LBP and demonstrates limitations as described in the problem list. " Pt prognosis is Good. Pt will continue to benefit from skilled outpatient physical therapy to address the deficits listed in the problem list, provide pt/family education and to maximize pt's level of independence in the home and community environment.     Goals as follows:  Short Term GOALS: 3 weeks. Pt agrees with goals set.  1. Patient demonstrates independence with HEP.   2. Patient demonstrates independence with Postural Awareness.   3. Patient demonstrates independence with body mechanics.      Long Term GOALS: 6 weeks. Pt agrees with goals set.  1. Patient demonstrates increased lumbar ROM by 25% to improve tolerance to functional activities.   2. Patient demonstrates increased strength BLE's to 5/5 or greater to improve tolerance to functional activities.   3. Patient demonstrates improved overall function per FOTO Lumbar Survey to 27% or less.      Plan     Continue with established Plan of Care towards PT goals.    Therapist: ENZO Shah  8/23/2018

## 2018-08-27 ENCOUNTER — PATIENT MESSAGE (OUTPATIENT)
Dept: UROLOGY | Facility: CLINIC | Age: 68
End: 2018-08-27

## 2018-08-27 RX ORDER — ESTRADIOL 0.5 MG/1
0.5 TABLET ORAL DAILY
Qty: 90 TABLET | Refills: 3 | Status: SHIPPED | OUTPATIENT
Start: 2018-08-27 | End: 2019-11-14 | Stop reason: SDUPTHER

## 2018-08-27 NOTE — TELEPHONE ENCOUNTER
----- Message from Denise Ackerman sent at 8/27/2018 11:34 AM CDT -----  Contact: self, [phone]  Patient states she was supposed to get a call last week regarding her hormone Estradiol prescription.

## 2018-08-28 ENCOUNTER — CLINICAL SUPPORT (OUTPATIENT)
Dept: REHABILITATION | Facility: HOSPITAL | Age: 68
End: 2018-08-28
Attending: INTERNAL MEDICINE
Payer: MEDICARE

## 2018-08-28 DIAGNOSIS — M54.42 CHRONIC LEFT-SIDED LOW BACK PAIN WITH LEFT-SIDED SCIATICA: Primary | ICD-10-CM

## 2018-08-28 DIAGNOSIS — G89.29 CHRONIC LEFT-SIDED LOW BACK PAIN WITH LEFT-SIDED SCIATICA: Primary | ICD-10-CM

## 2018-08-28 DIAGNOSIS — M47.22 OSTEOARTHRITIS OF SPINE WITH RADICULOPATHY, CERVICAL REGION: ICD-10-CM

## 2018-08-28 PROCEDURE — 97110 THERAPEUTIC EXERCISES: CPT

## 2018-08-28 NOTE — PROGRESS NOTES
"                                                    Physical Therapy Daily Note     Name: [facility] Number: [id number]  Diagnosis:   Encounter Diagnoses   Name Primary?    Chronic left-sided low back pain with left-sided sciatica Yes    Osteoarthritis of spine with radiculopathy, cervical region      Physician: Hammad Ware MD  Precautions: Standard  Visit #: 6/20 *FOTO and Gcode next visit  PTA Visit #: --  Time In: 1544  Time Out: 1653   Total Treatment Time 1:1: 69 min    Evaluation Date: 8/10/18  Visit # authorized: 20  Authorization period: 12/31/18  Plan of care Expiration: 9/21/18  MD referral: L sided LBP w/L sided sciatica    Subjective     Pt reports that her back and LLE are bothering her some but not too much. She has been running around a lot today. Pt reports she was very sore in her left leg after last treatment session for a day.    Objective     Daisy received individual therapeutic exercises to develop strength, endurance, posture and core stabilization for 64 minutes including:    Bike lvl 3 x5 min  ITB stretch L 3x30"  PPT 3x10 5" holds  PPT with LE extension 3x10  LTR x15  Sciatic nerve glides 5 pumps x20 LLE  Bridge 3x10  With adduction squeeze  Clamshells 3x10 B YTB  piriformis stretch 3x30" B  HS stretch with strap 30"x3 B  SLR 3x10   Crab walk RTB 2 laps      Pt received the following manual therapy techniques for 5 minutes    STM L piriformis    After being cleared for contraindications pt received MHP to glutes for 10 min post-treatment- pt prefers seated on MH     Written Home Exercises Provided: As per eval  Pt demo good understanding of the education provided. Daisy demonstrated good return demonstration of activities.     Education provided re: ARLINE Villa verbalized good understanding of education provided.   No spiritual or educational barriers to learning provided    Assessment     Patient tolerated treatment interventions without adverse reaction. Pt with " increased tenderness to palpation L piriformis and L ITB. Pt with lateral tracking of L patella with quad set. Performed STM to L piriformis to decrease muscle tightness and increase soft tissue extensibility. Added ITB stretch and bridges with adduction squeeze to improve patella tracking. Pt will continue to benefit from skilled PT in order to decrease pain, increase core/LE strength and stability, and improve functional mobility.  This is a 68 y.o. female referred to outpatient physical therapy and presents with a medical diagnosis of L sided LBP and demonstrates limitations as described in the problem list. Pt prognosis is Good. Pt will continue to benefit from skilled outpatient physical therapy to address the deficits listed in the problem list, provide pt/family education and to maximize pt's level of independence in the home and community environment.     Goals as follows:  Short Term GOALS: 3 weeks. Pt agrees with goals set.  1. Patient demonstrates independence with HEP.   2. Patient demonstrates independence with Postural Awareness.   3. Patient demonstrates independence with body mechanics.      Long Term GOALS: 6 weeks. Pt agrees with goals set.  1. Patient demonstrates increased lumbar ROM by 25% to improve tolerance to functional activities.   2. Patient demonstrates increased strength BLE's to 5/5 or greater to improve tolerance to functional activities.   3. Patient demonstrates improved overall function per FOTO Lumbar Survey to 27% or less.      Plan     Continue with established Plan of Care towards PT goals.    Therapist: Rupinder Cohen, PT  8/28/2018

## 2018-08-28 NOTE — TELEPHONE ENCOUNTER
Pt stated insurance not covering estradiol. Pt advised it is on the $4 list m [facility] and a #90 day supply is only $10. Pt requests to have Rx sent there    no

## 2018-08-30 ENCOUNTER — PATIENT MESSAGE (OUTPATIENT)
Dept: INTERNAL MEDICINE | Facility: CLINIC | Age: 68
End: 2018-08-30

## 2018-08-30 NOTE — TELEPHONE ENCOUNTER
Needs urgent visit. Msg sent to patient. [facility] or call in for UC visit tomorrow AM; please check in with her tomorrow AM to review red flag symptoms re: back pain and make sure she gets scheduled.

## 2018-08-31 ENCOUNTER — PATIENT MESSAGE (OUTPATIENT)
Dept: INTERNAL MEDICINE | Facility: CLINIC | Age: 68
End: 2018-08-31

## 2018-08-31 NOTE — TELEPHONE ENCOUNTER
Patient not able to come to UC visit today.  She can call in through [facility], or call [facility] to see about appointments at UC clinics over the weekend. Msg sent to patient

## 2018-09-28 ENCOUNTER — PATIENT MESSAGE (OUTPATIENT)
Dept: INTERNAL MEDICINE | Facility: CLINIC | Age: 68
End: 2018-09-28

## 2018-10-02 DIAGNOSIS — R73.03 BORDERLINE DIABETES MELLITUS: ICD-10-CM

## 2018-10-02 RX ORDER — LANCETS
EACH MISCELLANEOUS
Qty: 102 EACH | Refills: 6 | Status: SHIPPED | OUTPATIENT
Start: 2018-10-02 | End: 2022-10-25 | Stop reason: SDUPTHER

## 2018-10-02 RX ORDER — BLOOD SUGAR DIAGNOSTIC
STRIP MISCELLANEOUS
Qty: 50 STRIP | Refills: 6 | Status: SHIPPED | OUTPATIENT
Start: 2018-10-02 | End: 2019-01-22 | Stop reason: SDUPTHER

## 2018-10-12 ENCOUNTER — OFFICE VISIT (OUTPATIENT)
Dept: UROLOGY | Facility: CLINIC | Age: 68
End: 2018-10-12
Payer: MEDICARE

## 2018-10-12 VITALS
HEIGHT: 60 IN | DIASTOLIC BLOOD PRESSURE: 60 MMHG | SYSTOLIC BLOOD PRESSURE: 136 MMHG | HEART RATE: 67 BPM | BODY MASS INDEX: 34.79 KG/M2

## 2018-10-12 DIAGNOSIS — C64.9 CANCER OF KIDNEY, UNSPECIFIED LATERALITY: Primary | ICD-10-CM

## 2018-10-12 LAB
BILIRUB SERPL-MCNC: NORMAL MG/DL
BLOOD URINE, POC: NORMAL
COLOR, POC UA: NORMAL
GLUCOSE UR QL STRIP: NORMAL
KETONES UR QL STRIP: NORMAL
LEUKOCYTE ESTERASE URINE, POC: NORMAL
NITRITE, POC UA: NORMAL
PH, POC UA: 5
PROTEIN, POC: NORMAL
SPECIFIC GRAVITY, POC UA: 1.01
UROBILINOGEN, POC UA: NORMAL

## 2018-10-12 PROCEDURE — 99213 OFFICE O/P EST LOW 20 MIN: CPT | Mod: 25,S$GLB,, | Performed by: UROLOGY

## 2018-10-12 PROCEDURE — 81002 URINALYSIS NONAUTO W/O SCOPE: CPT | Mod: S$GLB,,, | Performed by: UROLOGY

## 2018-10-12 RX ORDER — LORATADINE 10 MG/1
10 TABLET ORAL DAILY
COMMUNITY

## 2018-10-12 NOTE — PROGRESS NOTES
Subjective:      Daisy Severino is a 68 y.o. female who returns today regarding her     Six years status post left nephrectomy.  No  complaints.  Her main issue right now is sciatica which is improving    She saw her primary physician Dr. Ware for her liver hemangioma.    The following portions of the patient's history were reviewed and updated as appropriate: allergies, current medications, past family history, past medical history, past social history, past surgical history and problem list.    Review of Systems  Pertinent items are noted in HPI.  A comprehensive multipoint review of systems was negative except as otherwise stated in the HPI.     Objective:   Vitals: /60   Pulse 67   Ht 5' (1.524 m)   BMI 34.79 kg/m²     Physical Exam   General: alert and oriented, no acute distress  Respiratory: Symmetric expansion, non-labored breathing  Cardiovascular: no peripheral edema  Abdomen: non distended -  Skin: normal coloration and turgor, no rashes, no suspicious skin lesions noted  Neuro: no gross deficits  Psych: normal judgment and insight, normal mood/affect and non-anxious  No cervical adenopathy  Left flank incision well healed    Physical Exam    Lab Review   Urinalysis demonstrates negative for all components  Lab Results   Component Value Date    WBC 7.51 07/19/2018    HGB 13.6 07/19/2018    HCT 42.9 07/19/2018    MCV 89 07/19/2018     07/19/2018     Lab Results   Component Value Date    CREATININE 1.0 07/19/2018    BUN 30 (H) 07/19/2018       Imaging  -  Assessment and Plan:   Cancer of kidney, unspecified laterality  conventional [id number] ROSA MARIA*6years    Return to clinic 1 year for examination.  We will order imaging only as needed    Follow-up with primary physician

## 2019-01-16 DIAGNOSIS — I10 ESSENTIAL HYPERTENSION: ICD-10-CM

## 2019-01-16 RX ORDER — LISINOPRIL 20 MG/1
TABLET ORAL
Qty: 90 TABLET | Refills: 0 | Status: SHIPPED | OUTPATIENT
Start: 2019-01-16 | End: 2019-04-02 | Stop reason: SDUPTHER

## 2019-01-17 NOTE — TELEPHONE ENCOUNTER
Refill sent, patient due for routine EP f/u on HTN, please contact to schedule. Next available ok unless she has more urgent concern

## 2019-01-18 DIAGNOSIS — I10 ESSENTIAL HYPERTENSION: ICD-10-CM

## 2019-01-18 RX ORDER — TRIAMTERENE/HYDROCHLOROTHIAZID 37.5-25 MG
1 TABLET ORAL DAILY
Qty: 90 TABLET | Refills: 0 | Status: SHIPPED | OUTPATIENT
Start: 2019-01-18 | End: 2019-04-05 | Stop reason: SDUPTHER

## 2019-01-22 DIAGNOSIS — R73.03 BORDERLINE DIABETES MELLITUS: ICD-10-CM

## 2019-01-22 NOTE — TELEPHONE ENCOUNTER
----- Message from Beti Barr sent at 1/22/2019  8:31 AM CST -----  Contact: [facility]  Prescription Clarification:     The pharmacy needs clarity on the following Rx:    ACCU-CHEK FAVIOLA PLUS TEST STRP Strp    Requesting new Rx for Medicare Part B    Pharmacy: [facility] [address] [place] [address]    Thank You

## 2019-02-27 DIAGNOSIS — Z12.11 SCREENING FOR COLON CANCER: Primary | ICD-10-CM

## 2019-03-01 ENCOUNTER — PATIENT OUTREACH (OUTPATIENT)
Dept: ADMINISTRATIVE | Facility: HOSPITAL | Age: 69
End: 2019-03-01

## 2019-03-01 NOTE — PROGRESS NOTES
Pre-visit audit complete, pt due for a mammogram and colon cancer screening.  Attempted to contact pt to schedule mammogram and offer Fit[employer].  Voice message left requesting a return call.

## 2019-03-07 ENCOUNTER — PATIENT MESSAGE (OUTPATIENT)
Dept: INTERNAL MEDICINE | Facility: CLINIC | Age: 69
End: 2019-03-07

## 2019-03-17 ENCOUNTER — HOSPITAL ENCOUNTER (EMERGENCY)
Facility: HOSPITAL | Age: 69
Discharge: HOME | End: 2019-03-17
Attending: EMERGENCY MEDICINE
Payer: MEDICARE

## 2019-03-17 VITALS
DIASTOLIC BLOOD PRESSURE: 74 MMHG | SYSTOLIC BLOOD PRESSURE: 158 MMHG | RESPIRATION RATE: 17 BRPM | TEMPERATURE: 99 F | HEART RATE: 65 BPM | OXYGEN SATURATION: 98 %

## 2019-03-17 DIAGNOSIS — S42.124A CLOSED NONDISPLACED FRACTURE OF RIGHT ACROMIAL PROCESS, INITIAL ENCOUNTER: ICD-10-CM
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
## 2019-03-17 DIAGNOSIS — S43.006A SHOULDER DISLOCATION: Primary | ICD-10-CM

## 2019-03-17 PROCEDURE — 99284 EMERGENCY DEPT VISIT MOD MDM: CPT | Mod: 25

## 2019-03-17 PROCEDURE — 63600175 PHARM REV CODE 636 W HCPCS

## 2019-03-17 PROCEDURE — 23575 CLTX SCAP FX W/MNPJ +-TRACTJ: CPT | Mod: 54,RT,, | Performed by: PHYSICIAN ASSISTANT

## 2019-03-17 PROCEDURE — 96375 TX/PRO/DX INJ NEW DRUG ADDON: CPT

## 2019-03-17 PROCEDURE — 96374 THER/PROPH/DIAG INJ IV PUSH: CPT

## 2019-03-17 PROCEDURE — 23650 CLTX SHO DSLC W/MNPJ WO ANES: CPT | Mod: RT

## 2019-03-17 PROCEDURE — 63600175 PHARM REV CODE 636 W HCPCS: Performed by: PHYSICIAN ASSISTANT

## 2019-03-17 PROCEDURE — 99284 PR EMERGENCY DEPT VISIT,LEVEL IV: ICD-10-PCS | Mod: 57,25,, | Performed by: PHYSICIAN ASSISTANT

## 2019-03-17 PROCEDURE — 99284 EMERGENCY DEPT VISIT MOD MDM: CPT | Mod: 57,25,, | Performed by: PHYSICIAN ASSISTANT

## 2019-03-17 PROCEDURE — 23575 PR CLOSED RX SCAPULA FX,MANIPULATN: ICD-10-PCS | Mod: 54,RT,, | Performed by: PHYSICIAN ASSISTANT

## 2019-03-17 PROCEDURE — 23575 CLTX SCAP FX W/MNPJ +-TRACTJ: CPT

## 2019-03-17 RX ORDER — ONDANSETRON 2 MG/ML
4 INJECTION INTRAMUSCULAR; INTRAVENOUS
Status: COMPLETED | OUTPATIENT
Start: 2019-03-17 | End: 2019-03-17

## 2019-03-17 RX ORDER — PROPOFOL 10 MG/ML
INJECTION, EMULSION INTRAVENOUS
Status: COMPLETED
Start: 2019-03-17 | End: 2019-03-17

## 2019-03-17 RX ORDER — ONDANSETRON 2 MG/ML
INJECTION INTRAMUSCULAR; INTRAVENOUS
Status: COMPLETED
Start: 2019-03-17 | End: 2019-03-17

## 2019-03-17 RX ORDER — PROPOFOL 10 MG/ML
40 VIAL (ML) INTRAVENOUS
Status: COMPLETED | OUTPATIENT
Start: 2019-03-17 | End: 2019-03-17

## 2019-03-17 RX ORDER — HYDROMORPHONE HYDROCHLORIDE 1 MG/ML
0.5 INJECTION, SOLUTION INTRAMUSCULAR; INTRAVENOUS; SUBCUTANEOUS
Status: COMPLETED | OUTPATIENT
Start: 2019-03-17 | End: 2019-03-17

## 2019-03-17 RX ADMIN — HYDROMORPHONE HYDROCHLORIDE 0.5 MG: 1 INJECTION, SOLUTION INTRAMUSCULAR; INTRAVENOUS; SUBCUTANEOUS at 02:03

## 2019-03-17 RX ADMIN — ONDANSETRON 4 MG: 2 INJECTION INTRAMUSCULAR; INTRAVENOUS at 05:03

## 2019-03-17 RX ADMIN — Medication 50 MG: at 05:03

## 2019-03-17 RX ADMIN — PROPOFOL 50 MG: 10 INJECTION, EMULSION INTRAVENOUS at 05:03

## 2019-03-17 NOTE — ED TRIAGE NOTES
"Pt FOOSH R arm at 10:30 at home after tripping over floor fan. Obvious deformity to R shoulder. Pt in sling applied by EMS. Pt states pain 10/10. Describes "heavy" feeling in R arm. Denies numbness/tingling. +2 distal pulses. <2sec cap refill. Pt received IV zofran and fentanyl.  "

## 2019-03-17 NOTE — ED NOTES
Pt states that she only has one kidney (right). Pt reports losing L kidney to renal cell cancer in 2013.

## 2019-03-17 NOTE — DISCHARGE INSTRUCTIONS
Follow up with Orthopedics within the next week for re-evaluation.  Return to the ER immediately for any new or worsening symptoms.

## 2019-03-17 NOTE — ED PROVIDER NOTES
Encounter Date: 3/17/2019       History     Chief Complaint   Patient presents with    Shoulder Injury     Pt brought in by  EMS. Pt tripped and fell, injured right shoulder. Pt has deformity, CMS intact. NO LOC     70 yo F presents to the ED with a shoulder injury after a fall on outstretched hand to the side.  Patient reports pain to the right upper extremity worsen the shoulder.  She denies head injury. She denies numbness or tingling.  Patient arrived in the ED in a sling may by EMS.          Review of patient's allergies indicates:   Allergen Reactions    Codeine Nausea And Vomiting     Past Medical History:   Diagnosis Date    Abnormal Pap smear of cervix     Cancer of kidney     Hypertension     Renal carcinoma     left kidney stage 1 renal cell carcinoma, s/p L nephrectomy 4/16/2013    Sciatica      Past Surgical History:   Procedure Laterality Date    BREAST LUMPECTOMY  age 16    benign    HYSTERECTOMY  4/1988    partial hysterectomy for heavy bleeding    kidney removal  4/2014    left kidney    NEPHRECTOMY Left      Family History   Problem Relation Age of Onset    Cancer Father 61        bladder cancer, lung cancer (smoker)    Breast cancer Maternal Aunt     Breast cancer Maternal Aunt     Diabetes Maternal Grandmother     Diabetes Paternal Grandmother     Cancer Paternal Grandfather         head & neck cancer (smoker)     Social History     Tobacco Use    Smoking status: Never Smoker    Smokeless tobacco: Never Used   Substance Use Topics    Alcohol use: Yes     Comment: occasional wine    Drug use: No     Review of Systems   Constitutional: Negative for fever.   HENT: Negative for sore throat.    Respiratory: Negative for shortness of breath.    Cardiovascular: Negative for chest pain.   Gastrointestinal: Negative for nausea.   Genitourinary: Negative for dysuria.   Musculoskeletal: Positive for arthralgias (Right upper extremity pain). Negative for back pain and neck pain.    Skin: Negative for rash.   Neurological: Negative for weakness and numbness.   Hematological: Does not bruise/bleed easily.       Physical Exam     Initial Vitals [03/17/19 0109]   BP Pulse Resp Temp SpO2   (!) 160/110 67 18 98.8 °F (37.1 °C) 99 %      MAP       --         Physical Exam    Nursing note and vitals reviewed.  Constitutional: She appears well-developed and well-nourished. She is not diaphoretic.  Non-toxic appearance. She does not appear ill. No distress.   HENT:   Head: Normocephalic and atraumatic.   Neck: Neck supple.   Cardiovascular: Normal rate and regular rhythm. Exam reveals no gallop and no friction rub.    No murmur heard.  Pulmonary/Chest: Effort normal and breath sounds normal. No accessory muscle usage. No tachypnea. No respiratory distress. She has no decreased breath sounds. She has no wheezes. She has no rhonchi. She has no rales.   Abdominal: She exhibits no distension.   Musculoskeletal:   Mild deformity noted to the right shoulder.  Patient unable to range the shoulder due to pain. Patient also has pain to the wrist, elbow, proximal humerus.  No ecchymosis. Radial pulse intact. Sensation intact.   Neurological: She is alert.   Skin: No rash noted.   Psychiatric: She has a normal mood and affect. Her behavior is normal.         ED Course   Procedural Sedation  Date/Time: 3/17/2019 5:30 AM  Performed by: Analisa Devries MD  Authorized by: Analisa Devries MD   Consent Done: Yes  Consent: Written consent obtained.  Risks and benefits: risks, benefits and alternatives were discussed  Consent given by: patient  Patient understanding: patient states understanding of the procedure being performed  Patient consent: the patient's understanding of the procedure matches consent given  Procedure consent: procedure consent matches procedure scheduled  Relevant documents: relevant documents present and verified  Patient identity confirmed: name  Time out: Immediately prior to  "procedure a "time out" was called to verify the correct patient, procedure, equipment, support staff and site/side marked as required.  Equipment: on supplemental oxygen. and on CO2 monitor.   Sedation type: moderate (conscious) sedation  (See MAR for exact dosages of medications).  Sedatives: propofol  Sedation start date/time: 3/17/2019 5:20 AM  Sedation end date/time: 3/17/2019 5:27 AM  Vitals: Vital signs were monitored during sedation.  Complications: No complications.     Orthopedic Injury  Date/Time: 3/17/2019 6:01 AM  Performed by: Carolina Dumont PA-C  Authorized by: Analisa Devries MD     Consent Done?:  Yes  Universal Protocol:     Written consent obtained?: Yes    Injury:     Injury location:  Shoulder    Location details:  Right shoulder    Injury type:  Fracture-dislocation    Dislocation type: anterior        Pre-procedure assessment:     Neurovascular status: Neurovascularly intact      Patient sedated?: Yes        Selections made in this section will also lock the Injury type section above.:     Manipulation performed?: Yes      Reduction successful?: Yes      Confirmation: Reduction confirmed by x-ray      Immobilization:  Sling  Post-procedure assessment:     Neurovascular status: Neurovascularly intact      Patient tolerance:  Patient tolerated the procedure well with no immediate complications      Labs Reviewed - No data to display       Imaging Results          X-ray Shoulder 2 or More Views Right (Final result)  Result time 03/17/19 05:47:08   Procedure changed from X-Ray Shoulder 1 View Right     Final result by Patrice Roe MD (03/17/19 05:47:08)                 Impression:      1.  Interval reduction of previously demonstrated glenohumeral dislocation.    2.  Previously visualized deformity of the distal acromion may relate to os acromiale versus minimally displaced fracture, noting this is better visualized on prior radiograph series.    3.  Narrowing of the acromial " humeral interval which may be positional, although can be seen with underlying rotator cuff insufficiency.      Electronically signed by: Patrice Roe MD  Date:    03/17/2019  Time:    05:47             Narrative:    EXAMINATION:  XR SHOULDER COMPLETE 2 OR MORE VIEWS RIGHT    CLINICAL HISTORY:  shoulder dislocation;Unspecified dislocation of unspecified shoulder joint, initial encounter    TECHNIQUE:  Two or three views of the right shoulder were performed.    COMPARISON:  Right shoulder radiograph series 03/17/2019 02:34 hours    FINDINGS:  There has been interval reduction of previously demonstrated glenohumeral dislocation.  There is narrowing of the acromial humeral interval which may relate to patient positioning, although can be seen with underlying rotator cuff insufficiency.  The previously visualized deformity of the distal acromion may relate to an os acromiale versus minimally displaced fracture, and is better visualized on prior shoulder radiograph series.                               X-Ray Shoulder Trauma Right (Final result)  Result time 03/17/19 03:09:41    Final result by Daryl Armstrong MD (03/17/19 03:09:41)                 Impression:      Acute anteroinferior right glenohumeral dislocation.    Nondisplaced fracture of the distal acromion process.    No additional fracture. No dislocation. No elevation of the elbow fat pad.    Ulna minus variant left wrist.  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      Electronically signed by: Daryl Armstrong MD  Date:    03/17/2019  Time:    03:09             Narrative:    EXAMINATION:  XR HUMERUS 2 VIEW RIGHT; XR WRIST COMPLETE 3 VIEWS RIGHT; XR ELBOW COMPLETE 3 VIEW RIGHT; XR FOREARM RIGHT; XR SHOULDER TRAUMA 3 VIEW RIGHT    CLINICAL HISTORY:  Unspecified fall, initial encounter    TECHNIQUE:  Right shoulder two views, right humerus two views, right elbow three views, right forearm two views and right wrist three  views.    COMPARISON:  None    FINDINGS:  Acute anteroinferior right glenohumeral dislocation.  Nondisplaced fracture of the distal acromion process.  No additional fracture.  No dislocation.  No elevation of the elbow fat pad.  Ulna minus variant left wrist.  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                               X-Ray Wrist Complete Right (Final result)  Result time 03/17/19 03:09:41    Final result by Daryl Armstrong MD (03/17/19 03:09:41)                 Impression:      Acute anteroinferior right glenohumeral dislocation.    Nondisplaced fracture of the distal acromion process.    No additional fracture. No dislocation. No elevation of the elbow fat pad.    Ulna minus variant left wrist.  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      Electronically signed by: Daryl Armstrong MD  Date:    03/17/2019  Time:    03:09             Narrative:    EXAMINATION:  XR HUMERUS 2 VIEW RIGHT; XR WRIST COMPLETE 3 VIEWS RIGHT; XR ELBOW COMPLETE 3 VIEW RIGHT; XR FOREARM RIGHT; XR SHOULDER TRAUMA 3 VIEW RIGHT    CLINICAL HISTORY:  Unspecified fall, initial encounter    TECHNIQUE:  Right shoulder two views, right humerus two views, right elbow three views, right forearm two views and right wrist three views.    COMPARISON:  None    FINDINGS:  Acute anteroinferior right glenohumeral dislocation.  Nondisplaced fracture of the distal acromion process.  No additional fracture.  No dislocation.  No elevation of the elbow fat pad.  Ulna minus variant left wrist.  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                               X-Ray Humerus 2 View Right (Final result)  Result time 03/17/19 03:09:41    Final result by Daryl Armstrong MD (03/17/19 03:09:41)                 Impression:      Acute anteroinferior right glenohumeral dislocation.    Nondisplaced fracture of the distal acromion process.    No  additional fracture. No dislocation. No elevation of the elbow fat pad.    Ulna minus variant left wrist.  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      Electronically signed by: Daryl Armstrong MD  Date:    03/17/2019  Time:    03:09             Narrative:    EXAMINATION:  XR HUMERUS 2 VIEW RIGHT; XR WRIST COMPLETE 3 VIEWS RIGHT; XR ELBOW COMPLETE 3 VIEW RIGHT; XR FOREARM RIGHT; XR SHOULDER TRAUMA 3 VIEW RIGHT    CLINICAL HISTORY:  Unspecified fall, initial encounter    TECHNIQUE:  Right shoulder two views, right humerus two views, right elbow three views, right forearm two views and right wrist three views.    COMPARISON:  None    FINDINGS:  Acute anteroinferior right glenohumeral dislocation.  Nondisplaced fracture of the distal acromion process.  No additional fracture.  No dislocation.  No elevation of the elbow fat pad.  Ulna minus variant left wrist.  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                               X-Ray Forearm Right (Final result)  Result time 03/17/19 03:09:41    Final result by Daryl Armstrong MD (03/17/19 03:09:41)                 Impression:      Acute anteroinferior right glenohumeral dislocation.    Nondisplaced fracture of the distal acromion process.    No additional fracture. No dislocation. No elevation of the elbow fat pad.    Ulna minus variant left wrist.  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      Electronically signed by: Daryl Armstrong MD  Date:    03/17/2019  Time:    03:09             Narrative:    EXAMINATION:  XR HUMERUS 2 VIEW RIGHT; XR WRIST COMPLETE 3 VIEWS RIGHT; XR ELBOW COMPLETE 3 VIEW RIGHT; XR FOREARM RIGHT; XR SHOULDER TRAUMA 3 VIEW RIGHT    CLINICAL HISTORY:  Unspecified fall, initial encounter    TECHNIQUE:  Right shoulder two views, right humerus two views, right elbow three views, right forearm two views and right wrist three  views.    COMPARISON:  None    FINDINGS:  Acute anteroinferior right glenohumeral dislocation.  Nondisplaced fracture of the distal acromion process.  No additional fracture.  No dislocation.  No elevation of the elbow fat pad.  Ulna minus variant left wrist.  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                               X-Ray Elbow Complete Right (Final result)  Result time 03/17/19 03:09:41    Final result by Daryl Armstrong MD (03/17/19 03:09:41)                 Impression:      Acute anteroinferior right glenohumeral dislocation.    Nondisplaced fracture of the distal acromion process.    No additional fracture. No dislocation. No elevation of the elbow fat pad.    Ulna minus variant left wrist.  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      Electronically signed by: Daryl Armstrong MD  Date:    03/17/2019  Time:    03:09             Narrative:    EXAMINATION:  XR HUMERUS 2 VIEW RIGHT; XR WRIST COMPLETE 3 VIEWS RIGHT; XR ELBOW COMPLETE 3 VIEW RIGHT; XR FOREARM RIGHT; XR SHOULDER TRAUMA 3 VIEW RIGHT    CLINICAL HISTORY:  Unspecified fall, initial encounter    TECHNIQUE:  Right shoulder two views, right humerus two views, right elbow three views, right forearm two views and right wrist three views.    COMPARISON:  None    FINDINGS:  Acute anteroinferior right glenohumeral dislocation.  Nondisplaced fracture of the distal acromion process.  No additional fracture.  No dislocation.  No elevation of the elbow fat pad.  Ulna minus variant left wrist.  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                                 Medical Decision Making:   History:   Old Medical Records: I decided to obtain old medical records.  Differential Diagnosis:   My differential diagnosis includes but is not limited to:  Fracture, dislocation, sprain, tendon injury, contusion  Independently Interpreted Test(s):   I have  ordered and independently interpreted X-rays - see prior notes.  Clinical Tests:   Radiological Study: Ordered and Reviewed  Other:   I have discussed this case with another health care provider.       <> Summary of the Discussion: Orthopedics       APC / Resident Notes:   68 yo F presents to the ED with shoulder injury after a mechanical fall.  Shoulder dislocation with fracture of the acromion noted on xray. Pt is neurovascularly intact.  Dislocation was reduced successfully in the ED under conscious sedation. No immediate complications. Post reduction films confirm reduction. Will discharge in a sling with ortho follow up within a week. Pt verbalized understanding and is comfortable with plan.  By shift end, pt was still being monitored after sedation. Pt alert and oriented, responding appropriately, but still somewhat unsteady on her feet. She will be discharged once able to walk with steady gait.   I have reviewed the patient's records and discussed this case with my supervising physician.                   Clinical Impression:       ICD-10-CM ICD-9-CM   1. Shoulder dislocation S43.006A 831.00   2. Fall W19.XXXA E888.9   3. Closed nondisplaced fracture of right acromial process, initial encounter S42.124A 811.01         Disposition:   Disposition: Discharged  Condition: Stable                        Carolina Dumont PA-C  03/17/19 [id number]

## 2019-03-17 NOTE — ED NOTES
"LOC: The patient is awake, alert, and oriented to place, time, situation. Affect is appropriate.  Speech is appropriate and clear.     APPEARANCE: Patient resting comfortably in no acute distress.  Patient is clean and well groomed.    SKIN: The skin is warm and dry; color consistent with ethnicity.  Patient has normal skin turgor and moist mucus membranes.  Skin intact; no breakdown, rash or bruising noted.     MUSCULOSKELETAL: Patient moving LUE and BLE extremities without difficulty.  Denies weakness and fatigue. FOOSH after tripping over fan with resulting R shoulder pain and obvious deformity. Sling placed by EMS. Pt states 10/10 pain. Tender to palpate. Denies tingling/numbness. 2+ distal pulses <2sec cap refill. Reports "heaviness" in R arm.     RESPIRATORY: Airway is open and patent. Respirations spontaneous, even, easy, and non-labored.  Patient has a normal effort and rate.  No accessory muscle use noted. Denies cough. Patient denies sob.    CARDIAC:  No peripheral edema noted. No complaints of chest pain.     ABDOMEN: Soft and non tender to palpation.  No distention noted. Patient denies v/d. Reports nausea in ambulance. Was given IV zofran. Denies nausea at this time.     NEUROLOGIC: Eyes open spontaneously.  Behavior appropriate to situation.  Follows commands; facial expression symmetrical.  Purposeful motor response noted; normal sensation in all extremities. Patient denies headache and dizziness.  "

## 2019-03-18 ENCOUNTER — PES CALL (OUTPATIENT)
Dept: ADMINISTRATIVE | Facility: CLINIC | Age: 69
End: 2019-03-18

## 2019-03-18 ENCOUNTER — TELEPHONE (OUTPATIENT)
Dept: SPORTS MEDICINE | Facility: CLINIC | Age: 69
End: 2019-03-18

## 2019-03-18 NOTE — TELEPHONE ENCOUNTER
----- Message from Ann Marie Root RN sent at 3/18/2019  2:08 PM CDT -----  Contact: Patient      ----- Message -----  From: Tran Schmidt  Sent: 3/18/2019   1:37 PM  To: [facility] Ortho Clinical Support    Patient has discharge instructions to follow up in ortho on 3.20.19    Would like a call back at [phone]    Thanks  KB

## 2019-03-18 NOTE — TELEPHONE ENCOUNTER
Ortho Telephone Triage Message  [phone]  Patient C/O: R shoulder dislocation s/p trip/fall while at home doing housework yesterday and seen at [facility]. Reduction/sling placed.  Pt requests Ortho follow up appt.   HX: Closed nondisplaced fracture of R acromial process 3/17/19  Triage Advice:Maintain sling, NWB and follow ED discharge instructions until seen by Orthopedics.   Resolution:Pt states understanding. Appt scheduled tomorrow with / Sports Medicine at 1:00pm with arrival at 12:45pm. Pt confirms time and location of appt and is active in [facility] for additional appt information. Pt has OOC contact number, if needed, in interim.

## 2019-03-19 ENCOUNTER — OFFICE VISIT (OUTPATIENT)
Dept: SPORTS MEDICINE | Facility: CLINIC | Age: 69
End: 2019-03-19
Payer: MEDICARE

## 2019-03-19 ENCOUNTER — HOSPITAL ENCOUNTER (OUTPATIENT)
Dept: RADIOLOGY | Facility: HOSPITAL | Age: 69
Discharge: HOME | End: 2019-03-19
Attending: ORTHOPAEDIC SURGERY
Payer: MEDICARE

## 2019-03-19 VITALS
DIASTOLIC BLOOD PRESSURE: 79 MMHG | SYSTOLIC BLOOD PRESSURE: 128 MMHG | WEIGHT: 178 LBS | HEIGHT: 60 IN | BODY MASS INDEX: 34.95 KG/M2 | HEART RATE: 79 BPM

## 2019-03-19 DIAGNOSIS — F41.9 ANXIETY: Primary | ICD-10-CM

## 2019-03-19 DIAGNOSIS — M25.511 ACUTE PAIN OF RIGHT SHOULDER: Primary | ICD-10-CM

## 2019-03-19 DIAGNOSIS — M54.50 LOW BACK PAIN: ICD-10-CM

## 2019-03-19 DIAGNOSIS — M25.511 ACUTE PAIN OF RIGHT SHOULDER: ICD-10-CM

## 2019-03-19 PROCEDURE — 99999 PR PBB SHADOW E&M-EST. PATIENT-LVL III: ICD-10-PCS | Mod: PBBFAC,,, | Performed by: ORTHOPAEDIC SURGERY

## 2019-03-19 PROCEDURE — 73030 X-RAY EXAM OF SHOULDER: CPT | Mod: 26,RT,, | Performed by: RADIOLOGY

## 2019-03-19 PROCEDURE — 73030 X-RAY EXAM OF SHOULDER: CPT | Mod: TC,FY,PO,RT

## 2019-03-19 PROCEDURE — 99203 PR OFFICE/OUTPT VISIT, NEW, LEVL III, 30-44 MIN: ICD-10-PCS | Mod: S$PBB,,, | Performed by: ORTHOPAEDIC SURGERY

## 2019-03-19 PROCEDURE — 99213 OFFICE O/P EST LOW 20 MIN: CPT | Mod: PBBFAC,25,PO | Performed by: ORTHOPAEDIC SURGERY

## 2019-03-19 PROCEDURE — 99999 PR PBB SHADOW E&M-EST. PATIENT-LVL III: CPT | Mod: PBBFAC,,, | Performed by: ORTHOPAEDIC SURGERY

## 2019-03-19 PROCEDURE — 73030 XR SHOULDER COMPLETE 2 OR MORE VIEWS RIGHT: ICD-10-PCS | Mod: 26,RT,, | Performed by: RADIOLOGY

## 2019-03-19 PROCEDURE — 99203 OFFICE O/P NEW LOW 30 MIN: CPT | Mod: S$PBB,,, | Performed by: ORTHOPAEDIC SURGERY

## 2019-03-19 RX ORDER — DIAZEPAM 5 MG/1
TABLET ORAL
Qty: 2 TABLET | Refills: 0 | Status: SHIPPED | OUTPATIENT
Start: 2019-03-19 | End: 2020-06-21

## 2019-03-19 NOTE — PROGRESS NOTES
CC: RIGHT shoulder pain     69 y.o. Female with a history of right shoulder pain who had a FOOSH injury on 3/17/19, resulting in a GH dislocation.  Was seen in the ED for reduction.    She reports that the pain and weakness is worse with overhead activity. It also bothers her at night.    Is affecting ADLs.  Pain is 1/10 at it's worst.      Past Medical History:   Diagnosis Date    Abnormal Pap smear of cervix     Cancer of kidney     Hypertension     Renal carcinoma     left kidney stage 1 renal cell carcinoma, s/p L nephrectomy 4/16/2013    Sciatica        Past Surgical History:   Procedure Laterality Date    BREAST LUMPECTOMY  age 16    benign    HYSTERECTOMY  4/1988    partial hysterectomy for heavy bleeding    kidney removal  4/2014    left kidney    NEPHRECTOMY Left        Family History   Problem Relation Age of Onset    Cancer Father 61        bladder cancer, lung cancer (smoker)    Breast cancer Maternal Aunt     Breast cancer Maternal Aunt     Diabetes Maternal Grandmother     Diabetes Paternal Grandmother     Cancer Paternal Grandfather         head & neck cancer (smoker)         Current Outpatient Medications:     ACCU-CHEK FASTCLIX LANCET DRUM Misc, USE TO TEST BLOOD GLUCOSE ONCE DAILY, Disp: 102 each, Rfl: 6    ASHWAGANDHA ROOT EXTRACT,BULK, MISC, by Misc.(Non-Drug; Combo Route) route., Disp: , Rfl:     blood sugar diagnostic (ACCU-CHEK FAVIOLA PLUS TEST STRP) Strp, USE ONE STRIP TO CHECK GLUCOSE ONCE DAILY, Disp: 50 strip, Rfl: 6    calcium carbonate (OS-REGAN) 600 mg calcium (1,500 mg) Tab, Take 600 mg by mouth once., Disp: , Rfl:     diclofenac sodium (VOLTAREN) 1 % Gel, Apply 2 g topically daily as needed. for neck and shoulder pain - Topical, Disp: 100 g, Rfl: 1    estradiol (ESTRACE) 0.5 MG tablet, Take 1 tablet (0.5 mg total) by mouth once daily., Disp: 90 tablet, Rfl: 3    famotidine (PEPCID) 20 MG tablet, Take 20 mg by mouth 2 (two) times daily., Disp: , Rfl:     fish  oil-omega-3 fatty acids 300-1,000 mg capsule, Take 2 g by mouth once daily., Disp: , Rfl:     lisinopril (PRINIVIL,ZESTRIL) 20 MG tablet, TAKE 1 TABLET BY MOUTH ONCE DAILY, Disp: 90 tablet, Rfl: 0    loratadine (CLARITIN) 10 mg tablet, Take 10 mg by mouth once daily., Disp: , Rfl:     multivitamin (ONE DAILY MULTIVITAMIN) per tablet, Take 1 tablet by mouth once daily., Disp: , Rfl:     triamterene-hydrochlorothiazide 37.5-25 mg (MAXZIDE-25) 37.5-25 mg per tablet, TAKE 1 TABLET BY MOUTH ONCE DAILY, Disp: 90 tablet, Rfl: 0    vitamin D 1000 units Tab, Take 1,000 Units by mouth once daily., Disp: , Rfl:     Review of patient's allergies indicates:   Allergen Reactions    Codeine Nausea And Vomiting          REVIEW OF SYSTEMS:  Constitution: Negative. Negative for chills, fever and night sweats.   HENT: Negative for congestion and headaches.    Eyes: Negative for blurred vision, left vision loss and right vision loss.   Cardiovascular: Negative for chest pain and syncope.   Respiratory: Negative for cough and shortness of breath.    Endocrine: Negative for polydipsia, polyphagia and polyuria.   Hematologic/Lymphatic: Negative for bleeding problem. Does not bruise/bleed easily.   Skin: Negative for dry skin, itching and rash.   Musculoskeletal: Negative for falls.  Positive for right shoulder pain and muscle weakness.   Gastrointestinal: Negative for abdominal pain and bowel incontinence.   Genitourinary: Negative for bladder incontinence and nocturia.   Neurological: Negative for disturbances in coordination, loss of balance and seizures.   Psychiatric/Behavioral: Negative for depression. The patient does not have insomnia.    Allergic/Immunologic: Negative for hives and persistent infections.      PHYSICAL EXAMINATION:  Vitals:  /79   Pulse 79   Ht 5' (1.524 m)   Wt 80.7 kg (178 lb)   BMI 34.76 kg/m²    General: The patient is alert and oriented x 3.  Mood is pleasant.  Observation of ears, eyes and  nose reveal no gross abnormalities.  No labored breathing observed.  Gait is coordinated. Patient can toe walk and heel walk without difficulty.      RIGHT Shoulder / Upper Extremity Exam    OBSERVATION:     Swelling  none  Deformity  none   Discoloration  none   Scapular winging none   Scars   none  Atrophy  none    TENDERNESS / CREPITUS (T/C):          T/C      T/C   Clavicle   -/-  SUPRAspinatus    -/-     AC Jt.    -/-  INFRAspinatus  -/-    SC Jt.    -/-  Deltoid    -/-      G. Tuberosity  -/-  LH BICEP groove  -/-   Acromion:  -/-  Midline Neck   -/-     Scapular Spine -/-  Trapezium   -/-   SMA Scapula  -/-  GH jt. line - post  -/-     Scapulothoracic  -/-         ROM: (* = with pain)  Left shoulder   Right shoulder        AROM (PROM)   AROM (PROM)   FE    170° (175°)     NT     ER at 0°    60°  (65°)    NT   ER at 90° ABD  90°  (90°)    NT   IR at 90°  ABD   NA  (40°)     NT      IR (spine level)   T10     NT    STRENGTH: (* = with pain) Left shoulder   Right shoulder   SCAPTION   5/5    NT/5    IR    5/5    NT/5   ER    5/5    NT/5   BICEPS   5/5    NT/5   Deltoid    5/5    NT/5     NO SPECIAL TESTING PERFORMED TODAY.    EXTREMITY NEURO-VASCULAR EXAM:    Sensation grossly intact to light touch all dermatomal regions.    DTR 2+ Biceps, Triceps, BR and Negative Allans sign   Grossly intact motor function at Elbow, Wrist and Hand   Distal pulses radial and ulnar 2+, brisk cap refill, symmetric.      NECK:  Painless FROM and spinous processes non-tender. Negative Spurlings sign.      OTHER FINDINGS:  + scapular dyskinesia    ASSESSMENT:   Right shoulder pain, s/p GH dislocation, probable rotator cuff tear    PLAN:    1. MRI to rule out rotator cuff tear  2. Follow up in clinic to discuss MRI results  3. Continue sling for comfort    All questions were answered, patient will contact us for questions or concerns in the interim.

## 2019-03-25 ENCOUNTER — TELEPHONE (OUTPATIENT)
Dept: SPORTS MEDICINE | Facility: CLINIC | Age: 69
End: 2019-03-25

## 2019-03-25 NOTE — TELEPHONE ENCOUNTER
----- Message from Cris Cardenas sent at 3/25/2019  3:15 PM CDT -----  Contact: [email or web address]  Patient called regarding the status of her authorization for MRI.

## 2019-03-26 ENCOUNTER — CLINICAL SUPPORT (OUTPATIENT)
Dept: REHABILITATION | Facility: HOSPITAL | Age: 69
End: 2019-03-26
Payer: MEDICARE

## 2019-03-26 ENCOUNTER — HOSPITAL ENCOUNTER (OUTPATIENT)
Dept: RADIOLOGY | Facility: HOSPITAL | Age: 69
Discharge: HOME | End: 2019-03-26
Attending: STUDENT IN AN ORGANIZED HEALTH CARE EDUCATION/TRAINING PROGRAM
Payer: MEDICARE

## 2019-03-26 DIAGNOSIS — R52 PAIN: ICD-10-CM

## 2019-03-26 DIAGNOSIS — M25.511 ACUTE PAIN OF RIGHT SHOULDER: ICD-10-CM

## 2019-03-26 DIAGNOSIS — M54.50 LOW BACK PAIN: ICD-10-CM

## 2019-03-26 PROCEDURE — G8978 MOBILITY CURRENT STATUS: HCPCS | Mod: CK | Performed by: PHYSICAL THERAPIST

## 2019-03-26 PROCEDURE — 73221 MRI JOINT UPR EXTREM W/O DYE: CPT | Mod: TC,RT

## 2019-03-26 PROCEDURE — 73221 MRI JOINT UPR EXTREM W/O DYE: CPT | Mod: 26,RT,, | Performed by: RADIOLOGY

## 2019-03-26 PROCEDURE — 97161 PT EVAL LOW COMPLEX 20 MIN: CPT | Performed by: PHYSICAL THERAPIST

## 2019-03-26 PROCEDURE — 97110 THERAPEUTIC EXERCISES: CPT | Performed by: PHYSICAL THERAPIST

## 2019-03-26 PROCEDURE — G8979 MOBILITY GOAL STATUS: HCPCS | Mod: CJ | Performed by: PHYSICAL THERAPIST

## 2019-03-26 PROCEDURE — 73221 MRI SHOULDER WITHOUT CONTRAST RIGHT: ICD-10-PCS | Mod: 26,RT,, | Performed by: RADIOLOGY

## 2019-03-26 NOTE — PLAN OF CARE
[facility]  Physical Therapy Initial Evaluation    Name: Daisy Severino  Clinic Number: [id number]    Therapy Diagnosis:   Encounter Diagnosis   Name Primary?    Pain      Physician: Jose Nava MD    Physician Orders: PT Eval and Treat   Medical Diagnosis: M25.511 (ICD-10-CM) - Acute pain of right shoulder   Evaluation Date: 3/26/2019  Authorization Period Expiration: 3-18-20  Plan of Care Certification Period: 5-07-19  Visit # / Visits authorized: 1/ 1    Time In: 1:15 pm  Time Out: 2:05 pm  Total Billable Time: 30 minutes    Precautions: Standard    Subjective     Date of onset: 3-17-19  History of current condition - Daisy reports: she dislocated R shld in a fall on outstretched arm; reduced in ER.  Pt c/o pain with motion.       Past Medical History:   Diagnosis Date    Abnormal Pap smear of cervix     Cancer of kidney     Hypertension     Renal carcinoma     left kidney stage 1 renal cell carcinoma, s/p L nephrectomy 4/16/2013    Sciatica      Daisy Severino  has a past surgical history that includes kidney removal (4/2014); Breast lumpectomy (age 16); Hysterectomy (4/1988); and Nephrectomy (Left).    Daisy has a current medication list which includes the following prescription(s): accu-chek fastclix lancet drum, ashwagandha root extract, blood sugar diagnostic, calcium carbonate, diazepam, diclofenac sodium, estradiol, famotidine, fish oil-omega-3 fatty acids, lisinopril, loratadine, multivitamin, triamterene-hydrochlorothiazide 37.5-25 mg, and vitamin d.    Review of patient's allergies indicates:   Allergen Reactions    Codeine Nausea And Vomiting        Imaging: x-ray:  Reduction of recent shoulder dislocation.  DJD.     Prior Therapy: PT for LBP  Social History:  lives with an adult    Occupation:   Prior Level of Function: independent with ADL  Current Level of Function: ADL limited by pain    Pain:  Current 1/10, worst 5/10, best 0/10   Location:  right shoulder   Description: Aching  Aggravating Factors: Lifting  Easing Factors: rest    Pts goals: full RTW    Objective     Postural examination:  Slumped shld     Functional assessment:   - walking:   independent             AROM:  WFL R shld; PROM is 70 deg ER and others WFL-WNL without pain     MMT:   4-/5 ER and 4+/5 others    Tone:  Decreased shld girdle    Flexibility testing:   Anterior laxity    Special tests:   Weakness with empty can    Palpation:   No TTP    Joint mobility: anterior laxity    Swelling:  MIN    Other:  Sensation intact to light touch    CMS Impairment/Limitation/Restriction for FOTO UE Survey    Therapist reviewed FOTO scores for Daisy Severino on 3/26/2019.   FOTO documents entered into [employer] - see Media section.    Limitation Score: 47%  Category: Mobility    Current : CK = at least 40% but < 60% impaired, limited or restricted  Goal: CJ = at least 20% but < 40% impaired, limited or restricted           TREATMENT     Treatment Time In: 1:15 pm  Treatment Time Out: 2:05 pm  Total Treatment time separate from Evaluation time:  35 min    Treatment:  HP x 10 min, shrugs, scap retractions, AA-flexion, PROM x 8 min and CP x 10 min.    Home Exercises and Patient Education Provided    Education provided:   - ice for pain    Written Home Exercises Provided: yes.  Exercises were reviewed and Daisy was able to demonstrate them prior to the end of the session.  Daisy demonstrated good  understanding of the education provided.     See EMR under Patient Instructions for exercises provided 3/26/2019.      Assessment     Daisy is a 69 y.o. female referred to outpatient Physical Therapy with a medical diagnosis of M25.511 (ICD-10-CM) - Acute pain of right shoulder   .  Pt presents with R shld pain, weakness and decreased ROM.  Probable RTC tear.    Pt prognosis is Fair.   Pt will benefit from skilled outpatient Physical Therapy to address the deficits stated above and in the chart below, provide  pt/family education, and to maximize pt's level of independence.     Plan of care discussed with patient: Yes  Pt's spiritual, cultural and educational needs considered and patient is agreeable to the plan of care and goals as stated below:     Anticipated Barriers for therapy: possible RTC tear    Medical Necessity is demonstrated by the following:  History  Co-morbidities and personal factors that may impact the plan of care Co-morbidities:   HTN    Personal Factors:   no deficits     low   Examination  Body Structures and Functions, activity limitations and participation restrictions that may impact the plan of care Body Regions:   upper extremities    Body Systems:    ROM  strength    Participation Restrictions:   No liftig    Activity limitations:   Learning and applying knowledge  no deficits    General Tasks and Commands  no deficits    Communication  no deficits    Mobility  lifting and carrying objects    Self care  no deficits    Domestic Life  doing house work (cleaning house, washing dishes, laundry)    Interactions/Relationships  no deficits    Life Areas  no deficits    Community and Social Life  no deficits         low   Clinical Presentation stable and uncomplicated low   Decision Making/ Complexity Score: low     Goals:  Short Term Goals: 2 weeks         1.   Independent with HEP        2.  Pt will report decreased pain level of < 50% from above measure with ADL    Long Term Goals:   GOALS:    6_   weeks. Pt agrees with goals set.  1. Independent with HEP.  2. Report decreased    R shld    pain  <   / =  2/10 with ADL such as housework  3. Increased MMT  for  R shld to 4/5 to 5/5  with ADL such as wok duties  4. Increased arom  for  R shld to WNL with functional activities such walking or self-care      Plan     Certification Period/Plan of care expiration: 3/26/2019 to 5-07-19.    Outpatient Physical Therapy 1 times weekly for 6 weeks to include the following interventions: Manual Therapy, Moist  Heat/ Ice, Patient Education and Therapeutic Exercise.     Prashanth Casiano, PT

## 2019-03-27 ENCOUNTER — TELEPHONE (OUTPATIENT)
Dept: SPORTS MEDICINE | Facility: CLINIC | Age: 69
End: 2019-03-27

## 2019-03-27 NOTE — TELEPHONE ENCOUNTER
----- Message from Alayna Winchester sent at 3/27/2019  1:32 PM CDT -----  Contact: self  Needs Advice    Reason for call: Pt states that she had an ED visit on last night w/her right shoulder dislocating Pt ask for a call        Communication Preference: [phone]    Additional Information: n/a

## 2019-03-27 NOTE — TELEPHONE ENCOUNTER
Returning call to patient.  Reviewed ED note; she dislocated her shoulder after MRI yesterday.  She already has an appointment tomorrow with Ania.  Will re-evaluate/discuss further at appointment.

## 2019-03-28 ENCOUNTER — TELEPHONE (OUTPATIENT)
Dept: INTERNAL MEDICINE | Facility: CLINIC | Age: 69
End: 2019-03-28

## 2019-03-28 ENCOUNTER — HOSPITAL ENCOUNTER (OUTPATIENT)
Dept: RADIOLOGY | Facility: HOSPITAL | Age: 69
Discharge: HOME | End: 2019-03-28
Attending: PHYSICIAN ASSISTANT
Payer: MEDICARE

## 2019-03-28 ENCOUNTER — OFFICE VISIT (OUTPATIENT)
Dept: SPORTS MEDICINE | Facility: CLINIC | Age: 69
End: 2019-03-28
Payer: MEDICARE

## 2019-03-28 VITALS
HEIGHT: 60 IN | WEIGHT: 178 LBS | BODY MASS INDEX: 34.95 KG/M2 | DIASTOLIC BLOOD PRESSURE: 74 MMHG | HEART RATE: 70 BPM | SYSTOLIC BLOOD PRESSURE: 140 MMHG

## 2019-03-28 DIAGNOSIS — Z85.528 HISTORY OF RENAL CARCINOMA: ICD-10-CM

## 2019-03-28 DIAGNOSIS — R73.03 BORDERLINE DIABETES MELLITUS: ICD-10-CM

## 2019-03-28 DIAGNOSIS — M25.511 RIGHT SHOULDER PAIN, UNSPECIFIED CHRONICITY: Primary | ICD-10-CM

## 2019-03-28 DIAGNOSIS — M25.511 RIGHT SHOULDER PAIN, UNSPECIFIED CHRONICITY: ICD-10-CM

## 2019-03-28 DIAGNOSIS — Z01.818 PRE-OP EVALUATION: ICD-10-CM

## 2019-03-28 DIAGNOSIS — I10 ESSENTIAL HYPERTENSION: Primary | ICD-10-CM

## 2019-03-28 PROCEDURE — 99999 PR PBB SHADOW E&M-EST. PATIENT-LVL III: ICD-10-PCS | Mod: PBBFAC,,, | Performed by: PHYSICIAN ASSISTANT

## 2019-03-28 PROCEDURE — 99215 PR OFFICE/OUTPT VISIT, EST, LEVL V, 40-54 MIN: ICD-10-PCS | Mod: S$PBB,,, | Performed by: PHYSICIAN ASSISTANT

## 2019-03-28 PROCEDURE — 99215 OFFICE O/P EST HI 40 MIN: CPT | Mod: S$PBB,,, | Performed by: PHYSICIAN ASSISTANT

## 2019-03-28 PROCEDURE — 99999 PR PBB SHADOW E&M-EST. PATIENT-LVL III: CPT | Mod: PBBFAC,,, | Performed by: PHYSICIAN ASSISTANT

## 2019-03-28 PROCEDURE — 73030 XR SHOULDER COMPLETE 2 OR MORE VIEWS RIGHT: ICD-10-PCS | Mod: 26,RT,, | Performed by: RADIOLOGY

## 2019-03-28 PROCEDURE — 73030 X-RAY EXAM OF SHOULDER: CPT | Mod: TC,FY,PO,RT

## 2019-03-28 PROCEDURE — 73030 X-RAY EXAM OF SHOULDER: CPT | Mod: 26,RT,, | Performed by: RADIOLOGY

## 2019-03-28 PROCEDURE — 99213 OFFICE O/P EST LOW 20 MIN: CPT | Mod: PBBFAC,25,PO | Performed by: PHYSICIAN ASSISTANT

## 2019-03-28 NOTE — TELEPHONE ENCOUNTER
----- Message from Marj Laboy MA sent at 3/28/2019 12:51 PM CDT -----  Dr. Ware,    Dr. Timmons is recommending surgery for your mutual patient.  Prior to surgery they will need to have the following completed through your office:    -EKG  -Blood work  -Letter stating medically cleared for surgery    Pre op - 04/09/19  Surgery - 04/22/19     Please feel free to contact myself or Dr. Timmons with questions.    Thank you,    Marj Laboy   Medical Assistant to Dr. Heath Timmons

## 2019-03-28 NOTE — PROGRESS NOTES
CC: RIGHT shoulder pain     69 y.o. Female RHD with a history of right shoulder pain x 3/26/19 following dislocation. She went to push herself out of bed and right shoulder dislocated. She had to go to the ED to get it reduced. This was two days ago and she has been in a sling since. One other prior dislocation on 3/16/19. This was her first one, following a trip and fall. She had to go to the ED for reduction. She then had an MRI performed. No prior shoulder surgery. She reports a history of physical jobs and wear and tear on the shoulder. She cannot take NSAIDs due to only having one kidney. She is using voltaren gel.      She reports that the pain and weakness is worse with overhead activity. It also bothers her at night.    Is affecting ADLs.  Pain is 2/10 at it's worst.    Past Medical History:   Diagnosis Date    Abnormal Pap smear of cervix     Cancer of kidney     Hypertension     Renal carcinoma     left kidney stage 1 renal cell carcinoma, s/p L nephrectomy 4/16/2013    Sciatica        Past Surgical History:   Procedure Laterality Date    BREAST LUMPECTOMY  age 16    benign    HYSTERECTOMY  4/1988    partial hysterectomy for heavy bleeding    kidney removal  4/2014    left kidney    NEPHRECTOMY Left        Family History   Problem Relation Age of Onset    Cancer Father 61        bladder cancer, lung cancer (smoker)    Breast cancer Maternal Aunt     Breast cancer Maternal Aunt     Diabetes Maternal Grandmother     Diabetes Paternal Grandmother     Cancer Paternal Grandfather         head & neck cancer (smoker)         Current Outpatient Medications:     ACCU-CHEK FASTCLIX LANCET DRUM Misc, USE TO TEST BLOOD GLUCOSE ONCE DAILY, Disp: 102 each, Rfl: 6    ASHWAGANDHA ROOT EXTRACT,BULK, MISC, by Misc.(Non-Drug; Combo Route) route., Disp: , Rfl:     blood sugar diagnostic (ACCU-CHEK FAVIOLA PLUS TEST STRP) Strp, USE ONE STRIP TO CHECK GLUCOSE ONCE DAILY, Disp: 50 strip, Rfl: 6    calcium  carbonate (OS-REGAN) 600 mg calcium (1,500 mg) Tab, Take 600 mg by mouth once., Disp: , Rfl:     diazePAM (VALIUM) 5 MG tablet, Take 1 tablet 1 hour before MRI.  Take 1 tablet 30 minutes before MRI., Disp: 2 tablet, Rfl: 0    diclofenac sodium (VOLTAREN) 1 % Gel, Apply 2 g topically daily as needed. for neck and shoulder pain - Topical, Disp: 100 g, Rfl: 1    estradiol (ESTRACE) 0.5 MG tablet, Take 1 tablet (0.5 mg total) by mouth once daily., Disp: 90 tablet, Rfl: 3    famotidine (PEPCID) 20 MG tablet, Take 20 mg by mouth 2 (two) times daily., Disp: , Rfl:     fish oil-omega-3 fatty acids 300-1,000 mg capsule, Take 2 g by mouth once daily., Disp: , Rfl:     HYDROcodone-acetaminophen (NORCO) 5-325 mg per tablet, Take 1 tablet by mouth every 4 (four) hours as needed for Pain., Disp: 15 tablet, Rfl: 0    lisinopril (PRINIVIL,ZESTRIL) 20 MG tablet, TAKE 1 TABLET BY MOUTH ONCE DAILY, Disp: 90 tablet, Rfl: 0    loratadine (CLARITIN) 10 mg tablet, Take 10 mg by mouth once daily., Disp: , Rfl:     multivitamin (ONE DAILY MULTIVITAMIN) per tablet, Take 1 tablet by mouth once daily., Disp: , Rfl:     triamterene-hydrochlorothiazide 37.5-25 mg (MAXZIDE-25) 37.5-25 mg per tablet, TAKE 1 TABLET BY MOUTH ONCE DAILY, Disp: 90 tablet, Rfl: 0    vitamin D 1000 units Tab, Take 1,000 Units by mouth once daily., Disp: , Rfl:     Review of patient's allergies indicates:   Allergen Reactions    Codeine Nausea And Vomiting          REVIEW OF SYSTEMS:  Constitution: Negative. Negative for chills, fever and night sweats.   HENT: Negative for congestion and headaches.    Eyes: Negative for blurred vision, left vision loss and right vision loss.   Cardiovascular: Negative for chest pain and syncope.   Respiratory: Negative for cough and shortness of breath.    Endocrine: Negative for polydipsia, polyphagia and polyuria.   Hematologic/Lymphatic: Negative for bleeding problem. Does not bruise/bleed easily.   Skin: Negative for dry  skin, itching and rash.   Musculoskeletal: Negative for falls.  Positive for right shoulder pain and muscle weakness.   Gastrointestinal: Negative for abdominal pain and bowel incontinence.   Genitourinary: Negative for bladder incontinence and nocturia.   Neurological: Negative for disturbances in coordination, loss of balance and seizures.   Psychiatric/Behavioral: Negative for depression. The patient does not have insomnia.    Allergic/Immunologic: Negative for hives and persistent infections.      PHYSICAL EXAMINATION:  Vitals:  BP (!) 140/74   Pulse 70   Ht 5' (1.524 m)   Wt 80.7 kg (178 lb)   BMI 34.76 kg/m²    General: The patient is alert and oriented x 3.  Mood is pleasant.  Observation of ears, eyes and nose reveal no gross abnormalities.  No labored breathing observed.  Gait is coordinated. Patient can toe walk and heel walk without difficulty.      RIGHT Shoulder / Upper Extremity Exam    OBSERVATION:     Swelling  none  Deformity  none   Discoloration  none   Scapular winging none   Scars   none  Atrophy  none    TENDERNESS / CREPITUS (T/C):          T/C      T/C   Clavicle   -/-  SUPRAspinatus    -/-     AC Jt.    -/-  INFRAspinatus  -/-    SC Jt.    -/-  Deltoid    -/-      G. Tuberosity  -/-  LH BICEP groove  -/-   Acromion:  -/-  Midline Neck   -/-     Scapular Spine -/-  Trapezium   -/-   SMA Scapula  -/-  GH jt. line - post  -/-     Scapulothoracic  -/-         ROM: (* = with pain)  Left shoulder   Right shoulder        AROM (PROM)   AROM (PROM)   FE    170° (175°)     NT    ER at 0°    60°  (65°)    NT   ER at 90° ABD  90°  (90°)    NT   IR at 90°  ABD   NA  (40°)     NT     IR (spine level)   T10     NT    STRENGTH: (* = with pain) Left shoulder   Right shoulder   SCAPTION   5/5    NT/5    IR    5/5    NT/5   ER    5/5    NT/5   BICEPS   5/5    NT/5   Deltoid    5/5    NT/5      EXTREMITY NEURO-VASCULAR EXAM:    Sensation grossly intact to light touch all dermatomal regions.    DTR 2+  Biceps, Triceps, BR and Negative Allans sign   Grossly intact motor function at Elbow, Wrist and Hand   Distal pulses radial and ulnar 2+, brisk cap refill, symmetric.      NECK:  Painless FROM and spinous processes non-tender. Negative Spurlings sign.      XRAYS:  Xrays 3/28/19 including AP, Outlet and Axillary Lateral of Left shoulder are ordered / images reviewed by me:  FINDINGS:  No fracture dislocation bone destruction seen.  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    MRI right shoulder 3/26/19    FINDINGS:  ROTATOR CUFF: There is a full-thickness, full width tear of the supraspinatus tendon that extends into the infraspinatus tendon and demonstrates a U-shaped morphology with retraction to the level of the glenoid (approximately 3.3 cm) and mild to moderate volume loss and fatty infiltration.  There is subscapularis tendinosis with interstitial and undersurface fraying of the superior fibers.  Teres minor tendon is intact.    LABRUM: There is global abnormal morphology and signal heterogeneity of the glenoid labrum in keeping with degenerative tearing.  No paralabral cyst.    BICEPS: There is a full-thickness tear of the intra-articular biceps tendon with retraction to the level of the proximal humerus.  There is extensive complex fluid within the tendon sheath at the level of the bicipital groove with a 3.1 cm lobulated component extending along the anterolateral aspect of the proximal humeral diaphysis.    BONES: Cortical irregularity and edema noted about the greater tuberosity, likely degenerative in nature.  There is superior subluxation of the humeral head with resulting narrowing of the acromiohumeral interval.  There is a large subacromial spur.    AC JOINT: There is severe AC joint hypertrophy with a small joint effusion.    CARTILAGE: Suspected superficial fissuring overlies noted along the medial humeral head and central glenoid.  No  definite full-thickness cartilage defects.    MISCELLANEOUS: There is a full-thickness tear at the humeral attachment of the anterior band of the inferior glenohumeral ligament with associated displacement.  There is a moderate complex joint effusion with scattered intra-articular loose bodies most notably at the level of the subcoracoid bursa.  There is complex fluid within the subacromial/subdeltoid bursa.  No axillary lymphadenopathy.    Impression:  1. Full-thickness, full width tear of the supraspinatus tendon with extension into the infraspinatus and retraction to the level of the glenoid.  Mild to moderate volume loss and fatty infiltration identified.  2. Global degenerative tearing of the glenoid labrum.  3. Full-thickness tear of the biceps tendon.  Complex tenosynovitis with prominent lobulated component extending along the anterolateral humerus.  4. Full-thickness tear at the humeral attachment of the anterior band of the inferior glenohumeral ligament.  5. Complex joint effusion.  Subcoracoid bursitis with scattered low signal intensity loose bodies.  Subacromial/subdeltoid bursitis.  6. Superior subluxation of the humeral head with narrowing of the acromiohumeral interval.  Prominent subacromial spur.  AC joint hypertrophy.      ASSESSMENT:   Right shoulder pain s/p GH dislocation, large RC tear    PLAN:      1. Remain in sling until surgery to protect against instability  2. PT once weekly with scapular strengthening  3. Treatment options were discussed with the patient about shoulder.  I reviewed the MRI images with her and what this means for her shoulder.    We discussed both non-operative and operative options for her shoulder and the risks and benefits of each. Time was given for questions to be asked and all concerns were answered.    She would like to go forward with surgery for her shoulder which I think is reasonable.  All specific risks and benefits were reviewed.    These risks include but  are not limited to: bleeding, infection, scarring, re-tear of repair, irreparability of the tear, damage to neurovascular structures, damage to cartilage, stiffness, blood clots, pulmonary embolism, swelling, compartment syndrome, need for further surgery, and the risks of anesthesia.      She verbalized her understanding of these risks and wished to proceed with surgery.    Time was spent face-to-face with the patient during this encounter on counseling about treatment options including surgery and coordination of her care for preoperative visits, surgery and post-operative rehab.     The operative plan will be:    right   1. Arthroscopic rotator cuff repair with possible rotation medical patch  2. Arthroscopic subacromial decompression  3. Arthroscopic distal clavicle excision  4. Possible open biceps subpectoral tenodesis  5. Possible loose body removal    Patient will  need medical clearance prior to the pre-operative appointment.    All questions were answered, patient will contact us for questions or concerns in the interim.

## 2019-03-29 ENCOUNTER — TELEPHONE (OUTPATIENT)
Dept: SPORTS MEDICINE | Facility: CLINIC | Age: 69
End: 2019-03-29

## 2019-03-29 ENCOUNTER — PATIENT MESSAGE (OUTPATIENT)
Dept: SURGERY | Facility: OTHER | Age: 69
End: 2019-03-29

## 2019-03-29 DIAGNOSIS — M25.311 SHOULDER INSTABILITY, RIGHT: ICD-10-CM

## 2019-03-29 DIAGNOSIS — M25.511 CHRONIC RIGHT SHOULDER PAIN: Primary | ICD-10-CM

## 2019-03-29 DIAGNOSIS — M75.100 ROTATOR CUFF TEAR: ICD-10-CM

## 2019-03-29 DIAGNOSIS — S46.011A TRAUMATIC TEAR OF RIGHT ROTATOR CUFF, UNSPECIFIED TEAR EXTENT, INITIAL ENCOUNTER: Primary | ICD-10-CM

## 2019-03-29 DIAGNOSIS — S46.211A TEAR OF RIGHT BICEPS MUSCLE, INITIAL ENCOUNTER: ICD-10-CM

## 2019-03-29 DIAGNOSIS — M19.011 ARTHRITIS OF RIGHT ACROMIOCLAVICULAR JOINT: ICD-10-CM

## 2019-03-29 DIAGNOSIS — G89.29 CHRONIC RIGHT SHOULDER PAIN: Primary | ICD-10-CM

## 2019-03-29 DIAGNOSIS — M24.011 LOOSE BODY OF RIGHT SHOULDER: ICD-10-CM

## 2019-03-29 NOTE — TELEPHONE ENCOUNTER
Needs pre-op visit ideally before 4/9  Please contact to schedule for visit with me (regular or resident clinic) or NP in the next week  Should do labs (CBC, BMP, A1c) and EKG prior

## 2019-03-29 NOTE — TELEPHONE ENCOUNTER
----- Message from Marj Laboy MA sent at 3/29/2019  1:32 PM CDT -----  Patient will do PT at [facility]    Pre op - 04/09/19    Date of surgery - 04/22/19

## 2019-03-30 ENCOUNTER — PATIENT MESSAGE (OUTPATIENT)
Dept: INTERNAL MEDICINE | Facility: CLINIC | Age: 69
End: 2019-03-30

## 2019-04-01 ENCOUNTER — CLINICAL SUPPORT (OUTPATIENT)
Dept: REHABILITATION | Facility: HOSPITAL | Age: 69
End: 2019-04-01
Payer: MEDICARE

## 2019-04-01 DIAGNOSIS — R52 PAIN: ICD-10-CM

## 2019-04-01 PROCEDURE — 97110 THERAPEUTIC EXERCISES: CPT | Performed by: PHYSICAL THERAPIST

## 2019-04-01 NOTE — PROGRESS NOTES
"  Physical Therapy Daily Treatment Note     Name: Daisy Severino  Clinic Number: [id number]    Therapy Diagnosis:   Encounter Diagnosis   Name Primary?    Pain      Physician: Jose Nava MD    Visit Date: 4/1/2019  Physician Orders: PT Eval and Treat   Medical Diagnosis: M25.511 (ICD-10-CM) - Acute pain of right shoulder   Evaluation Date: 3/26/2019  Authorization Period Expiration: 3-18-20  Plan of Care Certification Period: 5-07-19  Visit # / Visits authorized: 2/20    Time In: 11:10 am  Time Out: 11:50 am  Total Billable Time: 25 minutes    Precautions: Standard    Subjective     Pt reports: JEFFREY blevins is "sore" after she dislocated again on 3-26-19 while getting out of bed; relocated again in ER.  To have surgery 4-22-19.  She was compliant with home exercise program.  Response to previous treatment: na  Functional change: na    Pain: 0/10  Location: right shoulder      Objective     MRI:  1. Full-thickness, full width tear of the supraspinatus tendon with extension into the infraspinatus and retraction to the level of the glenoid.  Mild to moderate volume loss and fatty infiltration identified.  2. Global degenerative tearing of the glenoid labrum.  3. Full-thickness tear of the biceps tendon.  Complex tenosynovitis with prominent lobulated component extending along the anterolateral humerus.  4. Full-thickness tear at the humeral attachment of the anterior band of the inferior glenohumeral ligament.  5. Complex joint effusion.  Subcoracoid bursitis with scattered low signal intensity loose bodies.  Subacromial/subdeltoid bursitis.  6. Superior subluxation of the humeral head with narrowing of the acromiohumeral interval.  Prominent subacromial spur.  AC joint hypertrophy     To PT in a sling.  Mild swelling.  No TTP.  Joint stiffness with MIN PROM noted.    Treatment:  Shrugs, scap retractions, elb/wrist AROM, manual sub-MAX isometrics and CP x 10 min.       Home Exercises Provided and Patient Education " Provided     Education provided:   - ice for pain; warned against AROM.    Written Home Exercises Provided: Patient instructed to cont prior HEP other than ROM.  Exercises were reviewed and Daisy was able to demonstrate them prior to the end of the session.  Daisy demonstrated good  understanding of the education provided.     See EMR under Patient Instructions for exercises provided prior visit.    Assessment     Tolerated treatment well.  Very hesitant to allow any shld movement.  Daisy is not progressing well towards her goals.   Pt prognosis is Fair.     Pt will continue to benefit from skilled outpatient physical therapy to address the deficits listed in the problem list box on initial evaluation, provide pt/family education and to maximize pt's level of independence in the home and community environment.     Pt's spiritual, cultural and educational needs considered and pt agreeable to plan of care and goals.    Anticipated barriers to physical therapy: 2 dislocations    Goals: Short Term Goals: 2 weeks         1.   Independent with HEP        2.  Pt will report decreased pain level of < 50% from above measure with ADL     Long Term Goals:   GOALS:    6_   weeks. Pt agrees with goals set.  1. Independent with HEP.  2. Report decreased    R shld    pain  <   / =  2/10 with ADL such as housework  3. Increased MMT  for  R shld to 4/5 to 5/5  with ADL such as wok duties  4. Increased arom  for  R shld to WNL with functional activities such walking or self-care         Plan     PT weekly until surgery for ROM and scap stabilization ex.    Prashanth Casiano, PT

## 2019-04-02 ENCOUNTER — OFFICE VISIT (OUTPATIENT)
Dept: INTERNAL MEDICINE | Facility: CLINIC | Age: 69
End: 2019-04-02
Payer: MEDICARE

## 2019-04-02 VITALS
WEIGHT: 192.44 LBS | HEIGHT: 60 IN | BODY MASS INDEX: 37.78 KG/M2 | TEMPERATURE: 98 F | HEART RATE: 88 BPM | SYSTOLIC BLOOD PRESSURE: 118 MMHG | DIASTOLIC BLOOD PRESSURE: 68 MMHG | OXYGEN SATURATION: 97 %

## 2019-04-02 DIAGNOSIS — Z01.818 PREOP TESTING: ICD-10-CM

## 2019-04-02 DIAGNOSIS — M75.101 TEAR OF RIGHT ROTATOR CUFF, UNSPECIFIED TEAR EXTENT: ICD-10-CM

## 2019-04-02 DIAGNOSIS — I10 ESSENTIAL HYPERTENSION: ICD-10-CM

## 2019-04-02 DIAGNOSIS — Z01.810 PREOP CARDIOVASCULAR EXAM: Primary | ICD-10-CM

## 2019-04-02 DIAGNOSIS — E66.01 SEVERE OBESITY (BMI 35.0-39.9) WITH COMORBIDITY: ICD-10-CM

## 2019-04-02 DIAGNOSIS — R73.03 BORDERLINE DIABETES MELLITUS: ICD-10-CM

## 2019-04-02 DIAGNOSIS — R94.31 ABNORMAL FINDING ON EKG: ICD-10-CM

## 2019-04-02 DIAGNOSIS — E78.00 PURE HYPERCHOLESTEROLEMIA: ICD-10-CM

## 2019-04-02 PROCEDURE — 93010 EKG 12-LEAD: ICD-10-PCS | Mod: S$PBB,,, | Performed by: INTERNAL MEDICINE

## 2019-04-02 PROCEDURE — 93005 ELECTROCARDIOGRAM TRACING: CPT | Mod: PBBFAC | Performed by: INTERNAL MEDICINE

## 2019-04-02 PROCEDURE — 99999 PR PBB SHADOW E&M-EST. PATIENT-LVL IV: CPT | Mod: PBBFAC,,, | Performed by: NURSE PRACTITIONER

## 2019-04-02 PROCEDURE — 99213 PR OFFICE/OUTPT VISIT, EST, LEVL III, 20-29 MIN: ICD-10-PCS | Mod: S$PBB,,, | Performed by: NURSE PRACTITIONER

## 2019-04-02 PROCEDURE — 93010 ELECTROCARDIOGRAM REPORT: CPT | Mod: S$PBB,,, | Performed by: INTERNAL MEDICINE

## 2019-04-02 PROCEDURE — 99214 OFFICE O/P EST MOD 30 MIN: CPT | Mod: PBBFAC | Performed by: NURSE PRACTITIONER

## 2019-04-02 PROCEDURE — 99213 OFFICE O/P EST LOW 20 MIN: CPT | Mod: S$PBB,,, | Performed by: NURSE PRACTITIONER

## 2019-04-02 PROCEDURE — 99999 PR PBB SHADOW E&M-EST. PATIENT-LVL IV: ICD-10-PCS | Mod: PBBFAC,,, | Performed by: NURSE PRACTITIONER

## 2019-04-02 RX ORDER — LISINOPRIL 20 MG/1
20 TABLET ORAL DAILY
Qty: 90 TABLET | Refills: 0 | Status: SHIPPED | OUTPATIENT
Start: 2019-04-02 | End: 2019-07-16 | Stop reason: SDUPTHER

## 2019-04-02 NOTE — PROGRESS NOTES
Subjective:       Patient ID: Daisy Severino is a 69 y.o. female.    Chief Complaint: Pre-op Exam    Pt of Dr. Ware, here for preop clearance. Scheduled on 4-9-19 for preop and then on 4-22-19 with Dr. Timmons for right rotator cuff repair with tendon fixation.    Previous issues with anesthesia: States she had a robotic surgery for cancer years ago, she was under for 5 hrs plus, and states she ended up in ICU and had a collapsed lung, needed blood, states it was due to the positioning during surgery and for a long period of time.    Review of Systems   Constitutional: Negative for activity change, appetite change and unexpected weight change.   HENT: Negative for dental problem and hearing loss.    Eyes: Negative for visual disturbance.   Respiratory: Negative for apnea, cough, chest tightness and shortness of breath.    Cardiovascular: Negative for chest pain, palpitations and leg swelling.   Gastrointestinal: Negative for abdominal distention, abdominal pain, anal bleeding, blood in stool, constipation, diarrhea, nausea, rectal pain and vomiting.   Endocrine: Negative for cold intolerance, heat intolerance, polydipsia, polyphagia and polyuria.   Genitourinary: Negative for difficulty urinating.   Musculoskeletal: Positive for arthralgias.        Right shoulder pain--from tear   Skin: Negative for color change.   Allergic/Immunologic: Negative for environmental allergies, food allergies and immunocompromised state.   Neurological: Negative for dizziness, speech difficulty and weakness.   Hematological: Negative for adenopathy. Does not bruise/bleed easily.   Psychiatric/Behavioral: Negative for agitation, behavioral problems, sleep disturbance and suicidal ideas.         Review of patient's allergies indicates:   Allergen Reactions    Codeine Nausea And Vomiting     Current Outpatient Medications:     ACCU-CHEK FASTCLIX LANCET DRUM [facility], USE TO TEST BLOOD GLUCOSE ONCE DAILY, Disp: 102 each, Rfl: 6     ASHWAGANDHA ROOT EXTRACT,BULK, MISC, by Misc.(Non-Drug; Combo Route) route., Disp: , Rfl:     blood sugar diagnostic (ACCU-CHEK FAVIOLA PLUS TEST STRP) Strp, USE ONE STRIP TO CHECK GLUCOSE ONCE DAILY, Disp: 50 strip, Rfl: 6    calcium carbonate (OS-REGAN) 600 mg calcium (1,500 mg) Tab, Take 600 mg by mouth once., Disp: , Rfl:     diazePAM (VALIUM) 5 MG tablet, Take 1 tablet 1 hour before MRI.  Take 1 tablet 30 minutes before MRI., Disp: 2 tablet, Rfl: 0    diclofenac sodium (VOLTAREN) 1 % Gel, Apply 2 g topically daily as needed. for neck and shoulder pain - Topical, Disp: 100 g, Rfl: 1    estradiol (ESTRACE) 0.5 MG tablet, Take 1 tablet (0.5 mg total) by mouth once daily., Disp: 90 tablet, Rfl: 3    famotidine (PEPCID) 20 MG tablet, Take 20 mg by mouth once daily. , Disp: , Rfl:     fish oil-omega-3 fatty acids 300-1,000 mg capsule, Take 2 g by mouth once daily., Disp: , Rfl:     HYDROcodone-acetaminophen (NORCO) 5-325 mg per tablet, Take 1 tablet by mouth every 4 (four) hours as needed for Pain., Disp: 15 tablet, Rfl: 0    lisinopril (PRINIVIL,ZESTRIL) 20 MG tablet, TAKE 1 TABLET BY MOUTH ONCE DAILY, Disp: 90 tablet, Rfl: 0    loratadine (CLARITIN) 10 mg tablet, Take 10 mg by mouth once daily., Disp: , Rfl:     multivitamin (ONE DAILY MULTIVITAMIN) per tablet, Take 1 tablet by mouth once daily., Disp: , Rfl:     triamterene-hydrochlorothiazide 37.5-25 mg (MAXZIDE-25) 37.5-25 mg per tablet, TAKE 1 TABLET BY MOUTH ONCE DAILY, Disp: 90 tablet, Rfl: 0    vitamin D 1000 units Tab, Take 1,000 Units by mouth once daily., Disp: , Rfl:     Patient Active Problem List   Diagnosis    Borderline diabetes mellitus    Hyperlipidemia    Essential hypertension    History of herpes zoster    History of renal carcinoma    Neck and shoulder pain    Fatty liver    Postmenopausal    Severe obesity (BMI 35.0-35.9 with comorbidity)    Osteoarthritis of spine with radiculopathy, cervical region    Cervical radicular  pain    Osteopenia determined by x-ray    Gastroesophageal reflux disease    Left-sided low back pain with left-sided sciatica    Dizziness    Pain    Right rotator cuff tear     Past Medical History:   Diagnosis Date    Abnormal Pap smear of cervix     Cancer of kidney     Hypertension     Renal carcinoma     left kidney stage 1 renal cell carcinoma, s/p L nephrectomy 4/16/2013    Sciatica      Past Surgical History:   Procedure Laterality Date    BREAST LUMPECTOMY  age 16    benign    HYSTERECTOMY  4/1988    partial hysterectomy for heavy bleeding    kidney removal  4/2014    left kidney    NEPHRECTOMY Left      Social History     Socioeconomic History    Marital status:      Spouse name: Not on file    Number of children: Not on file    Years of education: Not on file    Highest education level: Not on file   Occupational History    Not on file   Social Needs    Financial resource strain: Not on file    Food insecurity:     Worry: Not on file     Inability: Not on file    Transportation needs:     Medical: Not on file     Non-medical: Not on file   Tobacco Use    Smoking status: Never Smoker    Smokeless tobacco: Never Used   Substance and Sexual Activity    Alcohol use: Yes     Comment: occasional wine    Drug use: No    Sexual activity: Yes     Partners: Male   Lifestyle    Physical activity:     Days per week: Not on file     Minutes per session: Not on file    Stress: Not on file   Relationships    Social connections:     Talks on phone: Not on file     Gets together: Not on file     Attends Christianity service: Not on file     Active member of club or organization: Not on file     Attends meetings of clubs or organizations: Not on file     Relationship status: Not on file   Other Topics Concern    Not on file   Social History Narrative    Previously ,  passed away. In relationship with boyfriend x 6 years. 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       Family History   Problem Relation Age of Onset    Cancer Father 61        bladder cancer, lung cancer (smoker)    Breast cancer Maternal Aunt     Breast cancer Maternal Aunt     Diabetes Maternal Grandmother     Diabetes Paternal Grandmother     Cancer Paternal Grandfather         head & neck cancer (smoker)         Objective:       Vitals:    04/02/19 1446   BP: 118/68   Pulse: 88   Temp: 98.2 °F (36.8 °C)   SpO2: 97%   Weight: 87.3 kg (192 lb 7.4 oz)   Height: 5' (1.524 m)   PainSc: 0-No pain       Body mass index is 37.59 kg/m².    Physical Exam   Constitutional: She is oriented to person, place, and time. She appears well-developed and well-nourished.   obese   HENT:   Head: Normocephalic.   Right Ear: External ear normal.   Left Ear: External ear normal.   Nose: Nose normal.   Mouth/Throat: Oropharynx is clear and moist.   Eyes: Pupils are equal, round, and reactive to light. Conjunctivae and EOM are normal.   Neck: Normal range of motion. Neck supple.   Cardiovascular: Normal rate, regular rhythm, normal heart sounds and intact distal pulses.   Pulmonary/Chest: Effort normal and breath sounds normal.   Abdominal: Soft. Bowel sounds are normal.   obese   Musculoskeletal: She exhibits tenderness.   Right arm in a sling    Decreased ROM due to pain   Neurological: She is alert and oriented to person, place, and time.   Skin: Skin is warm and dry. Capillary refill takes less than 2 seconds.   Psychiatric: She has a normal mood and affect. Her behavior is normal. Judgment and thought content normal.   Nursing note and vitals reviewed.        Surgical Risk Assessment      Active cardiac issues:  Active decompensated heart failure? no   Unstable angina?  no   Significant uncontrolled arrhythmias? no   Severe valvular heart disease-Aortic or Mitral Stenosis? no   Recent MI or coronary revascularization < 30 days? no      Cardiac Risk Factors  High risk surgery? no   History of CAD/ischemic heart disease? no    History of cerebrovascular disease? no   History of compensated heart failure? no   Type 2 diabetes requiring insulin? no   Serum Creatinine > 2? No--will check today   Total cardiac risk factors 0      Functional mets      < 4 METs -unable to walk > 2 blocks on level ground without stopping due to symptoms  - eating, dressing, toileting, walking indoors, light housework. POOR   > 4 METs -climbing > 1 flight of stairs without stopping  -walking up hill > 1-2 blocks  -scrubbing floors  -moving furniture  - golf, bowling, dancing or tennis  -running short distance MODERATE to EXCELL       Assessment:       1. Preop cardiovascular exam    2. Tear of right rotator cuff, unspecified tear extent    3. Preop testing    4. Essential hypertension    5. Pure hypercholesterolemia    6. Borderline diabetes mellitus    7. BMI 37.0-37.9, adult    8. Severe obesity (BMI 35.0-39.9) with comorbidity    9. Abnormal finding on EKG        Plan:       Daisy was seen today for pre-op exam.    Diagnoses and all orders for this visit:    Preop cardiovascular exam  -     EKG 12-lead    Needs to be seen by Cardiology for clearance prior to operation    Tear of right rotator cuff, unspecified tear extent  4/9/19 preop scheduled and surgery with Dr. Timmons on 4-22-19    Preop testing  -     EKG 12-lead    Essential hypertension  Stable, followed by PCP    Take medications as prescribed.    Monitor BP at home, goal BP < or = 140/80, call office if consistently above this range.    Follow low salt DASH diet and exercise.    BMI reviewed.    Go to ED if Headaches, blurred vision, chest pain, or SOB occurs along with elevated readings > or = 160/90.    Pure hypercholesterolemia  Last levels stable    Borderline diabetes mellitus  Check A1C today    BMI 37.0-37.9, adult  BMI reviewed    Severe obesity (BMI 35.0-39.9) with comorbidity  BMI reviewed.    Diet and exercise to lose weight.    Abnormal finding on EKG  -     Ambulatory Referral to  Cardiology    Will check preop labs today    Refer to Cardiology for further eval of abnormal EKG pre op clearance     Pt needs a medical release form signed to send her MRI films and records to Dr. Joss Steeel    Follow up if symptoms worsen or fail to improve.

## 2019-04-02 NOTE — PATIENT INSTRUCTIONS
Will check preop labs today    Refer to Cardiology for further eval of abnormal EKG pre op clearance     Pt needs a medical release form signed to send her MRI films and records to Dr. Joss Steele

## 2019-04-03 ENCOUNTER — PATIENT MESSAGE (OUTPATIENT)
Dept: INTERNAL MEDICINE | Facility: CLINIC | Age: 69
End: 2019-04-03

## 2019-04-03 ENCOUNTER — TELEPHONE (OUTPATIENT)
Dept: SPORTS MEDICINE | Facility: CLINIC | Age: 69
End: 2019-04-03

## 2019-04-03 NOTE — TELEPHONE ENCOUNTER
LVM for patient to see if she has scheduled an apt with Cardiology. Let her know that we will need clearance from both her PCP and cardiologist prior to pre-op apt here or we will need to reschedule it. Waiting for patient to call me back.

## 2019-04-05 ENCOUNTER — PATIENT MESSAGE (OUTPATIENT)
Dept: INTERNAL MEDICINE | Facility: CLINIC | Age: 69
End: 2019-04-05

## 2019-04-05 ENCOUNTER — PATIENT MESSAGE (OUTPATIENT)
Dept: SURGERY | Facility: OTHER | Age: 69
End: 2019-04-05

## 2019-04-05 DIAGNOSIS — I10 ESSENTIAL HYPERTENSION: ICD-10-CM

## 2019-04-05 RX ORDER — TRIAMTERENE/HYDROCHLOROTHIAZID 37.5-25 MG
1 TABLET ORAL DAILY
Qty: 90 TABLET | Refills: 0 | Status: SHIPPED | OUTPATIENT
Start: 2019-04-05 | End: 2019-07-16 | Stop reason: SDUPTHER

## 2019-04-08 ENCOUNTER — LAB VISIT (OUTPATIENT)
Dept: LAB | Facility: HOSPITAL | Age: 69
End: 2019-04-08
Attending: INTERNAL MEDICINE
Payer: MEDICARE

## 2019-04-08 DIAGNOSIS — Z01.818 PRE-OP EVALUATION: ICD-10-CM

## 2019-04-08 DIAGNOSIS — Z85.528 HISTORY OF RENAL CARCINOMA: ICD-10-CM

## 2019-04-08 DIAGNOSIS — I10 ESSENTIAL HYPERTENSION: ICD-10-CM

## 2019-04-08 DIAGNOSIS — R73.03 BORDERLINE DIABETES MELLITUS: ICD-10-CM

## 2019-04-08 LAB
ANION GAP SERPL CALC-SCNC: 8 MMOL/L (ref 8–16)
BUN SERPL-MCNC: 25 MG/DL (ref 8–23)
CALCIUM SERPL-MCNC: 10.3 MG/DL (ref 8.7–10.5)
CHLORIDE SERPL-SCNC: 104 MMOL/L (ref 95–110)
CO2 SERPL-SCNC: 26 MMOL/L (ref 23–29)
CREAT SERPL-MCNC: 1.1 MG/DL (ref 0.5–1.4)
ERYTHROCYTE [DISTWIDTH] IN BLOOD BY AUTOMATED COUNT: 13.5 % (ref 11.5–14.5)
EST. GFR  (AFRICAN AMERICAN): >60 ML/MIN/1.73 M^2
EST. GFR  (NON AFRICAN AMERICAN): 52 ML/MIN/1.73 M^2
ESTIMATED AVG GLUCOSE: 120 MG/DL (ref 68–131)
GLUCOSE SERPL-MCNC: 97 MG/DL (ref 70–110)
HBA1C MFR BLD HPLC: 5.8 % (ref 4–5.6)
HCT VFR BLD AUTO: 43.9 % (ref 37–48.5)
HGB BLD-MCNC: 13.6 G/DL (ref 12–16)
MCH RBC QN AUTO: 28.2 PG (ref 27–31)
MCHC RBC AUTO-ENTMCNC: 31 G/DL (ref 32–36)
MCV RBC AUTO: 91 FL (ref 82–98)
PLATELET # BLD AUTO: 278 K/UL (ref 150–350)
PMV BLD AUTO: 9.8 FL (ref 9.2–12.9)
POTASSIUM SERPL-SCNC: 4.4 MMOL/L (ref 3.5–5.1)
RBC # BLD AUTO: 4.83 M/UL (ref 4–5.4)
SODIUM SERPL-SCNC: 138 MMOL/L (ref 136–145)
WBC # BLD AUTO: 8.04 K/UL (ref 3.9–12.7)

## 2019-04-08 PROCEDURE — 36415 COLL VENOUS BLD VENIPUNCTURE: CPT

## 2019-04-08 PROCEDURE — 85027 COMPLETE CBC AUTOMATED: CPT

## 2019-04-08 PROCEDURE — 80048 BASIC METABOLIC PNL TOTAL CA: CPT

## 2019-04-08 PROCEDURE — 83036 HEMOGLOBIN GLYCOSYLATED A1C: CPT

## 2019-04-22 ENCOUNTER — PATIENT MESSAGE (OUTPATIENT)
Dept: INTERNAL MEDICINE | Facility: CLINIC | Age: 69
End: 2019-04-22

## 2019-05-15 ENCOUNTER — PES CALL (OUTPATIENT)
Dept: ADMINISTRATIVE | Facility: CLINIC | Age: 69
End: 2019-05-15

## 2019-05-22 ENCOUNTER — PATIENT MESSAGE (OUTPATIENT)
Dept: INTERNAL MEDICINE | Facility: CLINIC | Age: 69
End: 2019-05-22

## 2019-05-22 NOTE — TELEPHONE ENCOUNTER
On reviewing, not clear if copies of the letters she requested were printed or whether she was contacted for pickup.  Have re-printed the letters and sent message to patient via portal.   Please check in with her to see if she still needs these.    Hammad Ware MD

## 2019-05-30 ENCOUNTER — PATIENT MESSAGE (OUTPATIENT)
Dept: INTERNAL MEDICINE | Facility: CLINIC | Age: 69
End: 2019-05-30

## 2019-07-16 DIAGNOSIS — I10 ESSENTIAL HYPERTENSION: ICD-10-CM

## 2019-07-16 RX ORDER — LISINOPRIL 20 MG/1
TABLET ORAL
Qty: 90 TABLET | Refills: 0 | Status: SHIPPED | OUTPATIENT
Start: 2019-07-16 | End: 2019-10-22 | Stop reason: ALTCHOICE

## 2019-07-17 ENCOUNTER — PATIENT MESSAGE (OUTPATIENT)
Dept: INTERNAL MEDICINE | Facility: CLINIC | Age: 69
End: 2019-07-17

## 2019-07-17 RX ORDER — TRIAMTERENE/HYDROCHLOROTHIAZID 37.5-25 MG
1 TABLET ORAL DAILY
Qty: 90 TABLET | Refills: 0 | Status: SHIPPED | OUTPATIENT
Start: 2019-07-17 | End: 2019-10-22 | Stop reason: SDUPTHER

## 2019-10-01 ENCOUNTER — PATIENT MESSAGE (OUTPATIENT)
Dept: INTERNAL MEDICINE | Facility: CLINIC | Age: 69
End: 2019-10-01

## 2019-10-01 DIAGNOSIS — Z12.39 SCREENING FOR MALIGNANT NEOPLASM OF BREAST: ICD-10-CM

## 2019-10-01 DIAGNOSIS — R73.03 BORDERLINE DIABETES MELLITUS: ICD-10-CM

## 2019-10-01 DIAGNOSIS — I10 ESSENTIAL HYPERTENSION: ICD-10-CM

## 2019-10-01 DIAGNOSIS — Z12.11 SCREENING FOR MALIGNANT NEOPLASM OF COLON: Primary | ICD-10-CM

## 2019-10-01 DIAGNOSIS — E78.2 MIXED HYPERLIPIDEMIA: ICD-10-CM

## 2019-10-02 NOTE — TELEPHONE ENCOUNTER
"Last labs done 4/2019 were overall stable.  Due for BMP, lipid panel, A1c  Due for FIT / colon cancer screening  Due for mammogram    Please schedule for earlier annual exam - ok to override any "new" appt spot in the next 2 months.  Please schedule for fasting labs and mammogram prior, review use of FIT kit for colon cancer screening.    "

## 2019-10-03 ENCOUNTER — TELEPHONE (OUTPATIENT)
Dept: INTERNAL MEDICINE | Facility: CLINIC | Age: 69
End: 2019-10-03

## 2019-10-04 ENCOUNTER — PATIENT MESSAGE (OUTPATIENT)
Dept: INTERNAL MEDICINE | Facility: CLINIC | Age: 69
End: 2019-10-04

## 2019-10-04 ENCOUNTER — PATIENT MESSAGE (OUTPATIENT)
Dept: ADMINISTRATIVE | Facility: OTHER | Age: 69
End: 2019-10-04

## 2019-10-08 ENCOUNTER — PATIENT MESSAGE (OUTPATIENT)
Dept: INTERNAL MEDICINE | Facility: CLINIC | Age: 69
End: 2019-10-08

## 2019-10-14 ENCOUNTER — PATIENT MESSAGE (OUTPATIENT)
Dept: INTERNAL MEDICINE | Facility: CLINIC | Age: 69
End: 2019-10-14

## 2019-10-22 ENCOUNTER — OFFICE VISIT (OUTPATIENT)
Dept: INTERNAL MEDICINE | Facility: CLINIC | Age: 69
End: 2019-10-22
Payer: MEDICARE

## 2019-10-22 VITALS
TEMPERATURE: 98 F | WEIGHT: 185.19 LBS | HEIGHT: 60 IN | BODY MASS INDEX: 36.36 KG/M2 | SYSTOLIC BLOOD PRESSURE: 128 MMHG | OXYGEN SATURATION: 96 % | DIASTOLIC BLOOD PRESSURE: 78 MMHG | HEART RATE: 62 BPM

## 2019-10-22 DIAGNOSIS — M54.42 CHRONIC LEFT-SIDED LOW BACK PAIN WITH LEFT-SIDED SCIATICA: ICD-10-CM

## 2019-10-22 DIAGNOSIS — K76.0 FATTY LIVER: ICD-10-CM

## 2019-10-22 DIAGNOSIS — R73.03 BORDERLINE DIABETES MELLITUS: ICD-10-CM

## 2019-10-22 DIAGNOSIS — Z85.528 HISTORY OF RENAL CARCINOMA: ICD-10-CM

## 2019-10-22 DIAGNOSIS — I10 ESSENTIAL HYPERTENSION: ICD-10-CM

## 2019-10-22 DIAGNOSIS — M25.519 NECK AND SHOULDER PAIN: ICD-10-CM

## 2019-10-22 DIAGNOSIS — G89.29 CHRONIC LEFT-SIDED LOW BACK PAIN WITH LEFT-SIDED SCIATICA: ICD-10-CM

## 2019-10-22 DIAGNOSIS — E78.2 MIXED HYPERLIPIDEMIA: ICD-10-CM

## 2019-10-22 DIAGNOSIS — M54.2 NECK AND SHOULDER PAIN: ICD-10-CM

## 2019-10-22 DIAGNOSIS — K21.9 GASTROESOPHAGEAL REFLUX DISEASE, ESOPHAGITIS PRESENCE NOT SPECIFIED: ICD-10-CM

## 2019-10-22 DIAGNOSIS — Z00.00 ANNUAL PHYSICAL EXAM: Primary | ICD-10-CM

## 2019-10-22 DIAGNOSIS — M75.101 TEAR OF RIGHT ROTATOR CUFF, UNSPECIFIED TEAR EXTENT, UNSPECIFIED WHETHER TRAUMATIC: ICD-10-CM

## 2019-10-22 PROCEDURE — 99214 PR OFFICE/OUTPT VISIT, EST, LEVL IV, 30-39 MIN: ICD-10-PCS | Mod: S$PBB,,, | Performed by: INTERNAL MEDICINE

## 2019-10-22 PROCEDURE — 99214 OFFICE O/P EST MOD 30 MIN: CPT | Mod: S$PBB,,, | Performed by: INTERNAL MEDICINE

## 2019-10-22 PROCEDURE — 99999 PR PBB SHADOW E&M-EST. PATIENT-LVL IV: CPT | Mod: PBBFAC,,, | Performed by: INTERNAL MEDICINE

## 2019-10-22 PROCEDURE — 99999 PR PBB SHADOW E&M-EST. PATIENT-LVL IV: ICD-10-PCS | Mod: PBBFAC,,, | Performed by: INTERNAL MEDICINE

## 2019-10-22 PROCEDURE — 99214 OFFICE O/P EST MOD 30 MIN: CPT | Mod: PBBFAC | Performed by: INTERNAL MEDICINE

## 2019-10-22 RX ORDER — MUPIROCIN 20 MG/G
OINTMENT TOPICAL 2 TIMES DAILY
Qty: 22 G | Refills: 1 | Status: SHIPPED | OUTPATIENT
Start: 2019-10-22

## 2019-10-22 RX ORDER — TRIAMTERENE/HYDROCHLOROTHIAZID 37.5-25 MG
1 TABLET ORAL DAILY
Qty: 90 TABLET | Refills: 1 | Status: SHIPPED | OUTPATIENT
Start: 2019-10-22 | End: 2020-01-17 | Stop reason: SDUPTHER

## 2019-10-22 RX ORDER — LOSARTAN POTASSIUM 50 MG/1
50 TABLET ORAL DAILY
Qty: 90 TABLET | Refills: 3 | Status: SHIPPED | OUTPATIENT
Start: 2019-10-22 | End: 2020-01-17 | Stop reason: SDUPTHER

## 2019-10-22 RX ORDER — TRIAMTERENE/HYDROCHLOROTHIAZID 37.5-25 MG
1 TABLET ORAL DAILY
Qty: 90 TABLET | Refills: 1 | Status: CANCELLED | OUTPATIENT
Start: 2019-10-22 | End: 2020-10-21

## 2019-10-22 NOTE — LETTER
October 22, 2019    Daisy CHARLES [address] 1972  [place] [address]             [facility] - Internal Medicine  [address] [place] [address]  Phone: [phone]  Fax: [phone] To whom it may concern:    Ms Daisy Severino follows with me for primary care at the [facility]. I have referred her to a medical fitness program to address hypertension, hyperlipidemia, and borderline diabetes, and she is doing well with this and has seen significant improvements.  I do feel that it is contributing to improvements in her health and that she should continue with the gym membership including regular exercise and instruction.      Please feel free to contact me if any questions.  Best,        Hammad Ware MD

## 2019-10-22 NOTE — PATIENT INSTRUCTIONS
Remember to call to follow up with Dr Ramirez!    There is a new shingles vaccine available (Shingrix) that is both safer and more effective than the old shingles vaccine (Zostavax). I do recommend that you get this, as it can help prevent a shingles outbreak even if you have had one in the past.   For this vaccine, you will need a series of 2 shots, first one and then a second 2 to 6 months later.  Please check in with the [facility] or with your local pharmacy about getting this vaccine; they should be able to check on insurance coverage and whether there is any cost to you.

## 2019-10-22 NOTE — PROGRESS NOTES
Subjective:       Patient ID: Daisy Severino is a 69 y.o. female.    Chief Complaint: Annual Exam    HPI  70 y/o woman with HTN, HLD, borderline DM, fatty liver, GERD, back pain, h/o renal carcinoma here for annual exam.  No acute concerns    HTN - taking lisinopril 20mg, triamterene-HCTZ 37.5-35mg.  Very active at work (works at [employer] [address]); less regular at the gym  Keeps well-hydrated    H/o HLD -Taking fish oil / omega 3 supplement.    Borderline DM - 5.8 on last check 4/2019  Not on any medications  Watching diet, generally doing well with this.    H/o GERD - takes famotidine in the AM but no issues generally.    Neck/shoulder pain - saw Sports Med here then followed with outside Ortho for this (Dr Smith, Dr Steele). R shoulder dislocation twice this spring in 3/2019, possible nondisplaced acromial fracture. +rotator cuff tear (supraspinatus, biceps, glenohumeral ligament), +arthritis of joint. Canceled surgery due to having to move suddenly.  Not currently having pain, avoids exacerbating activities. Careful with position while sleeping.  Following with Dr Smith.     Back pain, sciatica - doing well with this currently. Does stretches before getting out of bed in the morning    Chronic allergies, h/o vertigo - referred to ENT & allergy, has taken valium + meclizine in the past for this    H/o zoster in L eye previously, has also had this in her ear years ago.    Follows with eye doctor at [facility], has dry eyes, uses restasis.    H/o renal carcinoma - diagnosed with ultrasound and follow-up CT, stage I carcinoma. Had complete nephrectomy 4/2013 with Dr Stepan Ramirez; surgery and post-op course complicated with ICU stay, collapsed lung.  Saw Dr Ramirez 9/2017, had ultrasound repeated which showed possible liver lesion but was otherwise unremarkable; had MRI done which showed small cavernous hemangioma.   Last saw Dr Ramirez 10/2018, UA normal, planned for follow up at 1 year    Mammogram due  Colon cancer  screening - FIT done 2017; due for this  DEXA 5/2017 showed osteopenia; no indication for treatment with prescription medications. Takes vitamin D daily, takes calcium.      Review of Systems   Constitutional: Negative for activity change and unexpected weight change.   HENT: Negative for hearing loss, rhinorrhea and trouble swallowing.    Eyes: Negative for discharge and visual disturbance.   Respiratory: Negative for cough, chest tightness, shortness of breath and wheezing.    Cardiovascular: Negative for chest pain, palpitations and leg swelling.   Gastrointestinal: Negative for abdominal pain, blood in stool, constipation, diarrhea and vomiting.   Endocrine: Negative for polydipsia and polyuria.   Genitourinary: Negative for difficulty urinating, dysuria, hematuria and menstrual problem.   Musculoskeletal: Positive for arthralgias. Negative for joint swelling and neck pain. Back pain: improved.   Allergic/Immunologic: Positive for environmental allergies.   Neurological: Negative for weakness and headaches.   Psychiatric/Behavioral: Negative for confusion and dysphoric mood.         Past medical history, surgical history, and family medical history reviewed and updated as appropriate.    Medications and allergies reviewed.     Objective:          Vitals:    10/22/19 1014   BP: 128/78   BP Location: Left arm   Patient Position: Sitting   BP Method: Medium (Manual)   Pulse: 62   Temp: 97.7 °F (36.5 °C)   SpO2: 96%   Weight: 84 kg (185 lb 3 oz)   Height: 5' (1.524 m)     Body mass index is 36.17 kg/m².  Physical Exam   Constitutional: She is oriented to person, place, and time. She appears well-developed and well-nourished. No distress.   Comfortable and conversant   HENT:   Head: Normocephalic and atraumatic.   Nose: No mucosal edema.   Mouth/Throat: Oropharynx is clear and moist. No posterior oropharyngeal erythema.   Eyes: Pupils are equal, round, and reactive to light. Conjunctivae and EOM are normal. No  scleral icterus.   Neck: Normal range of motion. Neck supple. No thyromegaly present.   Cardiovascular: Normal rate, regular rhythm and normal heart sounds.   No murmur heard.  Pulmonary/Chest: Effort normal and breath sounds normal. No respiratory distress.   Abdominal: Soft. Bowel sounds are normal. She exhibits no distension. There is no tenderness.   Musculoskeletal: She exhibits no edema or tenderness.   Lymphadenopathy:     She has no cervical adenopathy.   Neurological: She is alert and oriented to person, place, and time. She has normal strength and normal reflexes. No cranial nerve deficit or sensory deficit. Gait normal.   Skin: Skin is warm and dry. No rash noted. She is not diaphoretic.   Psychiatric: She has a normal mood and affect.   Vitals reviewed.      Lab Results   Component Value Date    WBC 8.04 04/08/2019    HGB 13.6 04/08/2019    HCT 43.9 04/08/2019     04/08/2019    CHOL 192 07/19/2018    TRIG 148 07/19/2018    HDL 51 07/19/2018    ALT 18 07/19/2018    AST 18 07/19/2018     04/08/2019    K 4.4 04/08/2019     04/08/2019    CREATININE 1.1 04/08/2019    BUN 25 (H) 04/08/2019    CO2 26 04/08/2019    HGBA1C 5.8 (H) 04/08/2019       Assessment:       1. Annual physical exam    2. Essential hypertension    3. Essential hypertension    4. Mixed hyperlipidemia    5. Borderline diabetes mellitus    6. Fatty liver    7. Gastroesophageal reflux disease, esophagitis presence not specified    8. Tear of right rotator cuff, unspecified tear extent, unspecified whether traumatic    9. Neck and shoulder pain    10. Chronic left-sided low back pain with left-sided sciatica    11. History of renal carcinoma        Plan:   Daisy was seen today for annual exam.    Diagnoses and all orders for this visit:    Annual physical exam - Overall healthy, doing well. Reviewed chronic and preventive health concerns.  -     mupirocin (BACTROBAN) 2 % ointment; Apply topically 2 (two) times daily. To area  on ear    Essential hypertension - discussed recent study re: ACEi risk including limitations of study; she would like to switch to ARB.   Monitor BP more closely after switching  -     losartan (COZAAR) 50 MG tablet; Take 1 tablet (50 mg total) by mouth once daily.  -     triamterene-hydrochlorothiazide 37.5-25 mg (MAXZIDE-25) 37.5-25 mg per tablet; Take 1 tablet by mouth once daily.    Mixed hyperlipidemia - with lipid panel from last labs would meet criteria for benefit from statin; rechecking with upcoming labs  -     Comprehensive metabolic panel; Future    Borderline diabetes mellitus - continue working on healthy diet, weight loss  -     Comprehensive metabolic panel; Future    Fatty liver - as above; avoid excessive alcohol  -     Comprehensive metabolic panel; Future    Gastroesophageal reflux disease, esophagitis presence not specified - doing well on famotidine, continue    Tear of right rotator cuff, unspecified tear extent, unspecified whether traumatic  Neck and shoulder pain  Doing well currently but when able should follow up for re-evaluation with Ortho    Chronic left-sided low back pain with left-sided sciatica - doing well, continue home stretches/exercises    History of renal carcinoma - follow up with urology   -     Comprehensive metabolic panel; Future    Other orders  -     Cancel: triamterene-hydrochlorothiazide 37.5-25 mg (MAXZIDE-25) 37.5-25 mg per tablet; Take 1 tablet by mouth once daily.    Health maintenance reviewed with patient.   Reminded to do FIT, mammogram  Rx given for shingrix, recommended she consider    Follow up in about 6 months (around 4/22/2020) for hypertension.    Hammad Ware MD  Internal Medicine  [facility]  10/22/2019

## 2019-10-24 ENCOUNTER — HOSPITAL ENCOUNTER (OUTPATIENT)
Dept: RADIOLOGY | Facility: HOSPITAL | Age: 69
Discharge: HOME | End: 2019-10-24
Attending: INTERNAL MEDICINE
Payer: MEDICARE

## 2019-10-24 DIAGNOSIS — Z12.39 SCREENING FOR MALIGNANT NEOPLASM OF BREAST: ICD-10-CM

## 2019-10-24 PROCEDURE — 77063 MAMMO DIGITAL SCREENING BILAT WITH TOMOSYNTHESIS_CAD: ICD-10-PCS | Mod: 26,,, | Performed by: RADIOLOGY

## 2019-10-24 PROCEDURE — 77067 SCR MAMMO BI INCL CAD: CPT | Mod: TC

## 2019-10-24 PROCEDURE — 77067 MAMMO DIGITAL SCREENING BILAT WITH TOMOSYNTHESIS_CAD: ICD-10-PCS | Mod: 26,,, | Performed by: RADIOLOGY

## 2019-10-24 PROCEDURE — 77067 SCR MAMMO BI INCL CAD: CPT | Mod: 26,,, | Performed by: RADIOLOGY

## 2019-10-24 PROCEDURE — 77063 BREAST TOMOSYNTHESIS BI: CPT | Mod: 26,,, | Performed by: RADIOLOGY

## 2019-10-25 ENCOUNTER — LAB VISIT (OUTPATIENT)
Dept: LAB | Facility: HOSPITAL | Age: 69
End: 2019-10-25
Attending: INTERNAL MEDICINE
Payer: MEDICARE

## 2019-10-25 DIAGNOSIS — R73.03 BORDERLINE DIABETES MELLITUS: ICD-10-CM

## 2019-10-25 DIAGNOSIS — K76.0 FATTY LIVER: ICD-10-CM

## 2019-10-25 DIAGNOSIS — E78.2 MIXED HYPERLIPIDEMIA: Primary | ICD-10-CM

## 2019-10-25 DIAGNOSIS — I10 ESSENTIAL HYPERTENSION: ICD-10-CM

## 2019-10-25 DIAGNOSIS — E78.2 MIXED HYPERLIPIDEMIA: ICD-10-CM

## 2019-10-25 LAB
ALBUMIN SERPL BCP-MCNC: 3.8 G/DL (ref 3.5–5.2)
ALP SERPL-CCNC: 79 U/L (ref 55–135)
ALT SERPL W/O P-5'-P-CCNC: 19 U/L (ref 10–44)
ANION GAP SERPL CALC-SCNC: 10 MMOL/L (ref 8–16)
AST SERPL-CCNC: 21 U/L (ref 10–40)
BILIRUB SERPL-MCNC: 0.5 MG/DL (ref 0.1–1)
BUN SERPL-MCNC: 32 MG/DL (ref 8–23)
CALCIUM SERPL-MCNC: 9.5 MG/DL (ref 8.7–10.5)
CHLORIDE SERPL-SCNC: 107 MMOL/L (ref 95–110)
CHOLEST SERPL-MCNC: 184 MG/DL (ref 120–199)
CHOLEST/HDLC SERPL: 3.3 {RATIO} (ref 2–5)
CO2 SERPL-SCNC: 24 MMOL/L (ref 23–29)
CREAT SERPL-MCNC: 1.1 MG/DL (ref 0.5–1.4)
EST. GFR  (AFRICAN AMERICAN): >60 ML/MIN/1.73 M^2
EST. GFR  (NON AFRICAN AMERICAN): 52 ML/MIN/1.73 M^2
ESTIMATED AVG GLUCOSE: 126 MG/DL (ref 68–131)
GLUCOSE SERPL-MCNC: 106 MG/DL (ref 70–110)
HBA1C MFR BLD HPLC: 6 % (ref 4–5.6)
HDLC SERPL-MCNC: 55 MG/DL (ref 40–75)
HDLC SERPL: 29.9 % (ref 20–50)
LDLC SERPL CALC-MCNC: 111.4 MG/DL (ref 63–159)
NONHDLC SERPL-MCNC: 129 MG/DL
POTASSIUM SERPL-SCNC: 4.4 MMOL/L (ref 3.5–5.1)
PROT SERPL-MCNC: 7 G/DL (ref 6–8.4)
SODIUM SERPL-SCNC: 141 MMOL/L (ref 136–145)
TRIGL SERPL-MCNC: 88 MG/DL (ref 30–150)

## 2019-10-25 PROCEDURE — 83036 HEMOGLOBIN GLYCOSYLATED A1C: CPT

## 2019-10-25 PROCEDURE — 80061 LIPID PANEL: CPT

## 2019-10-25 PROCEDURE — 36415 COLL VENOUS BLD VENIPUNCTURE: CPT

## 2019-10-25 PROCEDURE — 80053 COMPREHEN METABOLIC PANEL: CPT

## 2019-11-14 RX ORDER — ESTRADIOL 0.5 MG/1
0.5 TABLET ORAL DAILY
Qty: 90 TABLET | Refills: 3 | Status: SHIPPED | OUTPATIENT
Start: 2019-11-14 | End: 2020-12-31 | Stop reason: SDUPTHER

## 2019-11-14 RX ORDER — ESTRADIOL 0.5 MG/1
TABLET ORAL
Qty: 90 TABLET | Refills: 3 | Status: SHIPPED | OUTPATIENT
Start: 2019-11-14 | End: 2019-11-14 | Stop reason: SDUPTHER

## 2019-11-14 NOTE — TELEPHONE ENCOUNTER
----- Message from Isidra Ferrari sent at 11/14/2019 10:33 AM CST -----  Contact: Pt  Pt is requesting a callback from office need to get a new prescription for her medication says she already called [facility] just need to get the medication sent over     estradiol (ESTRACE) 0.5 MG tablet

## 2019-12-11 NOTE — TELEPHONE ENCOUNTER
..Called patient left message, intro self, referred by Dr. Ware.   Request call back at direct number [phone].  Daisy Pittman RN HC     Health Maintenance Due   Topic Date Due   • DTaP/Tdap/Td Vaccine (1 - Tdap) 02/24/1961   • Lung Cancer Screening  02/24/1999   • Shingles Vaccine (2 of 3) 02/26/2015   • Medicare Wellness 65+  03/03/2018   • Influenza Vaccine (1) 09/01/2019       Patient is due for topics as listed above but is not proceeding with Immunization(s) Dtap/Tdap/Td, Influenza and Shingles at this time. Discuss above with provider.

## 2020-01-08 ENCOUNTER — ANESTHESIA (OUTPATIENT)
Dept: RADIOLOGY | Facility: HOSPITAL | Age: 70
End: 2020-01-08
Payer: MEDICARE

## 2020-01-08 ENCOUNTER — HOSPITAL ENCOUNTER (EMERGENCY)
Facility: HOSPITAL | Age: 70
Discharge: HOME | End: 2020-01-08
Attending: EMERGENCY MEDICINE
Payer: MEDICARE

## 2020-01-08 ENCOUNTER — ANESTHESIA EVENT (OUTPATIENT)
Dept: RADIOLOGY | Facility: HOSPITAL | Age: 70
End: 2020-01-08
Payer: MEDICARE

## 2020-01-08 VITALS
SYSTOLIC BLOOD PRESSURE: 130 MMHG | BODY MASS INDEX: 35.34 KG/M2 | TEMPERATURE: 99 F | WEIGHT: 180 LBS | HEART RATE: 58 BPM | RESPIRATION RATE: 18 BRPM | DIASTOLIC BLOOD PRESSURE: 67 MMHG | OXYGEN SATURATION: 99 % | HEIGHT: 60 IN

## 2020-01-08 DIAGNOSIS — M24.411 SHOULDER DISLOCATION, RECURRENT, RIGHT: ICD-10-CM

## 2020-01-08 DIAGNOSIS — M25.511 RIGHT SHOULDER PAIN: ICD-10-CM

## 2020-01-08 PROCEDURE — 96374 THER/PROPH/DIAG INJ IV PUSH: CPT

## 2020-01-08 PROCEDURE — 37000008 HC ANESTHESIA 1ST 15 MINUTES

## 2020-01-08 PROCEDURE — D9220A PRA ANESTHESIA: Mod: CRNA,,, | Performed by: NURSE ANESTHETIST, CERTIFIED REGISTERED

## 2020-01-08 PROCEDURE — D9220A PRA ANESTHESIA: ICD-10-PCS | Mod: CRNA,,, | Performed by: NURSE ANESTHETIST, CERTIFIED REGISTERED

## 2020-01-08 PROCEDURE — 63600175 PHARM REV CODE 636 W HCPCS: Performed by: EMERGENCY MEDICINE

## 2020-01-08 PROCEDURE — 96375 TX/PRO/DX INJ NEW DRUG ADDON: CPT | Mod: 59

## 2020-01-08 PROCEDURE — D9220A PRA ANESTHESIA: Mod: ANES,,, | Performed by: ANESTHESIOLOGY

## 2020-01-08 PROCEDURE — 63600175 PHARM REV CODE 636 W HCPCS: Performed by: NURSE ANESTHETIST, CERTIFIED REGISTERED

## 2020-01-08 PROCEDURE — D9220A PRA ANESTHESIA: ICD-10-PCS | Mod: ANES,,, | Performed by: ANESTHESIOLOGY

## 2020-01-08 PROCEDURE — 25000003 PHARM REV CODE 250: Performed by: EMERGENCY MEDICINE

## 2020-01-08 PROCEDURE — 99284 EMERGENCY DEPT VISIT MOD MDM: CPT | Mod: 25

## 2020-01-08 PROCEDURE — 23650 CLTX SHO DSLC W/MNPJ WO ANES: CPT

## 2020-01-08 RX ORDER — HYDROCODONE BITARTRATE AND ACETAMINOPHEN 5; 325 MG/1; MG/1
1 TABLET ORAL EVERY 4 HOURS PRN
Qty: 12 TABLET | Refills: 0 | Status: SHIPPED | OUTPATIENT
Start: 2020-01-08 | End: 2020-01-18

## 2020-01-08 RX ORDER — HYDROMORPHONE HYDROCHLORIDE 2 MG/ML
1 INJECTION, SOLUTION INTRAMUSCULAR; INTRAVENOUS; SUBCUTANEOUS
Status: COMPLETED | OUTPATIENT
Start: 2020-01-08 | End: 2020-01-08

## 2020-01-08 RX ORDER — PROPOFOL 10 MG/ML
INJECTION, EMULSION INTRAVENOUS
Status: DISCONTINUED | OUTPATIENT
Start: 2020-01-08 | End: 2020-01-08

## 2020-01-08 RX ORDER — DIPHENHYDRAMINE HYDROCHLORIDE 50 MG/ML
25 INJECTION INTRAMUSCULAR; INTRAVENOUS
Status: COMPLETED | OUTPATIENT
Start: 2020-01-08 | End: 2020-01-08

## 2020-01-08 RX ORDER — LIDOCAINE HCL/PF 100 MG/5ML
SYRINGE (ML) INTRAVENOUS
Status: DISCONTINUED | OUTPATIENT
Start: 2020-01-08 | End: 2020-01-08

## 2020-01-08 RX ORDER — ONDANSETRON 4 MG/1
4 TABLET, ORALLY DISINTEGRATING ORAL
Status: COMPLETED | OUTPATIENT
Start: 2020-01-08 | End: 2020-01-08

## 2020-01-08 RX ORDER — ONDANSETRON 4 MG/1
4 TABLET, FILM COATED ORAL EVERY 8 HOURS PRN
Qty: 12 TABLET | Refills: 0 | Status: SHIPPED | OUTPATIENT
Start: 2020-01-08 | End: 2021-10-13 | Stop reason: SDUPTHER

## 2020-01-08 RX ADMIN — LIDOCAINE HYDROCHLORIDE 60 MG: 20 INJECTION, SOLUTION INTRAVENOUS at 03:01

## 2020-01-08 RX ADMIN — HYDROMORPHONE HYDROCHLORIDE 1 MG: 2 INJECTION, SOLUTION INTRAMUSCULAR; INTRAVENOUS; SUBCUTANEOUS at 02:01

## 2020-01-08 RX ADMIN — PROPOFOL 60 MG: 10 INJECTION, EMULSION INTRAVENOUS at 03:01

## 2020-01-08 RX ADMIN — ONDANSETRON 4 MG: 4 TABLET, ORALLY DISINTEGRATING ORAL at 02:01

## 2020-01-08 RX ADMIN — DIPHENHYDRAMINE HYDROCHLORIDE 25 MG: 50 INJECTION INTRAMUSCULAR; INTRAVENOUS at 02:01

## 2020-01-08 NOTE — TRANSFER OF CARE
Anesthesia Transfer of Care Note    Patient: Daisy Severino    Procedure(s) Performed: Procedure(s) (LRB):  CLOSED REDUCTION (Right)    Patient location: Emergency Department    Anesthesia Type: MAC    Transport from OR: Transported from OR on room air with adequate spontaneous ventilation    Post pain: adequate analgesia    Post assessment: no apparent anesthetic complications    Post vital signs: stable    Level of consciousness: awake    Nausea/Vomiting: no nausea/vomiting    Complications: none    Transfer of care protocol was followed      Last vitals:   Visit Vitals  /61 (BP Location: Left arm, Patient Position: Lying)   Pulse (!) 57   Temp 37 °C (98.6 °F) (Oral)   Resp 15   Ht 5' (1.524 m)   Wt 81.6 kg (180 lb)   SpO2 98%   Breastfeeding? No   BMI 35.15 kg/m²

## 2020-01-08 NOTE — ED PROVIDER NOTES
Encounter Date: 1/8/2020    SCRIBE #1 NOTE: I, Elizabeth Mayfield, am scribing for, and in the presence of,  Ayaz Randle MD. I have scribed the following portions of the note - Other sections scribed: HPI, ROS.       History     Chief Complaint   Patient presents with    Shoulder Pain     P t report right shoulder dislocation.       69 year old female patient presents to the ED complaining of right shoulder pain status post shoulder dislocation this morning. She states that she was getting dressed this morning when she tripped over a suitcase without falling. She reports that she was startled and flailed her arms back, which she states dislocated her shoulder. She reports that this is the third time that she has dislocated this shoulder. She denies any other associated symptoms. She reports occasional alcohol use, but denies any tobacco or drug use. She reports that she has not eaten today, but had 2 cups of coffee at 1000.    The history is provided by the patient.     Review of patient's allergies indicates:   Allergen Reactions    Codeine Nausea And Vomiting     Past Medical History:   Diagnosis Date    Abnormal Pap smear of cervix     Cancer of kidney     Hypertension     Renal carcinoma     left kidney stage 1 renal cell carcinoma, s/p L nephrectomy 4/16/2013    Sciatica      Past Surgical History:   Procedure Laterality Date    BREAST LUMPECTOMY  age 16    benign    HYSTERECTOMY  4/1988    partial hysterectomy for heavy bleeding    kidney removal  4/2014    left kidney    NEPHRECTOMY Left     TONSILLECTOMY  1957     Family History   Problem Relation Age of Onset    Cancer Father 61        bladder cancer, lung cancer (smoker)    Breast cancer Maternal Aunt     Breast cancer Maternal Aunt     Diabetes Maternal Grandmother     Diabetes Paternal Grandmother     Cancer Paternal Grandfather         head & neck cancer (smoker)     Social History     Tobacco Use    Smoking status: Never Smoker     Smokeless tobacco: Never Used   Substance Use Topics    Alcohol use: Yes     Alcohol/week: 5.0 standard drinks     Types: 5 Glasses of wine per week     Frequency: 2-3 times a week     Drinks per session: 1 or 2     Binge frequency: Never     Comment: occasional wine    Drug use: No     Review of Systems   Constitutional: Negative for chills, diaphoresis and fever.   HENT: Negative for ear pain and sore throat.    Eyes: Negative for visual disturbance.   Respiratory: Negative for cough and shortness of breath.    Cardiovascular: Negative for chest pain.   Gastrointestinal: Negative for abdominal pain, diarrhea and vomiting.   Genitourinary: Negative for dysuria.   Musculoskeletal: Positive for arthralgias (Right shoulder pain). Negative for myalgias.   Skin: Negative for rash.       Physical Exam     Initial Vitals   BP Pulse Resp Temp SpO2   01/08/20 1326 01/08/20 1326 01/08/20 1326 01/08/20 1527 01/08/20 1326   (!) 194/77 65 16 98.6 °F (37 °C) 96 %      MAP       --                Physical Exam  The patient was examined specifically for the following:   General:No significant distress, Good color, Warm and dry. Head and neck:Scalp atraumatic, Neck supple. Neurological:Appropriate conversation, Gross motor deficits. Eyes:Conjugate gaze, Clear corneas. ENT: No epistaxis. Cardiac: Regular rate and rhythm, Grossly normal heart tones. Pulmonary: Wheezing, Rales. Gastrointestinal: Abdominal tenderness, Abdominal distention. Musculoskeletal: Extremity deformity, Apparent pain with range of motion of the joints. Skin: Rash.   The findings on examination were normal except for the following:  Patient has tenderness and pain with range of motion of the right shoulder.  ED Course   Orthopedic Injury  Date/Time: 1/8/2020 4:29 PM  Performed by: Ayaz Randle MD  Authorized by: Ayaz Randle MD     Injury:     Injury location:  Shoulder    Location details:  Right shoulder    Injury type:  Dislocation    Dislocation  type: anterior      Chronicity:  Recurrent      Pre-procedure assessment:     Distal perfusion: normal      Neurological function: normal      Range of motion: reduced        Selections made in this section will also lock the Injury type section above.:     Complications: No      Specimens: No      Implants: No    Post-procedure assessment:     Neurovascular status: Neurovascularly intact      Distal perfusion: normal      Neurological function: normal      Range of motion: improved       Shoulder immobilizer applied      Labs Reviewed - No data to display       Imaging Results          X-Ray Shoulder Complete 2 View Right (Final result)  Result time 01/08/20 16:24:16    Final result by Tab Hdz MD (01/08/20 16:24:16)                 Impression:      As above      Electronically signed by: Tab Hdz MD  Date:    01/08/2020  Time:    16:24             Narrative:    EXAMINATION:  XR SHOULDER COMPLETE 2 OR MORE VIEWS RIGHT    CLINICAL HISTORY:  Recurrent dislocation, right shoulder    TECHNIQUE:  Two or three views of the right shoulder were performed.    COMPARISON:  01/08/2020    FINDINGS:  Two views right shoulder.    Since the previous examination, closed reduction has been performed in this patient with previous anterior shoulder dislocation.  The right humeral head appears to maintain appropriate relationship with the glenoid on the images provided.  The acromioclavicular joint is intact.  No acute displaced right rib fracture.  No new fracture.                               X-Ray Shoulder Trauma Right (Final result)  Result time 01/08/20 15:00:21    Final result by Joss Lazcano MD (01/08/20 15:00:21)                 Impression:      Anterior-inferior dislocation of the right shoulder.      Electronically signed by: Joss Lazcano MD  Date:    01/08/2020  Time:    15:00             Narrative:    EXAMINATION:  XR SHOULDER TRAUMA 3 VIEW RIGHT    CLINICAL HISTORY:  Pain in right  shoulder    TECHNIQUE:  Three or four views of the right shoulder were performed.    COMPARISON:  03/28/2019.    FINDINGS:  There is diffuse demineralization of the osseous structures.  No cortical step-off is identified.  There is a suspected anterior-inferior dislocation of the right shoulder.  There is no evidence of a fracture.    The visualized right hemithorax is within normal limits.  There is no evidence of a pneumothorax.                              Medical decision making:  Given the above this patient presents to the emergency room with anterior dislocation of the right shoulder.  It was reduced.  Post reduction studies are unremarkable. Patient was neurologically intact after the reduction.  Conscious sedation was provided by anesthesia                Scribe Attestation:   Scribe #1: I performed the above scribed service and the documentation accurately describes the services I performed. I attest to the accuracy of the note.                          Clinical Impression:       ICD-10-CM ICD-9-CM   1. Right shoulder pain M25.511 719.41   2. Shoulder dislocation, recurrent, right M24.411 718.31            I personally performed the services described in this documentation.  All medical record  entries made by the scribe are at my direction and in my presence.  Signed, Dr. Jer Randle MD  01/08/20 4021

## 2020-01-08 NOTE — ED TRIAGE NOTES
Pt has h/o dislocated right shoulder and rotator cuff issues. She says she tripped over suitcase but didn't fall, she flailed arms back and right arm feels stuck and cant move it

## 2020-01-09 NOTE — ANESTHESIA PREPROCEDURE EVALUATION
01/09/2020  Daisy Severino is a 69 y.o., female.  Pre-operative evaluation for Procedure(s) (LRB):  CLOSED REDUCTION (Right)    NPO >4h black coffee; > 8h food  METS 4-6    Patient Active Problem List   Diagnosis    Borderline diabetes mellitus    Hyperlipidemia    Essential hypertension    History of herpes zoster    History of renal carcinoma    Neck and shoulder pain    Fatty liver    Postmenopausal    Severe obesity (BMI 35.0-35.9 with comorbidity)    Osteoarthritis of spine with radiculopathy, cervical region    Cervical radicular pain    Osteopenia determined by x-ray    Gastroesophageal reflux disease    Left-sided low back pain with left-sided sciatica    Dizziness    Pain    Right rotator cuff tear       Review of patient's allergies indicates:   Allergen Reactions    Codeine Nausea And Vomiting       No current facility-administered medications on file prior to encounter.      Current Outpatient Medications on File Prior to Encounter   Medication Sig Dispense Refill    calcium carbonate (OS-REGAN) 600 mg calcium (1,500 mg) Tab Take 600 mg by mouth once.      estradiol (ESTRACE) 0.5 MG tablet Take 1 tablet (0.5 mg total) by mouth once daily. 90 tablet 3    famotidine (PEPCID) 20 MG tablet Take 20 mg by mouth once daily.       fish oil-omega-3 fatty acids 300-1,000 mg capsule Take 2 g by mouth once daily.      loratadine (CLARITIN) 10 mg tablet Take 10 mg by mouth once daily.      losartan (COZAAR) 50 MG tablet Take 1 tablet (50 mg total) by mouth once daily. 90 tablet 3    multivitamin (ONE DAILY MULTIVITAMIN) per tablet Take 1 tablet by mouth once daily.      triamterene-hydrochlorothiazide 37.5-25 mg (MAXZIDE-25) 37.5-25 mg per tablet Take 1 tablet by mouth once daily. 90 tablet 1    vitamin D 1000 units Tab Take 1,000 Units by mouth once daily.      ACCU-CHEK FASTCLIX  LANCET DRUM Misc USE TO TEST BLOOD GLUCOSE ONCE DAILY 102 each 6    ASHWAGANDHA ROOT EXTRACT,BULK, MISC by Misc.(Non-Drug; Combo Route) route.      blood sugar diagnostic (ACCU-CHEK FAVIOLA PLUS TEST STRP) Strp USE ONE STRIP TO CHECK GLUCOSE ONCE DAILY 50 strip 6    diazePAM (VALIUM) 5 MG tablet Take 1 tablet 1 hour before MRI.  Take 1 tablet 30 minutes before MRI. 2 tablet 0    diclofenac sodium (VOLTAREN) 1 % Gel Apply 2 g topically daily as needed. for neck and shoulder pain - Topical 100 g 1    mupirocin (BACTROBAN) 2 % ointment Apply topically 2 (two) times daily. To area on ear 22 g 1       Past Surgical History:   Procedure Laterality Date    BREAST LUMPECTOMY  age 16    benign    HYSTERECTOMY  4/1988    partial hysterectomy for heavy bleeding    kidney removal  4/2014    left kidney    NEPHRECTOMY Left     TONSILLECTOMY  1957       Social History     Socioeconomic History    Marital status:      Spouse name: Not on file    Number of children: Not on file    Years of education: Not on file    Highest education level: Not on file   Occupational History    Not on file   Social Needs    Financial resource strain: Not hard at all    Food insecurity:     Worry: Never true     Inability: Never true    Transportation needs:     Medical: No     Non-medical: No   Tobacco Use    Smoking status: Never Smoker    Smokeless tobacco: Never Used   Substance and Sexual Activity    Alcohol use: Yes     Alcohol/week: 5.0 standard drinks     Types: 5 Glasses of wine per week     Frequency: 2-3 times a week     Drinks per session: 1 or 2     Binge frequency: Never     Comment: occasional wine    Drug use: No    Sexual activity: Yes     Partners: Male     Birth control/protection: None   Lifestyle    Physical activity:     Days per week: 6 days     Minutes per session: 150+ min    Stress: Not at all   Relationships    Social connections:     Talks on phone: Twice a week     Gets together: Once a week      Attends [employer] service: More than 4 times per year     Active member of club or organization: No     Attends meetings of clubs or organizations: Never     Relationship status:    Other Topics Concern    Not on file   Social History Narrative    Previously ,  passed away. In relationship with boyfriend x 6 years. 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          CBC: No results for input(s): WBC, RBC, HGB, HCT, PLT, MCV, MCH, MCHC in the last 72 hours.    CMP: No results for input(s): NA, K, CL, CO2, BUN, CREATININE, GLU, MG, PHOS, CALCIUM, ALBUMIN, PROT, ALKPHOS, ALT, AST, BILITOT in the last 72 hours.    INR  No results for input(s): PT, INR, PROTIME, APTT in the last 72 hours.        Diagnostic Studies:      EKD Echo:  No results found for this or any previous visit.      Anesthesia Evaluation    I have reviewed the Patient Summary Reports.     I have reviewed the Medications.     Review of Systems  Anesthesia Hx:  No problems with previous Anesthesia  History of prior surgery of interest to airway management or planning: Denies Family Hx of Anesthesia complications.   Denies Personal Hx of Anesthesia complications.   Social:  Non-Smoker    Hematology/Oncology:  Hematology Normal   Oncology Normal     EENT/Dental:EENT/Dental Normal   Cardiovascular:   Exercise tolerance: good Hypertension ECG has been reviewed.    Pulmonary:  Pulmonary Normal    Renal/:   Chronic Renal Disease    Hepatic/GI:   GERD Liver Disease,    Musculoskeletal:   Arthritis  R shoulder dislocation   Neurological:   Neuromuscular Disease,    Endocrine:  Endocrine Normal        Physical Exam  General:  Obesity    Airway/Jaw/Neck:  Airway Findings: Mouth Opening: Small, but > 3cm Tongue: Normal  General Airway Assessment: Adult  Mallampati: III  TM Distance: 4 - 6 cm        Eyes/Ears/Nose:  EYES/EARS/NOSE FINDINGS: Normal   Dental:  Dental Findings: In tact   Chest/Lungs:  Chest/Lungs Clear     Heart/Vascular:  Heart Findings: Normal       Mental Status:  Mental Status Findings: Normal        Anesthesia Plan  Type of Anesthesia, risks & benefits discussed:  Anesthesia Type:  MAC  Patient's Preference:   Intra-op Monitoring Plan: standard ASA monitors  Intra-op Monitoring Plan Comments:   Post Op Pain Control Plan: multimodal analgesia  Post Op Pain Control Plan Comments:   Induction:   IV  Beta Blocker:  Patient is not currently on a Beta-Blocker (No further documentation required).       Informed Consent: Patient understands risks and agrees with Anesthesia plan.  Questions answered. Anesthesia consent signed with patient.  ASA Score: 2     Day of Surgery Review of History & Physical:  There are no significant changes.          Ready For Surgery From Anesthesia Perspective.

## 2020-01-17 ENCOUNTER — PATIENT MESSAGE (OUTPATIENT)
Dept: INTERNAL MEDICINE | Facility: CLINIC | Age: 70
End: 2020-01-17

## 2020-01-17 DIAGNOSIS — I10 ESSENTIAL HYPERTENSION: ICD-10-CM

## 2020-01-17 RX ORDER — LOSARTAN POTASSIUM 50 MG/1
50 TABLET ORAL DAILY
Qty: 90 TABLET | Refills: 0 | Status: SHIPPED | OUTPATIENT
Start: 2020-01-17 | End: 2020-01-21 | Stop reason: SDUPTHER

## 2020-01-17 RX ORDER — TRIAMTERENE/HYDROCHLOROTHIAZID 37.5-25 MG
1 TABLET ORAL DAILY
Qty: 90 TABLET | Refills: 0 | Status: SHIPPED | OUTPATIENT
Start: 2020-01-17 | End: 2020-01-21 | Stop reason: SDUPTHER

## 2020-01-17 NOTE — TELEPHONE ENCOUNTER
Requesting printed 90-day rx for her losartan and triamterene-HCTZ as she wants to compare prices at different pharmacies.

## 2020-01-21 RX ORDER — TRIAMTERENE/HYDROCHLOROTHIAZID 37.5-25 MG
1 TABLET ORAL DAILY
Qty: 90 TABLET | Refills: 0 | Status: SHIPPED | OUTPATIENT
Start: 2020-01-21 | End: 2020-04-21

## 2020-01-21 RX ORDER — LOSARTAN POTASSIUM 50 MG/1
50 TABLET ORAL DAILY
Qty: 90 TABLET | Refills: 0 | Status: SHIPPED | OUTPATIENT
Start: 2020-01-21 | End: 2020-04-21

## 2020-01-23 ENCOUNTER — PATIENT OUTREACH (OUTPATIENT)
Dept: ADMINISTRATIVE | Facility: OTHER | Age: 70
End: 2020-01-23

## 2020-02-07 ENCOUNTER — PATIENT OUTREACH (OUTPATIENT)
Dept: ADMINISTRATIVE | Facility: OTHER | Age: 70
End: 2020-02-07

## 2020-02-11 ENCOUNTER — OFFICE VISIT (OUTPATIENT)
Dept: UROLOGY | Facility: CLINIC | Age: 70
End: 2020-02-11
Payer: MEDICARE

## 2020-02-11 VITALS
HEIGHT: 60 IN | WEIGHT: 180 LBS | DIASTOLIC BLOOD PRESSURE: 46 MMHG | BODY MASS INDEX: 35.34 KG/M2 | SYSTOLIC BLOOD PRESSURE: 96 MMHG | HEART RATE: 65 BPM

## 2020-02-11 DIAGNOSIS — C64.9 CANCER OF KIDNEY, UNSPECIFIED LATERALITY: Primary | ICD-10-CM

## 2020-02-11 PROCEDURE — 3074F PR MOST RECENT SYSTOLIC BLOOD PRESSURE < 130 MM HG: ICD-10-PCS | Mod: CPTII,S$GLB,, | Performed by: UROLOGY

## 2020-02-11 PROCEDURE — 1100F PR PT FALLS ASSESS DOC 2+ FALLS/FALL W/INJURY/YR: ICD-10-PCS | Mod: CPTII,S$GLB,, | Performed by: UROLOGY

## 2020-02-11 PROCEDURE — 3288F PR FALLS RISK ASSESSMENT DOCUMENTED: ICD-10-PCS | Mod: CPTII,S$GLB,, | Performed by: UROLOGY

## 2020-02-11 PROCEDURE — 81002 POCT URINE DIPSTICK WITHOUT MICROSCOPE: ICD-10-PCS | Mod: S$GLB,,, | Performed by: UROLOGY

## 2020-02-11 PROCEDURE — 3078F DIAST BP <80 MM HG: CPT | Mod: CPTII,S$GLB,, | Performed by: UROLOGY

## 2020-02-11 PROCEDURE — 3078F PR MOST RECENT DIASTOLIC BLOOD PRESSURE < 80 MM HG: ICD-10-PCS | Mod: CPTII,S$GLB,, | Performed by: UROLOGY

## 2020-02-11 PROCEDURE — 99213 PR OFFICE/OUTPT VISIT, EST, LEVL III, 20-29 MIN: ICD-10-PCS | Mod: 25,S$GLB,, | Performed by: UROLOGY

## 2020-02-11 PROCEDURE — 81002 URINALYSIS NONAUTO W/O SCOPE: CPT | Mod: S$GLB,,, | Performed by: UROLOGY

## 2020-02-11 PROCEDURE — 1159F PR MEDICATION LIST DOCUMENTED IN MEDICAL RECORD: ICD-10-PCS | Mod: S$GLB,,, | Performed by: UROLOGY

## 2020-02-11 PROCEDURE — 99213 OFFICE O/P EST LOW 20 MIN: CPT | Mod: 25,S$GLB,, | Performed by: UROLOGY

## 2020-02-11 PROCEDURE — 1159F MED LIST DOCD IN RCRD: CPT | Mod: S$GLB,,, | Performed by: UROLOGY

## 2020-02-11 PROCEDURE — 1100F PTFALLS ASSESS-DOCD GE2>/YR: CPT | Mod: CPTII,S$GLB,, | Performed by: UROLOGY

## 2020-02-11 PROCEDURE — 3288F FALL RISK ASSESSMENT DOCD: CPT | Mod: CPTII,S$GLB,, | Performed by: UROLOGY

## 2020-02-11 PROCEDURE — 3074F SYST BP LT 130 MM HG: CPT | Mod: CPTII,S$GLB,, | Performed by: UROLOGY

## 2020-02-11 NOTE — PROGRESS NOTES
Subjective:      Daisy Severino is a 69 y.o. female who returns today regarding her     Seven years status post nephrectomy for renal cell carcinoma.  No symptoms of local recurrence.  No symptoms of metastatic disease.  No  complaints.    The following portions of the patient's history were reviewed and updated as appropriate: allergies, current medications, past family history, past medical history, past social history, past surgical history and problem list.    Review of Systems  Pertinent items are noted in HPI.  A comprehensive multipoint review of systems was negative except as otherwise stated in the HPI.     Objective:   Vitals: There were no vitals taken for this visit.    Physical Exam   General: alert and oriented, no acute distress  Respiratory: Symmetric expansion, non-labored breathing  Cardiovascular: normal to inspection  Abdomen: non distended   Skin: normal coloration and turgor, no rashes, no suspicious skin lesions noted  Neuro: no gross deficits  Psych: normal judgment and insight, normal mood/affect and non-anxious  Incisions well healed    Physical Exam    Lab Review   Urinalysis demonstrates negative for all components  Lab Results   Component Value Date    WBC 8.04 04/08/2019    HGB 13.6 04/08/2019    HCT 43.9 04/08/2019    MCV 91 04/08/2019     04/08/2019     Lab Results   Component Value Date    CREATININE 1.1 10/25/2019    BUN 32 (H) 10/25/2019       Imaging  -  Assessment and Plan:   Cancer of kidney, unspecified laterality  conventional [id number] ROSA MARIA*7years    Follow-up with primary physician  Return to clinic p.r.n.

## 2020-02-13 LAB
BILIRUB SERPL-MCNC: NORMAL MG/DL
BLOOD URINE, POC: NORMAL
COLOR, POC UA: YELLOW
GLUCOSE UR QL STRIP: NORMAL
KETONES UR QL STRIP: NORMAL
LEUKOCYTE ESTERASE URINE, POC: NORMAL
NITRITE, POC UA: NORMAL
PH, POC UA: 6
PROTEIN, POC: NORMAL
SPECIFIC GRAVITY, POC UA: 1.01
UROBILINOGEN, POC UA: NORMAL

## 2020-04-03 ENCOUNTER — TELEPHONE (OUTPATIENT)
Dept: INTERNAL MEDICINE | Facility: CLINIC | Age: 70
End: 2020-04-03

## 2020-04-17 DIAGNOSIS — I10 ESSENTIAL HYPERTENSION: ICD-10-CM

## 2020-04-21 DIAGNOSIS — I10 ESSENTIAL HYPERTENSION: ICD-10-CM

## 2020-04-21 RX ORDER — TRIAMTERENE/HYDROCHLOROTHIAZID 37.5-25 MG
1 TABLET ORAL DAILY
Qty: 90 TABLET | Refills: 1 | Status: SHIPPED | OUTPATIENT
Start: 2020-04-21 | End: 2020-07-17 | Stop reason: SDUPTHER

## 2020-04-21 RX ORDER — LOSARTAN POTASSIUM 50 MG/1
50 TABLET ORAL DAILY
Qty: 90 TABLET | Refills: 0 | OUTPATIENT
Start: 2020-04-21 | End: 2021-04-21

## 2020-04-21 RX ORDER — LOSARTAN POTASSIUM 50 MG/1
50 TABLET ORAL DAILY
Qty: 90 TABLET | Refills: 1 | Status: SHIPPED | OUTPATIENT
Start: 2020-04-21 | End: 2020-07-17 | Stop reason: SDUPTHER

## 2020-04-21 RX ORDER — TRIAMTERENE/HYDROCHLOROTHIAZID 37.5-25 MG
1 TABLET ORAL DAILY
Qty: 90 TABLET | Refills: 0 | OUTPATIENT
Start: 2020-04-21 | End: 2021-04-21

## 2020-04-21 NOTE — TELEPHONE ENCOUNTER
----- Message from Marissa Symone sent at 4/21/2020  4:11 PM CDT -----  Contact: self   Pt states she is following up on her Rx request b/c the pharmacy closes at 6pm    Requesting an RX refill or new RX.  Is this a refill or new RX: refil    RX name and strength: losartan (COZAAR) 50 MG tablet  Directions (copy/paste from chart):  Sig - Route: Take 1 tablet (50 mg total) by mouth once daily. - Oral  Is this a 30 day or 90 day RX:  90  Local pharmacy or mail order pharmacy:  local  Pharmacy name and phone # (copy/paste from chart):   [facility] ([place]) - [place] - [address]          [phone] (Phone)  [phone] (Fax)  Comments:  Pt states she is out of medication     Requesting an RX refill or new RX.  Is this a refill or new RX:  refill  RX name and strength: triamterene-hydrochlorothiazide 37.5-25 mg (MAXZIDE-25) 37.5-25 mg per tablet  Directions (copy/paste from chart):  Sig - Route: Take 1 tablet by mouth once daily. - Oral  Is this a 30 day or 90 day RX:  90  Local pharmacy or mail order pharmacy:  local  Pharmacy name and phone # (copy/paste from chart):[facility] ([place]) - [place] - [address]          [phone] (Phone)  [phone] (Fax)     Comments:

## 2020-04-21 NOTE — TELEPHONE ENCOUNTER
----- Message from Lili Castro sent at 4/21/2020 11:45 AM CDT -----  Contact: self/[phone]  Requesting an RX refill or new RX.  Is this a refill or new RX: refil    RX name and strength: losartan (COZAAR) 50 MG tablet  Directions (copy/paste from chart):  Sig - Route: Take 1 tablet (50 mg total) by mouth once daily. - Oral  Is this a 30 day or 90 day RX:  90  Local pharmacy or mail order pharmacy:  local  Pharmacy name and phone # (copy/paste from chart):   [facility] ([place]) - [place] - [address] [phone] (Phone)  [phone] (Fax)  Comments:      Requesting an RX refill or new RX.  Is this a refill or new RX:  refill  RX name and strength: triamterene-hydrochlorothiazide 37.5-25 mg (MAXZIDE-25) 37.5-25 mg per tablet  Directions (copy/paste from chart):  Sig - Route: Take 1 tablet by mouth once daily. - Oral  Is this a 30 day or 90 day RX:  90  Local pharmacy or mail order pharmacy:  local  Pharmacy name and phone # (copy/paste from chart):[facility] ([place]) - [place] - [address] [phone] (Phone)  [phone] (Fax)     Comments:

## 2020-05-07 ENCOUNTER — PATIENT MESSAGE (OUTPATIENT)
Dept: INTERNAL MEDICINE | Facility: CLINIC | Age: 70
End: 2020-05-07

## 2020-05-07 DIAGNOSIS — M25.519 NECK AND SHOULDER PAIN: ICD-10-CM

## 2020-05-07 DIAGNOSIS — M54.12 CERVICAL RADICULAR PAIN: ICD-10-CM

## 2020-05-07 DIAGNOSIS — M47.22 OSTEOARTHRITIS OF SPINE WITH RADICULOPATHY, CERVICAL REGION: ICD-10-CM

## 2020-05-07 DIAGNOSIS — M54.2 NECK AND SHOULDER PAIN: ICD-10-CM

## 2020-05-07 RX ORDER — DICLOFENAC SODIUM 10 MG/G
2 GEL TOPICAL DAILY PRN
Qty: 100 G | Refills: 1 | OUTPATIENT
Start: 2020-05-07

## 2020-05-07 RX ORDER — DICLOFENAC SODIUM 10 MG/G
2 GEL TOPICAL 2 TIMES DAILY PRN
Qty: 100 G | Refills: 1 | Status: SHIPPED | OUTPATIENT
Start: 2020-05-07 | End: 2022-02-18 | Stop reason: SDUPTHER

## 2020-05-16 ENCOUNTER — PATIENT MESSAGE (OUTPATIENT)
Dept: INTERNAL MEDICINE | Facility: CLINIC | Age: 70
End: 2020-05-16

## 2020-05-18 NOTE — TELEPHONE ENCOUNTER
Patient requesting testing for covid19 but not sure re: nasal swab/PCR or antibody test. Msg sent clarifying roles for each test. Awaiting reply.

## 2020-06-17 ENCOUNTER — TELEPHONE (OUTPATIENT)
Dept: INTERNAL MEDICINE | Facility: CLINIC | Age: 70
End: 2020-06-17

## 2020-06-17 ENCOUNTER — LAB VISIT (OUTPATIENT)
Dept: LAB | Facility: HOSPITAL | Age: 70
End: 2020-06-17
Attending: INTERNAL MEDICINE
Payer: MEDICARE

## 2020-06-17 ENCOUNTER — PATIENT MESSAGE (OUTPATIENT)
Dept: INTERNAL MEDICINE | Facility: CLINIC | Age: 70
End: 2020-06-17

## 2020-06-17 DIAGNOSIS — R31.9 HEMATURIA, UNSPECIFIED TYPE: ICD-10-CM

## 2020-06-17 DIAGNOSIS — R31.9 HEMATURIA, UNSPECIFIED TYPE: Primary | ICD-10-CM

## 2020-06-17 LAB
BACTERIA #/AREA URNS AUTO: ABNORMAL /HPF
BILIRUB UR QL STRIP: NEGATIVE
CLARITY UR REFRACT.AUTO: CLEAR
COLOR UR AUTO: ABNORMAL
GLUCOSE UR QL STRIP: NEGATIVE
HGB UR QL STRIP: ABNORMAL
KETONES UR QL STRIP: NEGATIVE
LEUKOCYTE ESTERASE UR QL STRIP: ABNORMAL
MICROSCOPIC COMMENT: ABNORMAL
NITRITE UR QL STRIP: NEGATIVE
PH UR STRIP: 7 [PH] (ref 5–8)
PROT UR QL STRIP: NEGATIVE
RBC #/AREA URNS AUTO: 0 /HPF (ref 0–4)
SP GR UR STRIP: 1 (ref 1–1.03)
SQUAMOUS #/AREA URNS AUTO: 0 /HPF
URN SPEC COLLECT METH UR: ABNORMAL
WBC #/AREA URNS AUTO: 6 /HPF (ref 0–5)

## 2020-06-17 PROCEDURE — 81001 URINALYSIS AUTO W/SCOPE: CPT

## 2020-06-17 NOTE — TELEPHONE ENCOUNTER
----- Message from Lili Castro sent at 6/17/2020  9:13 AM CDT -----  Regarding: blood in urine  Contact: self/ [phone]  Pt states she has not feeling good for the past couple of day. Pt states she has a small amount of urine in her blood but its enough to know something is not right and she is really concerned. Pt would like to know is there any type of medication that she can take for this. Please call and advise

## 2020-06-17 NOTE — TELEPHONE ENCOUNTER
Placed order for urinalysis due to blood in urine . She is coming today. She also wants an appointment with you to discuss other health issues
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
## 2020-06-19 ENCOUNTER — OFFICE VISIT (OUTPATIENT)
Dept: INTERNAL MEDICINE | Facility: CLINIC | Age: 70
End: 2020-06-19
Payer: MEDICARE

## 2020-06-19 ENCOUNTER — PATIENT MESSAGE (OUTPATIENT)
Dept: INTERNAL MEDICINE | Facility: CLINIC | Age: 70
End: 2020-06-19

## 2020-06-19 ENCOUNTER — LAB VISIT (OUTPATIENT)
Dept: LAB | Facility: HOSPITAL | Age: 70
End: 2020-06-19
Attending: INTERNAL MEDICINE
Payer: MEDICARE

## 2020-06-19 VITALS
OXYGEN SATURATION: 96 % | SYSTOLIC BLOOD PRESSURE: 134 MMHG | WEIGHT: 189.81 LBS | HEIGHT: 60 IN | DIASTOLIC BLOOD PRESSURE: 82 MMHG | BODY MASS INDEX: 37.27 KG/M2 | TEMPERATURE: 98 F | HEART RATE: 71 BPM

## 2020-06-19 DIAGNOSIS — R73.03 BORDERLINE DIABETES MELLITUS: ICD-10-CM

## 2020-06-19 DIAGNOSIS — R31.9 HEMATURIA, UNSPECIFIED TYPE: Primary | ICD-10-CM

## 2020-06-19 DIAGNOSIS — E66.01 SEVERE OBESITY (BMI 35.0-39.9) WITH COMORBIDITY: ICD-10-CM

## 2020-06-19 DIAGNOSIS — I10 ESSENTIAL HYPERTENSION: ICD-10-CM

## 2020-06-19 DIAGNOSIS — Z85.528 HISTORY OF RENAL CARCINOMA: ICD-10-CM

## 2020-06-19 DIAGNOSIS — K76.0 FATTY LIVER: ICD-10-CM

## 2020-06-19 DIAGNOSIS — R31.9 HEMATURIA, UNSPECIFIED TYPE: ICD-10-CM
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
## 2020-06-19 DIAGNOSIS — E78.2 MIXED HYPERLIPIDEMIA: ICD-10-CM

## 2020-06-19 LAB
ALBUMIN SERPL BCP-MCNC: 3.9 G/DL (ref 3.5–5.2)
ALP SERPL-CCNC: 98 U/L (ref 55–135)
ALT SERPL W/O P-5'-P-CCNC: 21 U/L (ref 10–44)
ANION GAP SERPL CALC-SCNC: 9 MMOL/L (ref 8–16)
AST SERPL-CCNC: 23 U/L (ref 10–40)
BACTERIA #/AREA URNS AUTO: NORMAL /HPF
BASOPHILS # BLD AUTO: 0.07 K/UL (ref 0–0.2)
BASOPHILS NFR BLD: 0.6 % (ref 0–1.9)
BILIRUB SERPL-MCNC: 0.3 MG/DL (ref 0.1–1)
BILIRUB UR QL STRIP: NEGATIVE
BUN SERPL-MCNC: 23 MG/DL (ref 8–23)
CALCIUM SERPL-MCNC: 9.7 MG/DL (ref 8.7–10.5)
CHLORIDE SERPL-SCNC: 104 MMOL/L (ref 95–110)
CHOLEST SERPL-MCNC: 200 MG/DL (ref 120–199)
CHOLEST/HDLC SERPL: 3 {RATIO} (ref 2–5)
CK SERPL-CCNC: 92 U/L (ref 20–180)
CLARITY UR REFRACT.AUTO: CLEAR
CO2 SERPL-SCNC: 25 MMOL/L (ref 23–29)
COLOR UR AUTO: ABNORMAL
CREAT SERPL-MCNC: 1.1 MG/DL (ref 0.5–1.4)
DIFFERENTIAL METHOD: ABNORMAL
EOSINOPHIL # BLD AUTO: 0.1 K/UL (ref 0–0.5)
EOSINOPHIL NFR BLD: 0.9 % (ref 0–8)
ERYTHROCYTE [DISTWIDTH] IN BLOOD BY AUTOMATED COUNT: 13.2 % (ref 11.5–14.5)
EST. GFR  (AFRICAN AMERICAN): 59.6 ML/MIN/1.73 M^2
EST. GFR  (NON AFRICAN AMERICAN): 51.7 ML/MIN/1.73 M^2
GLUCOSE SERPL-MCNC: 97 MG/DL (ref 70–110)
GLUCOSE UR QL STRIP: NEGATIVE
HCT VFR BLD AUTO: 47 % (ref 37–48.5)
HDLC SERPL-MCNC: 66 MG/DL (ref 40–75)
HDLC SERPL: 33 % (ref 20–50)
HGB BLD-MCNC: 14.3 G/DL (ref 12–16)
HGB UR QL STRIP: ABNORMAL
IMM GRANULOCYTES # BLD AUTO: 0.03 K/UL (ref 0–0.04)
IMM GRANULOCYTES NFR BLD AUTO: 0.3 % (ref 0–0.5)
KETONES UR QL STRIP: NEGATIVE
LDLC SERPL CALC-MCNC: 110 MG/DL (ref 63–159)
LEUKOCYTE ESTERASE UR QL STRIP: ABNORMAL
LYMPHOCYTES # BLD AUTO: 2.6 K/UL (ref 1–4.8)
LYMPHOCYTES NFR BLD: 22.6 % (ref 18–48)
MCH RBC QN AUTO: 28.1 PG (ref 27–31)
MCHC RBC AUTO-ENTMCNC: 30.4 G/DL (ref 32–36)
MCV RBC AUTO: 93 FL (ref 82–98)
MICROSCOPIC COMMENT: NORMAL
MONOCYTES # BLD AUTO: 0.8 K/UL (ref 0.3–1)
MONOCYTES NFR BLD: 7.3 % (ref 4–15)
NEUTROPHILS # BLD AUTO: 7.9 K/UL (ref 1.8–7.7)
NEUTROPHILS NFR BLD: 68.3 % (ref 38–73)
NITRITE UR QL STRIP: NEGATIVE
NONHDLC SERPL-MCNC: 134 MG/DL
NRBC BLD-RTO: 0 /100 WBC
PH UR STRIP: 6 [PH] (ref 5–8)
PLATELET # BLD AUTO: 296 K/UL (ref 150–350)
PMV BLD AUTO: 10.6 FL (ref 9.2–12.9)
POTASSIUM SERPL-SCNC: 4.2 MMOL/L (ref 3.5–5.1)
PROT SERPL-MCNC: 7.8 G/DL (ref 6–8.4)
PROT UR QL STRIP: NEGATIVE
RBC # BLD AUTO: 5.08 M/UL (ref 4–5.4)
RBC #/AREA URNS AUTO: 2 /HPF (ref 0–4)
SODIUM SERPL-SCNC: 138 MMOL/L (ref 136–145)
SP GR UR STRIP: 1 (ref 1–1.03)
SQUAMOUS #/AREA URNS AUTO: 0 /HPF
TRIGL SERPL-MCNC: 120 MG/DL (ref 30–150)
URN SPEC COLLECT METH UR: ABNORMAL
WBC # BLD AUTO: 11.58 K/UL (ref 3.9–12.7)
WBC #/AREA URNS AUTO: 5 /HPF (ref 0–5)

## 2020-06-19 PROCEDURE — 83036 HEMOGLOBIN GLYCOSYLATED A1C: CPT

## 2020-06-19 PROCEDURE — 3075F SYST BP GE 130 - 139MM HG: CPT | Mod: CPTII,S$GLB,, | Performed by: INTERNAL MEDICINE

## 2020-06-19 PROCEDURE — 81001 URINALYSIS AUTO W/SCOPE: CPT

## 2020-06-19 PROCEDURE — 80053 COMPREHEN METABOLIC PANEL: CPT

## 2020-06-19 PROCEDURE — 82550 ASSAY OF CK (CPK): CPT

## 2020-06-19 PROCEDURE — 3288F FALL RISK ASSESSMENT DOCD: CPT | Mod: CPTII,S$GLB,, | Performed by: INTERNAL MEDICINE

## 2020-06-19 PROCEDURE — 99214 OFFICE O/P EST MOD 30 MIN: CPT | Mod: S$GLB,,, | Performed by: INTERNAL MEDICINE

## 2020-06-19 PROCEDURE — 1126F AMNT PAIN NOTED NONE PRSNT: CPT | Mod: S$GLB,,, | Performed by: INTERNAL MEDICINE

## 2020-06-19 PROCEDURE — 36415 COLL VENOUS BLD VENIPUNCTURE: CPT

## 2020-06-19 PROCEDURE — 3075F PR MOST RECENT SYSTOLIC BLOOD PRESS GE 130-139MM HG: ICD-10-PCS | Mod: CPTII,S$GLB,, | Performed by: INTERNAL MEDICINE

## 2020-06-19 PROCEDURE — 87086 URINE CULTURE/COLONY COUNT: CPT

## 2020-06-19 PROCEDURE — 99999 PR PBB SHADOW E&M-EST. PATIENT-LVL V: ICD-10-PCS | Mod: PBBFAC,,, | Performed by: INTERNAL MEDICINE

## 2020-06-19 PROCEDURE — 1100F PTFALLS ASSESS-DOCD GE2>/YR: CPT | Mod: CPTII,S$GLB,, | Performed by: INTERNAL MEDICINE

## 2020-06-19 PROCEDURE — 3288F PR FALLS RISK ASSESSMENT DOCUMENTED: ICD-10-PCS | Mod: CPTII,S$GLB,, | Performed by: INTERNAL MEDICINE

## 2020-06-19 PROCEDURE — 87186 SC STD MICRODIL/AGAR DIL: CPT

## 2020-06-19 PROCEDURE — 3079F PR MOST RECENT DIASTOLIC BLOOD PRESSURE 80-89 MM HG: ICD-10-PCS | Mod: CPTII,S$GLB,, | Performed by: INTERNAL MEDICINE

## 2020-06-19 PROCEDURE — 1100F PR PT FALLS ASSESS DOC 2+ FALLS/FALL W/INJURY/YR: ICD-10-PCS | Mod: CPTII,S$GLB,, | Performed by: INTERNAL MEDICINE

## 2020-06-19 PROCEDURE — 80061 LIPID PANEL: CPT

## 2020-06-19 PROCEDURE — 85025 COMPLETE CBC W/AUTO DIFF WBC: CPT

## 2020-06-19 PROCEDURE — 1126F PR PAIN SEVERITY QUANTIFIED, NO PAIN PRESENT: ICD-10-PCS | Mod: S$GLB,,, | Performed by: INTERNAL MEDICINE

## 2020-06-19 PROCEDURE — 3079F DIAST BP 80-89 MM HG: CPT | Mod: CPTII,S$GLB,, | Performed by: INTERNAL MEDICINE

## 2020-06-19 PROCEDURE — 1159F PR MEDICATION LIST DOCUMENTED IN MEDICAL RECORD: ICD-10-PCS | Mod: S$GLB,,, | Performed by: INTERNAL MEDICINE

## 2020-06-19 PROCEDURE — 87077 CULTURE AEROBIC IDENTIFY: CPT

## 2020-06-19 PROCEDURE — 99214 PR OFFICE/OUTPT VISIT, EST, LEVL IV, 30-39 MIN: ICD-10-PCS | Mod: S$GLB,,, | Performed by: INTERNAL MEDICINE

## 2020-06-19 PROCEDURE — 87088 URINE BACTERIA CULTURE: CPT

## 2020-06-19 PROCEDURE — 1159F MED LIST DOCD IN RCRD: CPT | Mod: S$GLB,,, | Performed by: INTERNAL MEDICINE

## 2020-06-19 PROCEDURE — 99999 PR PBB SHADOW E&M-EST. PATIENT-LVL V: CPT | Mod: PBBFAC,,, | Performed by: INTERNAL MEDICINE
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
## 2020-06-20 LAB
ESTIMATED AVG GLUCOSE: 123 MG/DL (ref 68–131)
HBA1C MFR BLD HPLC: 5.9 % (ref 4–5.6)

## 2020-06-21 LAB — BACTERIA UR CULT: ABNORMAL

## 2020-06-21 RX ORDER — HYDROCODONE BITARTRATE AND ACETAMINOPHEN 5; 325 MG/1; MG/1
TABLET ORAL
COMMUNITY
Start: 2020-03-20 | End: 2020-06-21

## 2020-06-22 DIAGNOSIS — B96.4 URINARY TRACT INFECTION DUE TO PROTEUS: Primary | ICD-10-CM

## 2020-06-22 DIAGNOSIS — N39.0 URINARY TRACT INFECTION DUE TO PROTEUS: Primary | ICD-10-CM

## 2020-06-22 RX ORDER — SULFAMETHOXAZOLE AND TRIMETHOPRIM 800; 160 MG/1; MG/1
1 TABLET ORAL 2 TIMES DAILY
Qty: 14 TABLET | Refills: 0 | Status: SHIPPED | OUTPATIENT
Start: 2020-06-22 | End: 2020-06-29

## 2020-06-22 NOTE — PROGRESS NOTES
Please call patient to let her know her urine culture does show UTI due to bacteria called Proteus mirabilis which was sensitive to all the antibiotics tested for it. Rx for bactrim to be taken twice daily for 7 days has been sent in to [facility] pharmacy. She should definitely continue to keep very well-hydrated.   She can also review results / my comments on patient portal.

## 2020-07-17 ENCOUNTER — PATIENT MESSAGE (OUTPATIENT)
Dept: INTERNAL MEDICINE | Facility: CLINIC | Age: 70
End: 2020-07-17

## 2020-07-17 DIAGNOSIS — I10 ESSENTIAL HYPERTENSION: ICD-10-CM

## 2020-07-17 RX ORDER — TRIAMTERENE/HYDROCHLOROTHIAZID 37.5-25 MG
1 TABLET ORAL DAILY
Qty: 90 TABLET | Refills: 1 | Status: SHIPPED | OUTPATIENT
Start: 2020-07-17 | End: 2021-04-20

## 2020-07-17 RX ORDER — LOSARTAN POTASSIUM 50 MG/1
50 TABLET ORAL DAILY
Qty: 90 TABLET | Refills: 1 | Status: SHIPPED | OUTPATIENT
Start: 2020-07-17 | End: 2021-04-19

## 2020-07-18 NOTE — TELEPHONE ENCOUNTER
Refills sent on losartan and triamterene-HCTZ  Let pt know that if she is feeling worse over the weekend she could do UC virtual visit  Otherwise f/u as planned

## 2020-07-21 ENCOUNTER — PES CALL (OUTPATIENT)
Dept: ADMINISTRATIVE | Facility: CLINIC | Age: 70
End: 2020-07-21

## 2020-07-29 ENCOUNTER — PATIENT OUTREACH (OUTPATIENT)
Dept: ADMINISTRATIVE | Facility: HOSPITAL | Age: 70
End: 2020-07-29

## 2020-07-29 DIAGNOSIS — M81.0 OSTEOPOROSIS, UNSPECIFIED OSTEOPOROSIS TYPE, UNSPECIFIED PATHOLOGICAL FRACTURE PRESENCE: Primary | ICD-10-CM

## 2020-08-12 ENCOUNTER — HOSPITAL ENCOUNTER (OUTPATIENT)
Dept: RADIOLOGY | Facility: CLINIC | Age: 70
Discharge: HOME | End: 2020-08-12
Attending: INTERNAL MEDICINE
Payer: MEDICARE

## 2020-08-12 ENCOUNTER — OFFICE VISIT (OUTPATIENT)
Dept: INTERNAL MEDICINE | Facility: CLINIC | Age: 70
End: 2020-08-12
Payer: MEDICARE

## 2020-08-12 VITALS
SYSTOLIC BLOOD PRESSURE: 134 MMHG | OXYGEN SATURATION: 96 % | WEIGHT: 191.81 LBS | DIASTOLIC BLOOD PRESSURE: 70 MMHG | HEIGHT: 60 IN | BODY MASS INDEX: 37.66 KG/M2 | HEART RATE: 68 BPM

## 2020-08-12 DIAGNOSIS — R73.03 BORDERLINE DIABETES MELLITUS: ICD-10-CM

## 2020-08-12 DIAGNOSIS — J30.89 ENVIRONMENTAL AND SEASONAL ALLERGIES: ICD-10-CM

## 2020-08-12 DIAGNOSIS — M85.80 OSTEOPENIA DETERMINED BY X-RAY: ICD-10-CM

## 2020-08-12 DIAGNOSIS — R31.9 HEMATURIA, UNSPECIFIED TYPE: Primary | ICD-10-CM

## 2020-08-12 DIAGNOSIS — E66.01 SEVERE OBESITY (BMI 35.0-39.9) WITH COMORBIDITY: ICD-10-CM

## 2020-08-12 DIAGNOSIS — Z85.528 HISTORY OF RENAL CARCINOMA: ICD-10-CM

## 2020-08-12 DIAGNOSIS — M81.0 OSTEOPOROSIS, UNSPECIFIED OSTEOPOROSIS TYPE, UNSPECIFIED PATHOLOGICAL FRACTURE PRESENCE: ICD-10-CM

## 2020-08-12 DIAGNOSIS — M75.101 TEAR OF RIGHT ROTATOR CUFF, UNSPECIFIED TEAR EXTENT, UNSPECIFIED WHETHER TRAUMATIC: ICD-10-CM

## 2020-08-12 DIAGNOSIS — I10 ESSENTIAL HYPERTENSION: ICD-10-CM

## 2020-08-12 DIAGNOSIS — M54.2 NECK AND SHOULDER PAIN: ICD-10-CM

## 2020-08-12 DIAGNOSIS — Z12.31 ENCOUNTER FOR SCREENING MAMMOGRAM FOR MALIGNANT NEOPLASM OF BREAST: ICD-10-CM

## 2020-08-12 DIAGNOSIS — M25.519 NECK AND SHOULDER PAIN: ICD-10-CM

## 2020-08-12 DIAGNOSIS — K21.9 GASTROESOPHAGEAL REFLUX DISEASE, ESOPHAGITIS PRESENCE NOT SPECIFIED: ICD-10-CM

## 2020-08-12 PROCEDURE — 3075F PR MOST RECENT SYSTOLIC BLOOD PRESS GE 130-139MM HG: ICD-10-PCS | Mod: CPTII,S$GLB,, | Performed by: INTERNAL MEDICINE

## 2020-08-12 PROCEDURE — 77080 DXA BONE DENSITY AXIAL: CPT | Mod: TC

## 2020-08-12 PROCEDURE — 1101F PT FALLS ASSESS-DOCD LE1/YR: CPT | Mod: CPTII,S$GLB,, | Performed by: INTERNAL MEDICINE

## 2020-08-12 PROCEDURE — 3078F DIAST BP <80 MM HG: CPT | Mod: CPTII,S$GLB,, | Performed by: INTERNAL MEDICINE

## 2020-08-12 PROCEDURE — 99999 PR PBB SHADOW E&M-EST. PATIENT-LVL V: ICD-10-PCS | Mod: PBBFAC,,, | Performed by: INTERNAL MEDICINE

## 2020-08-12 PROCEDURE — 3008F BODY MASS INDEX DOCD: CPT | Mod: CPTII,S$GLB,, | Performed by: INTERNAL MEDICINE

## 2020-08-12 PROCEDURE — 3288F PR FALLS RISK ASSESSMENT DOCUMENTED: ICD-10-PCS | Mod: CPTII,S$GLB,, | Performed by: INTERNAL MEDICINE

## 2020-08-12 PROCEDURE — 3288F FALL RISK ASSESSMENT DOCD: CPT | Mod: CPTII,S$GLB,, | Performed by: INTERNAL MEDICINE

## 2020-08-12 PROCEDURE — 77080 DEXA BONE DENSITY SPINE HIP: ICD-10-PCS | Mod: 26,,, | Performed by: INTERNAL MEDICINE

## 2020-08-12 PROCEDURE — 99215 PR OFFICE/OUTPT VISIT, EST, LEVL V, 40-54 MIN: ICD-10-PCS | Mod: S$GLB,,, | Performed by: INTERNAL MEDICINE

## 2020-08-12 PROCEDURE — 1101F PR PT FALLS ASSESS DOC 0-1 FALLS W/OUT INJ PAST YR: ICD-10-PCS | Mod: CPTII,S$GLB,, | Performed by: INTERNAL MEDICINE

## 2020-08-12 PROCEDURE — 3075F SYST BP GE 130 - 139MM HG: CPT | Mod: CPTII,S$GLB,, | Performed by: INTERNAL MEDICINE

## 2020-08-12 PROCEDURE — 1159F MED LIST DOCD IN RCRD: CPT | Mod: S$GLB,,, | Performed by: INTERNAL MEDICINE

## 2020-08-12 PROCEDURE — 1126F AMNT PAIN NOTED NONE PRSNT: CPT | Mod: S$GLB,,, | Performed by: INTERNAL MEDICINE

## 2020-08-12 PROCEDURE — 3078F PR MOST RECENT DIASTOLIC BLOOD PRESSURE < 80 MM HG: ICD-10-PCS | Mod: CPTII,S$GLB,, | Performed by: INTERNAL MEDICINE

## 2020-08-12 PROCEDURE — 1126F PR PAIN SEVERITY QUANTIFIED, NO PAIN PRESENT: ICD-10-PCS | Mod: S$GLB,,, | Performed by: INTERNAL MEDICINE

## 2020-08-12 PROCEDURE — 99215 OFFICE O/P EST HI 40 MIN: CPT | Mod: S$GLB,,, | Performed by: INTERNAL MEDICINE

## 2020-08-12 PROCEDURE — 77080 DXA BONE DENSITY AXIAL: CPT | Mod: 26,,, | Performed by: INTERNAL MEDICINE

## 2020-08-12 PROCEDURE — 3008F PR BODY MASS INDEX (BMI) DOCUMENTED: ICD-10-PCS | Mod: CPTII,S$GLB,, | Performed by: INTERNAL MEDICINE

## 2020-08-12 PROCEDURE — 1159F PR MEDICATION LIST DOCUMENTED IN MEDICAL RECORD: ICD-10-PCS | Mod: S$GLB,,, | Performed by: INTERNAL MEDICINE

## 2020-08-12 PROCEDURE — 99999 PR PBB SHADOW E&M-EST. PATIENT-LVL V: CPT | Mod: PBBFAC,,, | Performed by: INTERNAL MEDICINE

## 2020-08-12 RX ORDER — FAMOTIDINE 20 MG/1
20 TABLET, FILM COATED ORAL DAILY
Qty: 90 TABLET | Refills: 1 | Status: SHIPPED | OUTPATIENT
Start: 2020-08-12 | End: 2021-02-15

## 2020-08-18 ENCOUNTER — HOSPITAL ENCOUNTER (EMERGENCY)
Facility: HOSPITAL | Age: 70
Discharge: HOME | End: 2020-08-18
Attending: EMERGENCY MEDICINE
Payer: OTHER MISCELLANEOUS

## 2020-08-18 VITALS
WEIGHT: 190 LBS | SYSTOLIC BLOOD PRESSURE: 135 MMHG | OXYGEN SATURATION: 97 % | RESPIRATION RATE: 20 BRPM | TEMPERATURE: 98 F | HEART RATE: 63 BPM | DIASTOLIC BLOOD PRESSURE: 60 MMHG | HEIGHT: 61 IN | BODY MASS INDEX: 35.87 KG/M2
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
## 2020-08-18 DIAGNOSIS — S49.91XA RIGHT SHOULDER INJURY, INITIAL ENCOUNTER: ICD-10-CM

## 2020-08-18 DIAGNOSIS — S43.004A SHOULDER DISLOCATION, RIGHT, INITIAL ENCOUNTER: Primary | ICD-10-CM

## 2020-08-18 PROCEDURE — 99285 PR EMERGENCY DEPT VISIT,LEVEL V: ICD-10-PCS | Mod: 57,25,, | Performed by: EMERGENCY MEDICINE

## 2020-08-18 PROCEDURE — 99152 MOD SED SAME PHYS/QHP 5/>YRS: CPT

## 2020-08-18 PROCEDURE — 63600175 PHARM REV CODE 636 W HCPCS: Performed by: EMERGENCY MEDICINE

## 2020-08-18 PROCEDURE — 23650 CLTX SHO DSLC W/MNPJ WO ANES: CPT | Mod: 54,RT,, | Performed by: EMERGENCY MEDICINE

## 2020-08-18 PROCEDURE — 23650 PR CLOSED RX SHLDR DISLOCATION: ICD-10-PCS | Mod: 54,RT,, | Performed by: EMERGENCY MEDICINE

## 2020-08-18 PROCEDURE — 63600175 PHARM REV CODE 636 W HCPCS: Performed by: STUDENT IN AN ORGANIZED HEALTH CARE EDUCATION/TRAINING PROGRAM

## 2020-08-18 PROCEDURE — 99152 PR MOD CONSCIOUS SEDATION, SAME PHYS, 5+ YRS, FIRST 15 MIN: ICD-10-PCS | Mod: 59,,, | Performed by: EMERGENCY MEDICINE

## 2020-08-18 PROCEDURE — 25000003 PHARM REV CODE 250: Performed by: STUDENT IN AN ORGANIZED HEALTH CARE EDUCATION/TRAINING PROGRAM

## 2020-08-18 PROCEDURE — 23650 CLTX SHO DSLC W/MNPJ WO ANES: CPT | Mod: RT

## 2020-08-18 PROCEDURE — 99152 MOD SED SAME PHYS/QHP 5/>YRS: CPT | Mod: 59,,, | Performed by: EMERGENCY MEDICINE

## 2020-08-18 PROCEDURE — 99285 EMERGENCY DEPT VISIT HI MDM: CPT | Mod: 57,25,, | Performed by: EMERGENCY MEDICINE

## 2020-08-18 PROCEDURE — 99285 EMERGENCY DEPT VISIT HI MDM: CPT | Mod: 25

## 2020-08-18 PROCEDURE — 94761 N-INVAS EAR/PLS OXIMETRY MLT: CPT

## 2020-08-18 RX ORDER — LIDOCAINE HYDROCHLORIDE 10 MG/ML
20 INJECTION, SOLUTION EPIDURAL; INFILTRATION; INTRACAUDAL; PERINEURAL
Status: COMPLETED | OUTPATIENT
Start: 2020-08-18 | End: 2020-08-18

## 2020-08-18 RX ORDER — HYDROMORPHONE HYDROCHLORIDE 1 MG/ML
1 INJECTION, SOLUTION INTRAMUSCULAR; INTRAVENOUS; SUBCUTANEOUS
Status: COMPLETED | OUTPATIENT
Start: 2020-08-18 | End: 2020-08-18

## 2020-08-18 RX ORDER — PROPOFOL 10 MG/ML
80 VIAL (ML) INTRAVENOUS ONCE
Status: DISCONTINUED | OUTPATIENT
Start: 2020-08-18 | End: 2020-08-18 | Stop reason: HOSPADM

## 2020-08-18 RX ORDER — PROPOFOL 10 MG/ML
1 VIAL (ML) INTRAVENOUS
Status: COMPLETED | OUTPATIENT
Start: 2020-08-18 | End: 2020-08-18

## 2020-08-18 RX ADMIN — LIDOCAINE HYDROCHLORIDE 200 MG: 10 INJECTION, SOLUTION EPIDURAL; INFILTRATION; INTRACAUDAL; PERINEURAL at 02:08

## 2020-08-18 RX ADMIN — HYDROMORPHONE HYDROCHLORIDE 1 MG: 1 INJECTION, SOLUTION INTRAMUSCULAR; INTRAVENOUS; SUBCUTANEOUS at 01:08

## 2020-08-18 RX ADMIN — PROPOFOL 86 MG: 10 INJECTION, EMULSION INTRAVENOUS at 02:08

## 2020-08-18 NOTE — ED NOTES
Patient discharged home  Discharge instructions given and follow up discussed.   Patient verbalizes understanding   Patient denies pain, chest pain and shortness of breath   All belongings sent home with patient

## 2020-08-18 NOTE — ED NOTES
Patient identifiers verified and correct for Daisy Severino.    LOC: The patient is awake, alert and aware of environment with an appropriate affect, the patient is oriented x 3 and speaking appropriately.  APPEARANCE: Patient resting comfortably and in no acute distress, patient is clean and well groomed, patient's clothing is properly fastened.  SKIN: The skin is warm and dry, color consistent with ethnicity, patient has normal skin turgor and moist mucus membranes, abrasion to the right side of the mouth.  MUSCULOSKELETAL: Patient moving all extremities spontaneously, right shoulder deformity and pt c/o right elbow pain. .  RESPIRATORY: Airway is open and patent, respirations are spontaneous, patient has a normal effort and rate, no accessory muscle use noted  CARDIAC: Patient has a normal rate and regular rhythm, no periphreal edema noted, capillary refill < 3 seconds.  ABDOMEN: Soft and non tender to palpation, no distention noted, normoactive bowel sounds present in all four quadrants.  NEUROLOGIC: PERRL, eyes open spontaneously, behavior appropriate to situation, follows commands, facial expression symmetrical, bilateral hand grasp equal and even, purposeful motor response noted, normal sensation in all extremities when touched with a finger. +2 radial pulses bilaterally.

## 2020-08-18 NOTE — ED TRIAGE NOTES
Daisy Severino, a 70 y.o. female presents to the ED w/ complaint of mechanical fall with right shoulder deformity.  Pt has a hx of dislocations and was lined up for surgery before covid.  Pt denies LOC but has an abrasion to the right side of the mouth. Pt denies CP, SOB, cough, fever/chills, change in taste.     Triage note:  Chief Complaint   Patient presents with    Fall     PT reports falling while working landing on right shoudler. PT reports hx of right shoulder dislocation. Pt reports hitting right face on the ground.Denies LOC. no blood thinners.     Review of patient's allergies indicates:   Allergen Reactions    Codeine Nausea And Vomiting     Past Medical History:   Diagnosis Date    Abnormal Pap smear of cervix     Cancer of kidney     Hypertension     Renal carcinoma     left kidney stage 1 renal cell carcinoma, s/p L nephrectomy 4/16/2013    Sciatica

## 2020-08-18 NOTE — CONSULTS
Orthopedic Surgery  Consult Note    Daisy Severino  08/18/2020    CC: R shoulder pain    HPI: Daisy Severino is a RHD 70 y.o. female with PMHx significant for multiple prior R shoulder dislocations who presents to ED with R shoulder pain after fall onto R shoulder at work today. The patient states she tripped and used her R arm to break her fall. She felt immediate pain, and she felt her R shoulder come out of socket. The patient states she has dislocated her R shoulder several times beginning around 2 years ago after a fall, and 3-4 other times since. Sometimes she is able to reduce it herself, but this time she states the pain was too much for her to try. The patient is followed by Dr. Smith at [facility] who previously recommended surgery for her shoulder instability. The patient also has hx of R shoulder rotator cuff tear which is chronic. Denies head injury or LOC. Denies AC. She denies paresthesias to distal RUE and in axillary nerve distribution. The patient did ambulate without pain after the fall.      Past Medical History:   Diagnosis Date    Abnormal Pap smear of cervix     Cancer of kidney     Hypertension     Renal carcinoma     left kidney stage 1 renal cell carcinoma, s/p L nephrectomy 4/16/2013    Sciatica      Past Surgical History:   Procedure Laterality Date    BREAST LUMPECTOMY  age 16    benign    CLOSED REDUCTION Right 1/8/2020    Procedure: CLOSED REDUCTION;  Surgeon: Karmen Surgeon;  [facility]: [facility];  Service: Anesthesiology;  Laterality: Right;    HYSTERECTOMY  4/1988    partial hysterectomy for heavy bleeding    kidney removal  4/2014    left kidney    NEPHRECTOMY Left     TONSILLECTOMY  1957     Family History   Problem Relation Age of Onset    Cancer Father 61        bladder cancer, lung cancer (smoker)    Breast cancer Maternal Aunt     Breast cancer Maternal Aunt     Diabetes Maternal Grandmother     Diabetes Paternal Grandmother     Cancer Paternal  Grandfather         head & neck cancer (smoker)     Social History     Socioeconomic History    Marital status:      Spouse name: Not on file    Number of children: Not on file    Years of education: Not on file    Highest education level: Not on file   Occupational History    Not on file   Social Needs    Financial resource strain: Not hard at all    Food insecurity     Worry: Never true     Inability: Never true    Transportation needs     Medical: No     Non-medical: No   Tobacco Use    Smoking status: Never Smoker    Smokeless tobacco: Never Used   Substance and Sexual Activity    Alcohol use: Yes     Alcohol/week: 5.0 standard drinks     Types: 5 Glasses of wine per week     Frequency: 2-3 times a week     Drinks per session: 1 or 2     Binge frequency: Never     Comment: occasional wine    Drug use: No    Sexual activity: Yes     Partners: Male     Birth control/protection: None   Lifestyle    Physical activity     Days per week: 6 days     Minutes per session: 150+ min    Stress: Not at all   Relationships    Social connections     Talks on phone: Twice a week     Gets together: Once a week     Attends Hoah[employer] service: More than 4 times per year     Active member of club or organization: No     Attends meetings of clubs or organizations: Never     Relationship status:    Other Topics Concern    Not on file   Social History Narrative    Previously ,  passed away. In relationship with boyfriend x 6 years. 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      No current facility-administered medications on file prior to encounter.      Current Outpatient Medications on File Prior to Encounter   Medication Sig    ACCU-CHEK FASTCLIX LANCET DRUM Misc USE TO TEST BLOOD GLUCOSE ONCE DAILY    ASHWAGANDHA ROOT EXTRACT,BULK, MISC by Misc.(Non-Drug; Combo Route) route.    blood sugar diagnostic (ACCU-CHEK FAVIOLA PLUS TEST STRP) Strp USE ONE STRIP TO CHECK GLUCOSE ONCE DAILY     calcium carbonate (OS-REGAN) 600 mg calcium (1,500 mg) Tab Take 600 mg by mouth once.    diclofenac sodium (VOLTAREN) 1 % Gel Apply 2 g topically 2 (two) times daily as needed. for neck and shoulder pain - Topical    estradiol (ESTRACE) 0.5 MG tablet Take 1 tablet (0.5 mg total) by mouth once daily.    famotidine (PEPCID) 20 MG tablet Take 1 tablet (20 mg total) by mouth once daily. As needed for acid reflux    fish oil-omega-3 fatty acids 300-1,000 mg capsule Take 2 g by mouth once daily.    loratadine (CLARITIN) 10 mg tablet Take 10 mg by mouth once daily.    losartan (COZAAR) 50 MG tablet Take 1 tablet (50 mg total) by mouth once daily. For blood pressure    multivitamin (ONE DAILY MULTIVITAMIN) per tablet Take 1 tablet by mouth once daily.    mupirocin (BACTROBAN) 2 % ointment Apply topically 2 (two) times daily. To area on ear    ondansetron (ZOFRAN) 4 MG tablet Take 1 tablet (4 mg total) by mouth every 8 (eight) hours as needed (Nausea and vomiting).    triamterene-hydrochlorothiazide 37.5-25 mg (MAXZIDE-25) 37.5-25 mg per tablet Take 1 tablet by mouth once daily. For blood pressure    vitamin D 1000 units Tab Take 1,000 Units by mouth once daily.         Review of Systems:  Constitutional: negative for fevers  Eyes: negative visual changes  ENT: negative for hearing loss  Respiratory: negative for dyspnea  Cardiovascular: negative for chest pain  Gastrointestinal: negative for abdominal pain  Genitourinary: negative for dysuria  Neurological: negative for headaches  Behavioral/Psych: negative for hallucinations  Endocrine: negative for temperature intolerance      Physical Exam:    Temp:  [98.4 °F (36.9 °C)] 98.4 °F (36.9 °C)  Pulse:  [63-77] 63  Resp:  [17-25] 20  SpO2:  [96 %-100 %] 97 %  BP: (133-215)/() 135/60    Vitals: Afebrile.  Vital signs stable.  General: No acute distress.  HEENT: Normocephalic. Atraumatic. Sclera anicteric. No tracheal deviation.  Cardio: Regular rate.  Chest: No  increased work of breathing.  Abdominal: Nondistended.  Extremities: No cyanosis.  No clubbing.    Skin: No generalized rash.  Neuro: Awake. Alert. Oriented to person, place, time, and situation.  Psych: Normal appearance. Cooperative.  Appropriate mood.  Appropriate affect.      MSK:  PE:  Gen:  No acute distress  CV:  Peripherally well-perfused.    Lungs:  Normal respiratory effort.  Head/Neck:  Normocephalic.  Atraumatic.     RUE:  Skin intact, obvious indentation noted to lateral shoulder with obvious anterior shoulder deformity  No open wounds/abrasions/laceration  TTP about anterior shoulder  FROM elbow and wrist  ROM of shoulder limited 2/2 pain  SILT A/MSC  SILT M/U/R  Motor intact AIN/PIN/M/U/R   Cap refill < 2s  2+ RP      Diagnostic Results: XR of the R shoulder showing anterior subcoracoid shoulder dislocation      Assessment/Plan:  Daisy Severino is a 70 y.o. female with R anterior shoulder dislocated  - Reduced in ED and placed in abduction sling for comfort  -Post-reduction films show R shoulder reduced in glenoid and CT of R shoulder do not show any bony lesions  - Pt would like to follow-up with her Orthopaedic Surgeon Dr. Smith at [facility]. -Recommend the patient follow-up early this week for further evaluation and management.      Procedure Note R shoulder reduction under conscious sedation  All risks, benefits, and alternatives to treatment explained to patient. Verbalized consent to proceed was given by patient. Time out was performed and patient name, , site, and procedure were confirmed. Patient was sedated by ER staff physician. R shoulder was reduced under fluoroscopy. Post-reduction films were performed and confirmed adequate reduction. Patient tolerated procedure well. No complications from sedation were encountered by the ED staff physician during the procedure.       Yvon Cook MD  Orthopedic Surgery Resident  2020

## 2020-08-18 NOTE — DISCHARGE INSTRUCTIONS
If you feel your shoulder dislocate, or you have any other concerns, he should return to the emergency department.  Otherwise, follow up with your orthopedic surgeon at [facility].

## 2020-08-18 NOTE — ED PROVIDER NOTES
Encounter Date: 8/18/2020       History     Chief Complaint   Patient presents with    Fall     PT reports falling while working landing on right shoudler. PT reports hx of right shoulder dislocation. Pt reports hitting right face on the ground.Denies LOC. no blood thinners.     HPI     70-year-old female past medical history significant for past renal cell carcinoma status post nephrectomy, hypertension who presents to the emergency department for right shoulder pain.  Patient states she had a mechanical slip and fall at her work, states she fell onto her right shoulder, and states that she felt a pop after that.  States he feels similar to her previous dislocations of her shoulder, has been unable to range the shoulder since that time.  States she did hit her jaw, however has had no trouble moving her jaw since that time, denies any loss of conscious, not anticoagulated, no nausea or vomiting since this occurred.  Denying any neck pain at this time as well.  States that she has not take any medication to this prior to coming to the emergency department    Review of patient's allergies indicates:   Allergen Reactions    Codeine Nausea And Vomiting     Past Medical History:   Diagnosis Date    Abnormal Pap smear of cervix     Cancer of kidney     Hypertension     Renal carcinoma     left kidney stage 1 renal cell carcinoma, s/p L nephrectomy 4/16/2013    Sciatica      Past Surgical History:   Procedure Laterality Date    BREAST LUMPECTOMY  age 16    benign    CLOSED REDUCTION Right 1/8/2020    Procedure: CLOSED REDUCTION;  Surgeon: Karmen Surgeon;  Location: [facility];  Service: Anesthesiology;  Laterality: Right;    HYSTERECTOMY  4/1988    partial hysterectomy for heavy bleeding    kidney removal  4/2014    left kidney    NEPHRECTOMY Left     TONSILLECTOMY  1957     Family History   Problem Relation Age of Onset    Cancer Father 61        bladder cancer, lung cancer (smoker)    Breast cancer Maternal  Aunt     Breast cancer Maternal Aunt     Diabetes Maternal Grandmother     Diabetes Paternal Grandmother     Cancer Paternal Grandfather         head & neck cancer (smoker)     Social History     Tobacco Use    Smoking status: Never Smoker    Smokeless tobacco: Never Used   Substance Use Topics    Alcohol use: Yes     Alcohol/week: 5.0 standard drinks     Types: 5 Glasses of wine per week     Frequency: 2-3 times a week     Drinks per session: 1 or 2     Binge frequency: Never     Comment: occasional wine    Drug use: No     Review of Systems   Constitutional: Negative for chills and fever.   HENT: Negative for drooling and tinnitus.    Eyes: Negative for photophobia and visual disturbance.   Respiratory: Negative for chest tightness and shortness of breath.    Cardiovascular: Negative for chest pain and palpitations.   Gastrointestinal: Negative for abdominal pain, diarrhea, nausea and vomiting.   Genitourinary: Negative for flank pain and hematuria.   Musculoskeletal:        See HPI   Skin: Negative for color change and pallor.   Neurological: Negative for light-headedness and headaches.   Psychiatric/Behavioral: Negative for agitation and confusion.       Physical Exam     Initial Vitals [08/18/20 1206]   BP Pulse Resp Temp SpO2   (!) 215/78 77 18 98.4 °F (36.9 °C) 98 %      MAP       --         Physical Exam    Nursing note and vitals reviewed.  Constitutional: She appears well-developed and well-nourished.   Uncomfortable appearing but nontoxic   HENT:   Head: Normocephalic and atraumatic.   Eyes: EOM are normal. Pupils are equal, round, and reactive to light. No scleral icterus.   Neck: Normal range of motion. No tracheal deviation present. No JVD present.   Cardiovascular: Normal rate, regular rhythm and normal heart sounds. Exam reveals no gallop and no friction rub.    No murmur heard.  Pulmonary/Chest: Breath sounds normal. No respiratory distress. She has no wheezes. She has no rhonchi. She has no  "rales.   Abdominal: Soft. She exhibits no distension. There is no abdominal tenderness. There is no rebound and no guarding.   Genitourinary:    Genitourinary Comments: No CVAT bilaterally     Musculoskeletal:      Comments: Obvious deformity noted over the patient's right shoulder with squaring of the shoulder noted, neurovascularly intact with compartments that are soft, no sensory deficits, 2+ radials bilaterally   Neurological: She is alert and oriented to person, place, and time. No cranial nerve deficit.   Skin: Skin is warm and dry.   Psychiatric: She has a normal mood and affect. Thought content normal.         ED Course   Procedural Sedation        Date/Time: 2020 3:02 PM  Performed by: Mario Coates DO  Authorized by: Mario Coates DO   Consent Done: Yes  Consent: Written consent obtained.  Risks and benefits: risks, benefits and alternatives were discussed  Consent given by: patient  Patient understanding: patient states understanding of the procedure being performed  Patient consent: the patient's understanding of the procedure matches consent given  Patient identity confirmed: , MRN and name  Time out: Immediately prior to procedure a "time out" was called to verify the correct patient, procedure, equipment, support staff and site/side marked as required.  ASA Class: Class 2 - Mild Illness without functional impairment.  Mallampati Score: Class 2 - Visualization of the soft palate, fauces, and uvula.   Equipment: on cardiac monitor., on BP monitor., on CO2 monitor., on supplemental oxygen., suction available. and airway equipment available.   Sedation type: moderate (conscious) sedation  (See MAR for exact dosages of medications).  Sedatives: propofol  Analgesia: hydromorphone  Sedation start date/time: 2020 2:44 PM  Sedation end date/time: 2020 3:02 PM  Vitals: Vital signs were monitored during sedation.  Complications: No complications.   Patient/Family history of anesthesia or " sedation complications: No  Orthopedic Injury    Date/Time: 8/18/2020 2:00 PM  Performed by: Mario Coates DO  Authorized by: Mario Coates DO     Location procedure was performed:  [facility] EMERGENCY DEPARTMENT  Injury:     Injury location:  Shoulder    Location details:  Right shoulder    Injury type:  Dislocation    Dislocation type: anterior      Chronicity:  Recurrent    Hill-Sachs deformity?: No        Pre-procedure assessment:     Neurovascular status: Neurovascularly intact      Distal perfusion: normal      Neurological function: normal      Range of motion: reduced      Local anesthesia used?: Yes      Anesthesia:  Local infiltration    Local anesthetic:  Lidocaine 1% without epinephrine    Anesthetic total (ml):  15    Patient sedated?: Yes      ASA Class:  Class 2 - Mild Illness without functional impairment.    Mallampati Score:  Class 2 - Visualization of the soft palate, fauces, and uvula.  Date/Time of last solid:  8/18/2020 9:00 AM    Patient/Family history of anesthesia or sedation complications: No      Sedation type: moderate (conscious) sedation      Sedation:  Propofol and see MAR for details    Analgesia:  See MAR for details    Sedation start:  8/18/2020 2:44 PM    Sedation end:  8/18/2020 3:02 PM    Vital signs: Vital signs monitored during sedation        Selections made in this section will also lock the Injury type section above.:     Immobilization:  Sling  Post-procedure assessment:     Neurovascular status: Neurovascularly intact      Distal perfusion: normal      Neurological function: normal      Range of motion: splinted      Patient tolerance:  Patient tolerated the procedure well with no immediate complications      Labs Reviewed - No data to display       Imaging Results          CT Shoulder Without Contrast Right (Final result)  Result time 08/18/20 17:14:53    Final result by Tab Hdz MD (08/18/20 17:14:53)                 Impression:      1. In this patient status  post recent right shoulder dislocation and reduction, there is likely sequela of impaction injury involving the superolateral aspect of the right humeral head although given the corticated nature, may reflect that of remote injury.  Additionally, there is a thin os if extraocular adjacent to the posterior aspect of the glenoid, possibly reflecting avulsion injury versus prior trauma.  Correlation is advised.  2. Please see above for several additional findings.      Electronically signed by: Tab Hdz MD  Date:    08/18/2020  Time:    17:14             Narrative:    EXAMINATION:  CT SHOULDER WITHOUT CONTRAST RIGHT    CLINICAL HISTORY:  2mm cuts with 3d recons;    TECHNIQUE:  Axial images of the right shoulder were obtained at 0.5 mm intervals without administration of IV contrast.  Coronal and sagittal reformatted images were reviewed.    COMPARISON:  Radiograph 08/18/2020    FINDINGS:  Degenerative changes are noted of the glenohumeral joint.  The humeral head maintains appropriate relationship with the glenoid in this patient status post reduction of anterior shoulder dislocation.  Degenerative changes are noted of the acromioclavicular joint.  The clavicle appears intact.  Multilevel degenerative changes are noted of the visualized cervical spine as well as the thoracic spine.  There are findings of impaction injury involving the superolateral right humeral head, given the corticated nature, suggests this may be chronic.  Additionally, there is a thin os effect focus along the posterior aspect of the glenoid, possibly reflecting small avulsion injury.  There is right basilar dependent atelectasis.  There is soft tissue edema about the shoulder.                               X-Ray Shoulder Complete 2 View Right (Final result)  Result time 08/18/20 15:17:44    Final result by Joss Lazcano MD (08/18/20 15:17:44)                 Impression:      Interval reduction of prior right shoulder dislocation with  satisfactory alignment on the current examination.    No post reduction complication.      Electronically signed by: Joss Lazcano MD  Date:    08/18/2020  Time:    15:17             Narrative:    EXAMINATION:  XR SHOULDER COMPLETE 2 OR MORE VIEWS RIGHT    CLINICAL HISTORY:  Unspecified dislocation of right shoulder joint, initial encounter    TECHNIQUE:  Two or three views of the right shoulder were performed.    COMPARISON:  08/18/2020.    FINDINGS:  There has been interval reduction of the previous anterior-subcoracoid dislocation of the right shoulder.  The alignment is anatomic on the current examination.  No post reduction complication is identified.  There is marked arthropathy of the right shoulder.                               X-Ray Elbow Complete Right (Final result)  Result time 08/18/20 14:28:55    Final result by Tab Hdz MD (08/18/20 14:28:55)                 Impression:      1. No convincing acute displaced fracture or dislocation of the elbow.      Electronically signed by: Tab Hdz MD  Date:    08/18/2020  Time:    14:28             Narrative:    EXAMINATION:  XR ELBOW COMPLETE 3 VIEW RIGHT    CLINICAL HISTORY:  .    TECHNIQUE:  AP, lateral, and oblique views of the right elbow were performed.    COMPARISON:  03/17/2019    FINDINGS:  Three views right elbow.    No significant displacement of the anterior or posterior elbow fat pads.  Anterior humeral line and radiocapitellar line are in appropriate orientation.  No acute displaced fracture or dislocation of the elbow.  Degenerative changes are noted of the medial and lateral humeral epicondyles.  No significant edema.                               X-Ray Humerus 2 View Right (Final result)  Result time 08/18/20 14:21:12    Final result by Ayaz Gilman MD (08/18/20 14:21:12)                 Narrative:    EXAMINATION:  XR HUMERUS 2 VIEW RIGHT    TECHNIQUE:  Two views right humerus    COMPARISON:  None    FINDINGS:  Anterior and  caudal glenohumeral dislocation.  No definite fracture noted    I :  See above      Electronically signed by: Ayaz Gilman MD  Date:    08/18/2020  Time:    14:21                             X-ray Shoulder 2 or More Views Right (Final result)  Result time 08/18/20 14:16:18   Procedure changed from X-Ray Shoulder Trauma Right     Final result by Ayaz Gilman MD (08/18/20 14:16:18)                 Impression:      See above      Electronically signed by: Ayaz Gilman MD  Date:    08/18/2020  Time:    14:16             Narrative:    EXAMINATION:  XR SHOULDER COMPLETE 2 OR MORE VIEWS RIGHT    CLINICAL HISTORY:  Right shoulder injury;Unspecified injury of right shoulder and upper arm, initial encounter    TECHNIQUE:  Two or three views of the right shoulder were performed.    COMPARISON:  None    FINDINGS:  Anterior and caudal dislocation at the right shoulder identified.  No definite fracture.  DJD.                                 Medical Decision Making:   History:   I obtained history from: EMS provider.  Old Medical Records: I decided to obtain old medical records.  Old Records Summarized: records from previous admission(s).  Initial Assessment:   Assessment: 70 year old female with right shoulder pain    Plan:  Emergent evaluation of a 70-year-old female for right shoulder pain.  Patient is hemodynamically stable, afebrile.  On exam, patient demonstrates a fairly unremarkable cardiopulmonary exam, has obvious deformity noted to the right shoulder, has otherwise benign exam.  Her x-ray did demonstrate a displaced, dislocated right shoulder, anteriorly.  The patient had dislocation reduction as above, tolerated procedure well.  No evidence of fracture, and repeat x-ray was reassuring.  The patient will follow-up with her orthopedic surgeon.  I did offer the patient follow-up with our orthopedic surgery service, and she declined.  Voiced understanding for return precautions, stable for discharge.    Alexis Lopez,  HO-4  8/18/2020 3:04 PM    Independently Interpreted Test(s):   I have ordered and independently interpreted X-rays - see prior notes.  Clinical Tests:   Radiological Study: Ordered and Reviewed  Other:   I have discussed this case with another health care provider.       <> Summary of the Discussion: Orthopedic surgery              Attending Attestation:   Physician Attestation Statement for Resident:  As the supervising MD   Physician Attestation Statement: I have personally seen and examined this patient.   I agree with the above history. -:   As the supervising MD I agree with the above PE.    As the supervising MD I agree with the above treatment, course, plan, and disposition.            I have reviewed and concur with the resident's history, physical, assessment, and plan.  I have personally interviewed and examined the patient at bedside.  See below addendum for my evaluation and additional findings. I did supervise any and all procedures and was present for any critical portion, and was always immediately available for help and as a resource.     Briefly,  this is a 70 y.o.female with previous shoulder dislocation presenting with mechanical fall and right shoulder dislocation.  Neurovascular intact.  Local anesthetic, sedation and dislocation reduction per above procedure note.  Neurovascular intact post procedure, reduction films were obtained, CT scan obtained.  Reduction was done with the assistance of orthopedic surgery.  Follow-up plan in place.  Sling placed and comfort.    Complexity: High - level 5 procedural    Final diagnoses:  [S49.91XA] Right shoulder injury, initial encounter  [W19.XXXA] Fall  [S43.004A] Shoulder dislocation, right, initial encounter (Primary)       Mario Coates DO, FAAEM    Emergency Medicine Physician  Dept of Emergency Medicine   [facility]  Spectralink: [id number]                          Clinical Impression:       ICD-10-CM ICD-9-CM   1.  Shoulder dislocation, right, initial encounter  S43.004A 831.00   2. Right shoulder injury, initial encounter  S49.91XA 959.2   3. Fall  W19.XXXA E888.9         Disposition:   Disposition: Discharged  Condition: Stable     ED Disposition Condition    Discharge Stable        ED Prescriptions     None        Follow-up Information     Follow up With Specialties Details Why Contact Info    Your orthopedic surgeon at [facility]  Schedule an appointment as soon as possible for a visit in 1 week For follow up for your shoulder dislocation     [facility]  As needed, If symptoms worsen [address] [id number]  [phone]                                     Alexis Lopez MD  Resident  08/18/20 1551       Mario Coates DO  08/19/20 1124

## 2020-08-18 NOTE — FIRST PROVIDER EVALUATION
Emergency Department TeleTRIAGE Encounter Note      CHIEF COMPLAINT    Chief Complaint   Patient presents with    Fall     PT reports falling while working landing on right shoudler. PT reports hx of right shoulder dislocation. Pt reports hitting right face on the ground.Denies LOC. no blood thinners.       VITAL SIGNS   Initial Vitals [08/18/20 1206]   BP Pulse Resp Temp SpO2   (!) 215/78 77 18 98.4 °F (36.9 °C) 98 %      MAP       --            ALLERGIES    Review of patient's allergies indicates:   Allergen Reactions    Codeine Nausea And Vomiting       PROVIDER TRIAGE NOTE  Patient presents to the ER with chief complaint of right shoulder pain.  She reports that her shoulder dislocated during a fall at work 1 hour ago (she has history of multiple dislocations in this shoulder).  The patient tripped, then hit her right side of jaw and right shoulder on a shelf.  Mild pain in jaw, no additional head trauma or LOC.  Will order xray of shoulder pending ED provider evaluation. Patient is in no acute distress.       ORDERS  Labs Reviewed - No data to display    ED Orders (720h ago, onward)    None            Virtual Visit Note: The provider triage portion of this emergency department evaluation and documentation was performed via [employer], a HIPAA-compliant telemedicine application, in concert with a tele-presenter in the room. A face to face patient evaluation with one of my colleagues will occur once the patient is placed in an emergency department room.      DISCLAIMER: This note was prepared with [employer] voice recognition transcription software. Garbled syntax, mangled pronouns, and other bizarre constructions may be attributed to that software system.

## 2020-08-21 ENCOUNTER — PATIENT MESSAGE (OUTPATIENT)
Dept: INTERNAL MEDICINE | Facility: CLINIC | Age: 70
End: 2020-08-21

## 2020-08-26 ENCOUNTER — PATIENT MESSAGE (OUTPATIENT)
Dept: INTERNAL MEDICINE | Facility: CLINIC | Age: 70
End: 2020-08-26

## 2020-08-28 NOTE — TELEPHONE ENCOUNTER
Patient with recent injury, shoulder dislocation  Responded to messages  Should schedule appt if not continuing to improve or having any concerns  Can use diclofenac topical PRN

## 2020-09-01 ENCOUNTER — LAB VISIT (OUTPATIENT)
Dept: LAB | Facility: HOSPITAL | Age: 70
End: 2020-09-01
Payer: MEDICARE

## 2020-09-01 ENCOUNTER — TELEPHONE (OUTPATIENT)
Dept: INTERNAL MEDICINE | Facility: CLINIC | Age: 70
End: 2020-09-01

## 2020-09-01 ENCOUNTER — OFFICE VISIT (OUTPATIENT)
Dept: INTERNAL MEDICINE | Facility: CLINIC | Age: 70
End: 2020-09-01
Payer: MEDICARE

## 2020-09-01 VITALS
WEIGHT: 193.31 LBS | HEART RATE: 68 BPM | OXYGEN SATURATION: 98 % | DIASTOLIC BLOOD PRESSURE: 80 MMHG | BODY MASS INDEX: 36.5 KG/M2 | HEIGHT: 61 IN | SYSTOLIC BLOOD PRESSURE: 128 MMHG

## 2020-09-01 DIAGNOSIS — M24.411 SHOULDER DISLOCATION, RECURRENT, RIGHT: ICD-10-CM

## 2020-09-01 DIAGNOSIS — K12.2 ABSCESS OF ORAL TISSUE: Primary | ICD-10-CM

## 2020-09-01 DIAGNOSIS — K12.2 ABSCESS OF ORAL TISSUE: ICD-10-CM

## 2020-09-01 LAB
BASOPHILS # BLD AUTO: 0.08 K/UL (ref 0–0.2)
BASOPHILS NFR BLD: 0.7 % (ref 0–1.9)
DIFFERENTIAL METHOD: ABNORMAL
EOSINOPHIL # BLD AUTO: 0.1 K/UL (ref 0–0.5)
EOSINOPHIL NFR BLD: 1.1 % (ref 0–8)
ERYTHROCYTE [DISTWIDTH] IN BLOOD BY AUTOMATED COUNT: 13.2 % (ref 11.5–14.5)
HCT VFR BLD AUTO: 46.7 % (ref 37–48.5)
HGB BLD-MCNC: 13.9 G/DL (ref 12–16)
IMM GRANULOCYTES # BLD AUTO: 0.03 K/UL (ref 0–0.04)
IMM GRANULOCYTES NFR BLD AUTO: 0.2 % (ref 0–0.5)
LYMPHOCYTES # BLD AUTO: 2.2 K/UL (ref 1–4.8)
LYMPHOCYTES NFR BLD: 18.2 % (ref 18–48)
MCH RBC QN AUTO: 27.7 PG (ref 27–31)
MCHC RBC AUTO-ENTMCNC: 29.8 G/DL (ref 32–36)
MCV RBC AUTO: 93 FL (ref 82–98)
MONOCYTES # BLD AUTO: 1 K/UL (ref 0.3–1)
MONOCYTES NFR BLD: 8.3 % (ref 4–15)
NEUTROPHILS # BLD AUTO: 8.7 K/UL (ref 1.8–7.7)
NEUTROPHILS NFR BLD: 71.5 % (ref 38–73)
NRBC BLD-RTO: 0 /100 WBC
PLATELET # BLD AUTO: 331 K/UL (ref 150–350)
PMV BLD AUTO: 10.1 FL (ref 9.2–12.9)
RBC # BLD AUTO: 5.02 M/UL (ref 4–5.4)
WBC # BLD AUTO: 12.12 K/UL (ref 3.9–12.7)

## 2020-09-01 PROCEDURE — 3074F SYST BP LT 130 MM HG: CPT | Mod: CPTII,GC,S$GLB, | Performed by: STUDENT IN AN ORGANIZED HEALTH CARE EDUCATION/TRAINING PROGRAM

## 2020-09-01 PROCEDURE — 3008F PR BODY MASS INDEX (BMI) DOCUMENTED: ICD-10-PCS | Mod: CPTII,GC,S$GLB, | Performed by: STUDENT IN AN ORGANIZED HEALTH CARE EDUCATION/TRAINING PROGRAM

## 2020-09-01 PROCEDURE — 3079F DIAST BP 80-89 MM HG: CPT | Mod: CPTII,GC,S$GLB, | Performed by: STUDENT IN AN ORGANIZED HEALTH CARE EDUCATION/TRAINING PROGRAM

## 2020-09-01 PROCEDURE — 1159F PR MEDICATION LIST DOCUMENTED IN MEDICAL RECORD: ICD-10-PCS | Mod: GC,S$GLB,, | Performed by: STUDENT IN AN ORGANIZED HEALTH CARE EDUCATION/TRAINING PROGRAM

## 2020-09-01 PROCEDURE — 99214 PR OFFICE/OUTPT VISIT, EST, LEVL IV, 30-39 MIN: ICD-10-PCS | Mod: GC,S$GLB,, | Performed by: STUDENT IN AN ORGANIZED HEALTH CARE EDUCATION/TRAINING PROGRAM

## 2020-09-01 PROCEDURE — 1100F PTFALLS ASSESS-DOCD GE2>/YR: CPT | Mod: CPTII,GC,S$GLB, | Performed by: STUDENT IN AN ORGANIZED HEALTH CARE EDUCATION/TRAINING PROGRAM

## 2020-09-01 PROCEDURE — 3074F PR MOST RECENT SYSTOLIC BLOOD PRESSURE < 130 MM HG: ICD-10-PCS | Mod: CPTII,GC,S$GLB, | Performed by: STUDENT IN AN ORGANIZED HEALTH CARE EDUCATION/TRAINING PROGRAM

## 2020-09-01 PROCEDURE — 99214 OFFICE O/P EST MOD 30 MIN: CPT | Mod: GC,S$GLB,, | Performed by: STUDENT IN AN ORGANIZED HEALTH CARE EDUCATION/TRAINING PROGRAM

## 2020-09-01 PROCEDURE — 1100F PR PT FALLS ASSESS DOC 2+ FALLS/FALL W/INJURY/YR: ICD-10-PCS | Mod: CPTII,GC,S$GLB, | Performed by: STUDENT IN AN ORGANIZED HEALTH CARE EDUCATION/TRAINING PROGRAM

## 2020-09-01 PROCEDURE — 36415 COLL VENOUS BLD VENIPUNCTURE: CPT

## 2020-09-01 PROCEDURE — 3008F BODY MASS INDEX DOCD: CPT | Mod: CPTII,GC,S$GLB, | Performed by: STUDENT IN AN ORGANIZED HEALTH CARE EDUCATION/TRAINING PROGRAM

## 2020-09-01 PROCEDURE — 85025 COMPLETE CBC W/AUTO DIFF WBC: CPT

## 2020-09-01 PROCEDURE — 3288F FALL RISK ASSESSMENT DOCD: CPT | Mod: CPTII,GC,S$GLB, | Performed by: STUDENT IN AN ORGANIZED HEALTH CARE EDUCATION/TRAINING PROGRAM

## 2020-09-01 PROCEDURE — 3079F PR MOST RECENT DIASTOLIC BLOOD PRESSURE 80-89 MM HG: ICD-10-PCS | Mod: CPTII,GC,S$GLB, | Performed by: STUDENT IN AN ORGANIZED HEALTH CARE EDUCATION/TRAINING PROGRAM

## 2020-09-01 PROCEDURE — 1125F AMNT PAIN NOTED PAIN PRSNT: CPT | Mod: GC,S$GLB,, | Performed by: STUDENT IN AN ORGANIZED HEALTH CARE EDUCATION/TRAINING PROGRAM

## 2020-09-01 PROCEDURE — 99999 PR PBB SHADOW E&M-EST. PATIENT-LVL V: ICD-10-PCS | Mod: PBBFAC,GC,, | Performed by: STUDENT IN AN ORGANIZED HEALTH CARE EDUCATION/TRAINING PROGRAM

## 2020-09-01 PROCEDURE — 1159F MED LIST DOCD IN RCRD: CPT | Mod: GC,S$GLB,, | Performed by: STUDENT IN AN ORGANIZED HEALTH CARE EDUCATION/TRAINING PROGRAM

## 2020-09-01 PROCEDURE — 1125F PR PAIN SEVERITY QUANTIFIED, PAIN PRESENT: ICD-10-PCS | Mod: GC,S$GLB,, | Performed by: STUDENT IN AN ORGANIZED HEALTH CARE EDUCATION/TRAINING PROGRAM

## 2020-09-01 PROCEDURE — 99999 PR PBB SHADOW E&M-EST. PATIENT-LVL V: CPT | Mod: PBBFAC,GC,, | Performed by: STUDENT IN AN ORGANIZED HEALTH CARE EDUCATION/TRAINING PROGRAM

## 2020-09-01 PROCEDURE — 3288F PR FALLS RISK ASSESSMENT DOCUMENTED: ICD-10-PCS | Mod: CPTII,GC,S$GLB, | Performed by: STUDENT IN AN ORGANIZED HEALTH CARE EDUCATION/TRAINING PROGRAM

## 2020-09-01 RX ORDER — AMOXICILLIN AND CLAVULANATE POTASSIUM 875; 125 MG/1; MG/1
1 TABLET, FILM COATED ORAL 2 TIMES DAILY
Qty: 14 TABLET | Refills: 0 | Status: SHIPPED | OUTPATIENT
Start: 2020-09-01 | End: 2020-09-08

## 2020-09-01 RX ORDER — DOXYCYCLINE HYCLATE 100 MG/1
100 TABLET, DELAYED RELEASE ORAL 2 TIMES DAILY
Qty: 14 TABLET | Refills: 0 | Status: SHIPPED | OUTPATIENT
Start: 2020-09-01 | End: 2020-09-08

## 2020-09-01 NOTE — TELEPHONE ENCOUNTER
----- Message from Celine Perea sent at 9/1/2020  1:36 PM CDT -----  Contact: Howie sarabia/ [facility]  Type:  Pharmacy Calling to Clarify an RX    Name of Caller: Howie    Pharmacy Name: [facility] - [place] - [address] [place] [address] [phone] (Phone)  [phone] (Fax)    Prescription Name: doxycycline (DORYX) 100 MG EC tablet    What do they need to clarify?: confirm the drug    Additional Information:  Howie called to confirm the drug. He is requesting a call back.

## 2020-09-01 NOTE — PROGRESS NOTES
[facility]  Resident [facility] Note    Chief Complaint      Chief Complaint   Patient presents with    Facial Swelling     right side    Facial Pain     right side     History of Present Illness      Daisy Severino is a 70 y.o. female with chronic conditions of HTN, HLD, borderline DM, fatty liver, GERD, back pain, established w/ Dr. Ware, who presents today for: nodule on face.    Pt seen in the ED 08/18/20 for R shoulder dislocation s/p mechanical fall at work. Pt also hit the R side of her face on a shelf while falling. Ortho was consulted in the ED; they reduced and placed shoulder in abduction sling. Pt has since followed up with her Orthopaedic Surgeon Dr. Smith at [facility]. Of note, Pt has dislocated her R shoulder several times beginning around 2 years ago after a fall, and 3-4 other times since.     In terms of her facial trauma, Pt notes she had cuts on her R cheek as well as on the inside of her mouth after the fall. No head imaging was obtained, however examined in ED per Pt. The cuts were accompanied by swelling and bruising. The cuts seemed to heal and the swelling and bruising resolved over a week and a half after the fall.     However, Pt notes that this past Sunday (8/30/20), she noted a lump on her R cheek where she originally injured the skin. The lump has continued to increase in size. It has also become hot, painful (burning), and red. She has been gargling with salt water. She has also tried mupirocin with no avail. She has not noted any drainage. She denies any N/V, F/C, lightheadedness, dizziness, and other lumps/bumps in the area. She does have a chronic history of vertigo with no acute worsening for which she follows with ENT. It has been painful to chew on the R side however she has been swallowing w/o any difficulty.     Past Medical History:  Past Medical History:   Diagnosis Date    Abnormal Pap smear of cervix     Cancer of kidney     Hypertension     Renal  carcinoma     left kidney stage 1 renal cell carcinoma, s/p L nephrectomy 4/16/2013    Sciatica        Past Surgical History:   has a past surgical history that includes kidney removal (4/2014); Breast lumpectomy (age 16); Hysterectomy (4/1988); Nephrectomy (Left); Tonsillectomy (1957); and Closed reduction (Right, 1/8/2020).    Family History:  family history includes Breast cancer in her maternal aunt and maternal aunt; Cancer in her paternal grandfather; Cancer (age of onset: 61) in her father; Diabetes in her maternal grandmother and paternal grandmother.     Social History:  Social History     Tobacco Use    Smoking status: Never Smoker    Smokeless tobacco: Never Used   Substance Use Topics    Alcohol use: Yes     Alcohol/week: 5.0 standard drinks     Types: 5 Glasses of wine per week     Frequency: 2-3 times a week     Drinks per session: 1 or 2     Binge frequency: Never     Comment: occasional wine    Drug use: No       I personally reviewed all past medical, surgical, social and family history.    Review of Systems   Constitutional: Negative for chills, fever and malaise/fatigue.   HENT: Negative for congestion, ear discharge, ear pain, nosebleeds, sinus pain and sore throat.    Eyes: Negative for discharge and redness.   Respiratory: Negative for cough, shortness of breath and wheezing.    Cardiovascular: Negative for chest pain.   Gastrointestinal: Negative for diarrhea, nausea and vomiting.   Neurological: Negative for dizziness and headaches.        Medications:  Outpatient Encounter Medications as of 9/1/2020   Medication Sig Dispense Refill    ACCU-CHEK FASTCLIX LANCET DRUM [facility] USE TO TEST BLOOD GLUCOSE ONCE DAILY 102 each 6    amoxicillin-clavulanate 875-125mg (AUGMENTIN) 875-125 mg per tablet Take 1 tablet by mouth 2 (two) times daily. for 7 days 14 tablet 0    ASHWAGANDHA ROOT EXTRACT,BULK, MISC by Misc.(Non-Drug; Combo Route) route.      blood sugar diagnostic (ACCU-CHEK FAVIOLA PLUS TEST  STRP) Strp USE ONE STRIP TO CHECK GLUCOSE ONCE DAILY 50 strip 6    calcium carbonate (OS-REGAN) 600 mg calcium (1,500 mg) Tab Take 600 mg by mouth once.      diclofenac sodium (VOLTAREN) 1 % Gel Apply 2 g topically 2 (two) times daily as needed. for neck and shoulder pain - Topical 100 g 1    doxycycline (DORYX) 100 MG EC tablet Take 1 tablet (100 mg total) by mouth 2 (two) times daily. for 7 days 14 tablet 0    estradiol (ESTRACE) 0.5 MG tablet Take 1 tablet (0.5 mg total) by mouth once daily. 90 tablet 3    famotidine (PEPCID) 20 MG tablet Take 1 tablet (20 mg total) by mouth once daily. As needed for acid reflux 90 tablet 1    fish oil-omega-3 fatty acids 300-1,000 mg capsule Take 2 g by mouth once daily.      loratadine (CLARITIN) 10 mg tablet Take 10 mg by mouth once daily.      losartan (COZAAR) 50 MG tablet Take 1 tablet (50 mg total) by mouth once daily. For blood pressure 90 tablet 1    multivitamin (ONE DAILY MULTIVITAMIN) per tablet Take 1 tablet by mouth once daily.      mupirocin (BACTROBAN) 2 % ointment Apply topically 2 (two) times daily. To area on ear 22 g 1    ondansetron (ZOFRAN) 4 MG tablet Take 1 tablet (4 mg total) by mouth every 8 (eight) hours as needed (Nausea and vomiting). 12 tablet 0    triamterene-hydrochlorothiazide 37.5-25 mg (MAXZIDE-25) 37.5-25 mg per tablet Take 1 tablet by mouth once daily. For blood pressure 90 tablet 1    vitamin D 1000 units Tab Take 1,000 Units by mouth once daily.       No facility-administered encounter medications on file as of 9/1/2020.        Allergies:  Review of patient's allergies indicates:   Allergen Reactions    Codeine Nausea And Vomiting       Health Maintenance:  Immunization History   Administered Date(s) Administered    DT (Pediatric) 01/01/2004    Influenza 12/29/2014    Influenza - Quadrivalent - PF *Preferred* (6 months and older) 12/29/2014    Influenza - Trivalent - PF (ADULT) 12/29/2014    Pneumococcal Conjugate - 13  "Valent 12/29/2014, 05/19/2017    Pneumococcal Polysaccharide - 23 Valent 07/21/2018    Tdap 03/24/2014      Health Maintenance   Topic Date Due    Mammogram  10/24/2020    DEXA SCAN  08/12/2023    TETANUS VACCINE  03/24/2024    Lipid Panel  06/19/2025    Hepatitis C Screening  Completed    Pneumococcal Vaccine (65+ High/Highest Risk)  Completed        Physical Exam      Vital Signs  Pulse: 68  SpO2: 98 %  BP: 128/80  BP Location: Left arm  Patient Position: Sitting  Pain Score:   5  Pain Loc: Face  Height and Weight  Height: 5' 1" (154.9 cm)  Weight: 87.7 kg (193 lb 5.5 oz)  BSA (Calculated - sq m): 1.94 sq meters  BMI (Calculated): 36.6  Weight in (lb) to have BMI = 25: 132]    Physical Exam  Constitutional:       General: She is not in acute distress.     Appearance: She is not ill-appearing, toxic-appearing or diaphoretic.   HENT:      Head:        Comments: R sided oral mucosa: nodule apparent with white appearing calcification at tip of nodule  Eyes:      General: No scleral icterus.     Conjunctiva/sclera: Conjunctivae normal.   Cardiovascular:      Rate and Rhythm: Normal rate and regular rhythm.      Pulses: Normal pulses.      Heart sounds: Normal heart sounds.   Pulmonary:      Effort: Pulmonary effort is normal.      Breath sounds: No wheezing or rales.   Abdominal:      Tenderness: There is no abdominal tenderness.   Lymphadenopathy:      Cervical: No cervical adenopathy.   Neurological:      General: No focal deficit present.      Mental Status: She is alert and oriented to person, place, and time.      Cranial Nerves: No cranial nerve deficit.          Laboratory:  CBC:  Recent Labs   Lab 07/19/18  0855 04/08/19  0947 06/19/20  1743   WBC 7.51 8.04 11.58   RBC 4.83 4.83 5.08   Hemoglobin 13.6 13.6 14.3   Hematocrit 42.9 43.9 47.0   Platelets 256 278 296   Mean Corpuscular Volume 89 91 93   Mean Corpuscular Hemoglobin 28.2 28.2 28.1   Mean Corpuscular Hemoglobin Conc 31.7 L 31.0 L 30.4 L "     CMP:  Recent Labs   Lab 07/19/18  0855  10/25/19  0832 06/19/20  1743   Glucose 110   < > 106 97   Calcium 9.3   < > 9.5 9.7   Albumin 3.7  --  3.8 3.9   Total Protein 7.1  --  7.0 7.8   Sodium 140   < > 141 138   Potassium 4.7   < > 4.4 4.2   CO2 24   < > 24 25   Chloride 107   < > 107 104   BUN, Bld 30 H   < > 32 H 23   Alkaline Phosphatase 76  --  79 98   ALT 18  --  19 21   AST 18  --  21 23   Total Bilirubin 0.5  --  0.5 0.3    < > = values in this interval not displayed.     URINALYSIS:  Recent Labs   Lab 02/11/20  0742  06/19/20  1709   Color, UA yellow   < > Straw   Specific [place], UA  --    < > 1.005   Spec Grav UA 1.010  --   --    pH, UA 6   < > 6.0   Protein, UA  --    < > Negative   Bacteria  --    < > Occasional   Nitrite, UA n   < > Negative   Leukocytes, UA  --    < > Trace A   Urobilinogen, UA n  --   --     < > = values in this interval not displayed.      LIPIDS:  Recent Labs   Lab 07/19/18  0855 10/25/19  0832 06/19/20  1743   HDL 51 55 66   Cholesterol 192 184 200 H   Triglycerides 148 88 120   LDL Cholesterol 111.4 111.4 110.0   Hdl/Cholesterol Ratio 26.6 29.9 33.0   Non-HDL Cholesterol 141 129 134   Total Cholesterol/HDL Ratio 3.8 3.3 3.0     TSH:      A1C:  Recent Labs   Lab 12/15/17  0952 07/19/18  0855 04/08/19  0947 10/25/19  0832 06/19/20  1743   Hemoglobin A1C 5.6 5.9 H 5.8 H 6.0 H 5.9 H       Assessment/Plan     Daisy Severino is a 70 y.o.female with:    1. Shoulder dislocation, recurrent, right  Continue f/u w/ Ortho to address; Pt w/ sling in place today.     2. Abscess of oral tissue  Abscess expressed in clinic with application of pressure to R cheek; pus noted to drain profusely from abscess head located inside Pt's mouth. Given size, notable accompanying cellulitis will treat for 7 days with Augmentin and doxycycline to provide MRSA coverage.   - amoxicillin-clavulanate 875-125mg (AUGMENTIN) 875-125 mg per tablet; Take 1 tablet by mouth 2 (two) times daily. for 7 days   Dispense: 14 tablet; Refill: 0  - doxycycline (DORYX) 100 MG EC tablet; Take 1 tablet (100 mg total) by mouth 2 (two) times daily. for 7 days  Dispense: 14 tablet; Refill: 0  - CBC auto differential; Future      Return to clinic precautions advised if worsening of abscess (enlarging, F/C) or no resolution. Continue supportive care with compress and continued drainage at home.       Pt seen and examined with Dr. Alberto.      Aleta Rao MD  PGY3  Internal Medicine

## 2020-09-02 ENCOUNTER — TELEPHONE (OUTPATIENT)
Dept: INTERNAL MEDICINE | Facility: CLINIC | Age: 70
End: 2020-09-02

## 2020-09-17 ENCOUNTER — PATIENT MESSAGE (OUTPATIENT)
Dept: ADMINISTRATIVE | Facility: HOSPITAL | Age: 70
End: 2020-09-17

## 2020-09-20 NOTE — ADDENDUM NOTE
**Please note: In an effort to reduce hospital traffic during Covid-19 crisis, this is a limited consult note with information gathered from chart review**      Nutrition Assessment   Assessment Type: Initial assessment  Reason for Visit: MST and Registered Dietitian Evaluation  Referral Requested By: Other: RD  Chart Medications Lab Results Reviewed: yes    Nutritional Risk Factors: Unintentional weight loss, Poor PO prior to admission and Low BMI    Current Diet Order: Regular  Diet Tolerance: pending  Food Allergies: no  Priority Points: Status     Demographic/Anthropometrics Information  Gender: female   Patient Age: 56 year old  Height: Height: 5' 6\" (167.6 cm)  Weight: Weight: 51.5 kg  BMI: Body mass index is 18.33 kg/m².    Usual Body Weight: 52.6 1 ear ago per EMR kg  % Weight change: stble x 1 year   Reason for Weight Change: Decreased intake and Other:Etoh abuse, nausea  Weight Classification: Underweight (BMI < 18.5)    Nutrition Diagnosis (PES)  Inadequate oral intake related to Altered GI function/GI disorder and Decreased intake as evidenced by Diet history/recall    Nutrition Plan  Recommended Nutrition Intervention: Coordination of nutrition care by a nutrition professional, Meals and snacks  and nutrition supplemental therapy  Monitor: Biochemical data, medical tests, procedures, Food and beverage intake and Weight    Discharge Needs: Pending  Care Plan Discussed With: Other: RN  Goals: Increase oral intake to >/=50%of meals and supplements  Goal Progress: Initiated  Timeframe to Achieve Goal: 1-3 days    Dietitian Notes/Impressions/Recommendations:  9/20/20: Pt admitted alcohol withdrawal, seizure, nausea, vomiting. Pt receiving IVF and electrolyte replacement, thiamine, folic acid. Ativan for seizure and withdrawal.Nutrition screen per mst triggers poor PO and weight loss pta per pt.Hx chronic alcoholism, polysubstance abuse, anxiety, depression, hepatitis C, pancreatitis, pyelonephritis 2019,  Addended by: SHALA ALBERTS on: 10/16/2018 04:18 PM     Modules accepted: Orders     COPD.  Weight Hx:  Stable x 1 year however likely fluctuates d/t Etoh intake  NFPE: deferred      TREATMENT PLAN: Monitoring & Interventions   1. Monitor PO intake, tolerance, lytes, GI work up.   2. Add ONS Ensure Clear BID now and change to Ensure Enlive once nausea resolves. Add MVI daily. Check mag/phos.  3. RD to follow.       Malnutrition Status: deferred

## 2020-10-05 ENCOUNTER — PATIENT MESSAGE (OUTPATIENT)
Dept: ADMINISTRATIVE | Facility: HOSPITAL | Age: 70
End: 2020-10-05

## 2020-10-07 NOTE — TELEPHONE ENCOUNTER
Left a message for the patient to call the office back.   To reschedule to a virtual visit or a later date     Please Advise  Thank You    
Yes

## 2020-11-30 ENCOUNTER — PATIENT MESSAGE (OUTPATIENT)
Dept: INTERNAL MEDICINE | Facility: CLINIC | Age: 70
End: 2020-11-30

## 2020-12-04 DIAGNOSIS — Z12.11 COLON CANCER SCREENING: ICD-10-CM

## 2020-12-09 ENCOUNTER — HOSPITAL ENCOUNTER (OUTPATIENT)
Dept: RADIOLOGY | Facility: HOSPITAL | Age: 70
Discharge: HOME | End: 2020-12-09
Attending: INTERNAL MEDICINE
Payer: MEDICARE

## 2020-12-09 ENCOUNTER — OFFICE VISIT (OUTPATIENT)
Dept: INTERNAL MEDICINE | Facility: CLINIC | Age: 70
End: 2020-12-09
Payer: MEDICARE

## 2020-12-09 VITALS — WEIGHT: 180 LBS | BODY MASS INDEX: 35.34 KG/M2 | HEIGHT: 60 IN

## 2020-12-09 VITALS
WEIGHT: 193.81 LBS | BODY MASS INDEX: 36.59 KG/M2 | HEART RATE: 84 BPM | DIASTOLIC BLOOD PRESSURE: 70 MMHG | HEIGHT: 61 IN | SYSTOLIC BLOOD PRESSURE: 128 MMHG

## 2020-12-09 DIAGNOSIS — I10 ESSENTIAL HYPERTENSION: ICD-10-CM

## 2020-12-09 DIAGNOSIS — K21.9 GASTROESOPHAGEAL REFLUX DISEASE, UNSPECIFIED WHETHER ESOPHAGITIS PRESENT: ICD-10-CM

## 2020-12-09 DIAGNOSIS — J30.89 ENVIRONMENTAL AND SEASONAL ALLERGIES: ICD-10-CM

## 2020-12-09 DIAGNOSIS — R73.09 ELEVATED HEMOGLOBIN A1C: ICD-10-CM

## 2020-12-09 DIAGNOSIS — E66.01 SEVERE OBESITY (BMI 35.0-39.9) WITH COMORBIDITY: ICD-10-CM

## 2020-12-09 DIAGNOSIS — Z86.19 HISTORY OF HERPES ZOSTER: ICD-10-CM

## 2020-12-09 DIAGNOSIS — Z85.528 HISTORY OF RENAL CARCINOMA: ICD-10-CM

## 2020-12-09 DIAGNOSIS — E78.2 MIXED HYPERLIPIDEMIA: ICD-10-CM

## 2020-12-09 DIAGNOSIS — M47.22 OSTEOARTHRITIS OF SPINE WITH RADICULOPATHY, CERVICAL REGION: ICD-10-CM

## 2020-12-09 DIAGNOSIS — M25.619 LIMITED RANGE OF MOTION (ROM) OF SHOULDER: ICD-10-CM

## 2020-12-09 DIAGNOSIS — M75.101 TEAR OF RIGHT ROTATOR CUFF, UNSPECIFIED TEAR EXTENT, UNSPECIFIED WHETHER TRAUMATIC: ICD-10-CM

## 2020-12-09 DIAGNOSIS — Z12.31 ENCOUNTER FOR SCREENING MAMMOGRAM FOR MALIGNANT NEOPLASM OF BREAST: ICD-10-CM

## 2020-12-09 DIAGNOSIS — M85.80 OSTEOPENIA DETERMINED BY X-RAY: ICD-10-CM

## 2020-12-09 DIAGNOSIS — M19.011 OSTEOARTHRITIS OF RIGHT SHOULDER, UNSPECIFIED OSTEOARTHRITIS TYPE: ICD-10-CM

## 2020-12-09 DIAGNOSIS — Z00.00 ANNUAL PHYSICAL EXAM: Primary | ICD-10-CM

## 2020-12-09 PROCEDURE — 3074F PR MOST RECENT SYSTOLIC BLOOD PRESSURE < 130 MM HG: ICD-10-PCS | Mod: CPTII,S$GLB,, | Performed by: INTERNAL MEDICINE

## 2020-12-09 PROCEDURE — 77067 SCR MAMMO BI INCL CAD: CPT | Mod: TC

## 2020-12-09 PROCEDURE — 3078F DIAST BP <80 MM HG: CPT | Mod: CPTII,S$GLB,, | Performed by: INTERNAL MEDICINE

## 2020-12-09 PROCEDURE — 3074F SYST BP LT 130 MM HG: CPT | Mod: CPTII,S$GLB,, | Performed by: INTERNAL MEDICINE

## 2020-12-09 PROCEDURE — 3078F PR MOST RECENT DIASTOLIC BLOOD PRESSURE < 80 MM HG: ICD-10-PCS | Mod: CPTII,S$GLB,, | Performed by: INTERNAL MEDICINE

## 2020-12-09 PROCEDURE — 77067 MAMMO DIGITAL SCREENING BILAT WITH TOMOSYNTHESIS_CAD: ICD-10-PCS | Mod: 26,,, | Performed by: RADIOLOGY

## 2020-12-09 PROCEDURE — 3008F BODY MASS INDEX DOCD: CPT | Mod: CPTII,S$GLB,, | Performed by: INTERNAL MEDICINE

## 2020-12-09 PROCEDURE — 1126F AMNT PAIN NOTED NONE PRSNT: CPT | Mod: S$GLB,,, | Performed by: INTERNAL MEDICINE

## 2020-12-09 PROCEDURE — 1126F PR PAIN SEVERITY QUANTIFIED, NO PAIN PRESENT: ICD-10-PCS | Mod: S$GLB,,, | Performed by: INTERNAL MEDICINE

## 2020-12-09 PROCEDURE — 99999 PR PBB SHADOW E&M-EST. PATIENT-LVL V: CPT | Mod: PBBFAC,,, | Performed by: INTERNAL MEDICINE

## 2020-12-09 PROCEDURE — 99999 PR PBB SHADOW E&M-EST. PATIENT-LVL V: ICD-10-PCS | Mod: PBBFAC,,, | Performed by: INTERNAL MEDICINE

## 2020-12-09 PROCEDURE — 99397 PR PREVENTIVE VISIT,EST,65 & OVER: ICD-10-PCS | Mod: S$GLB,,, | Performed by: INTERNAL MEDICINE

## 2020-12-09 PROCEDURE — 77067 SCR MAMMO BI INCL CAD: CPT | Mod: 26,,, | Performed by: RADIOLOGY

## 2020-12-09 PROCEDURE — 3008F PR BODY MASS INDEX (BMI) DOCUMENTED: ICD-10-PCS | Mod: CPTII,S$GLB,, | Performed by: INTERNAL MEDICINE

## 2020-12-09 PROCEDURE — 77063 MAMMO DIGITAL SCREENING BILAT WITH TOMOSYNTHESIS_CAD: ICD-10-PCS | Mod: 26,,, | Performed by: RADIOLOGY

## 2020-12-09 PROCEDURE — 99397 PER PM REEVAL EST PAT 65+ YR: CPT | Mod: S$GLB,,, | Performed by: INTERNAL MEDICINE

## 2020-12-09 PROCEDURE — 77063 BREAST TOMOSYNTHESIS BI: CPT | Mod: 26,,, | Performed by: RADIOLOGY

## 2020-12-09 NOTE — PROGRESS NOTES
Subjective:       Patient ID: Daisy Severino is a 70 y.o. female.    Chief Complaint: Follow-up    HPI  71 y/o woman with HTN, HLD, borderline DM, fatty liver, GERD, back pain, h/o renal carcinoma here for annual exam.    Not currently working but looking for a job that is better suited to what she can do -- ideally requiring less physical labor.    Seen in Sept for oral abscess; this has healed without recurrence.    HTN - taking losartan 50mg, triamterene-HCTZ 37.5-35mg.    H/o HLD -Taking fish oil / omega 3 supplement.     Borderline DM - last A1c 5.9 in 6/2020  Checks at home - highest around 120s if eating more carbs    GERD - takes famotidine usually once a day which controls symptoms    H/o renal cell carcinoma - nephrectomy 4/2013 with Dr Stepan Ramirez  No changes in urination    Neck/shoulder pain - saw Sports Med here then followed with outside Ortho for this (Dr Smith, Dr Steele previously). R shoulder dislocation twice this spring in 3/2019, possible nondisplaced acromial fracture. +rotator cuff tear (supraspinatus, biceps, glenohumeral ligament), +arthritis of joint.  Saw Dr Smith in October - was told the only next intervention would be shoulder replacement, but only recommended if she was having further loss of function or significant increase in pain. Has some limitation of ROM but is careful; pain is manageable, and she is able to do what she wants to do.  Using voltaren gel on shoulder, knees which helps  Takes hydrocodone from Dr Smith - 1/2 tab rarely.    Chronic allergies, h/o vertigo - referred to ENT & allergy, has taken valium + meclizine in the past for this.     H/o zoster in L eye previously, has also had this in her ear years ago.  Follows with eye doctor at [facility], has dry eyes, uses restasis.  Concerned about getting shingrix vaccine, says she was told not to get this due to risk of setting off an episode?    H/o renal carcinoma - diagnosed with ultrasound and follow-up CT, stage  "I carcinoma. Had complete nephrectomy 4/2013 with Dr Stepan Ramirez; surgery and post-op course complicated with ICU stay, collapsed lung.    DEXA done this year, osteopenia, no prescription medication recommended  FIT negative 12/9/20  Mammogram done today   Follows with Dr Lugo for Gyn - on estradiol  Urine test done today  Declines flu vaccine today    Review of Systems   Constitutional: Negative for activity change, fever and unexpected weight change.   HENT: Negative.    Eyes: Negative for visual disturbance.   Respiratory: Negative for cough and shortness of breath.    Cardiovascular: Negative for chest pain and leg swelling.   Gastrointestinal: Negative.  Negative for abdominal pain.   Endocrine: Negative.    Genitourinary: Negative.  Negative for dysuria.   Musculoskeletal: Positive for arthralgias. Negative for gait problem and joint swelling.   Skin: Negative for rash.   Neurological: Negative for syncope and weakness.   Psychiatric/Behavioral: Negative for dysphoric mood. The patient is not nervous/anxious.          Past medical history, surgical history, and family medical history reviewed and updated as appropriate.    Medications and allergies reviewed.     Objective:          Vitals:    12/09/20 1549   BP: 128/70   BP Location: Left arm   Patient Position: Sitting   BP Method: Large (Manual)   Pulse: 84   Weight: 87.9 kg (193 lb 12.6 oz)   Height: 5' 1" (1.549 m)     Body mass index is 36.62 kg/m².  Physical Exam  Vitals signs reviewed.   Constitutional:       General: She is not in acute distress.     Appearance: Normal appearance.   HENT:      Head: Normocephalic and atraumatic.      Mouth/Throat:      Mouth: Mucous membranes are moist.   Eyes:      Extraocular Movements: Extraocular movements intact.      Conjunctiva/sclera: Conjunctivae normal.      Pupils: Pupils are equal, round, and reactive to light.   Neck:      Musculoskeletal: Neck supple.   Cardiovascular:      Rate and Rhythm: Normal " rate and regular rhythm.      Heart sounds: No murmur.   Pulmonary:      Effort: Pulmonary effort is normal. No respiratory distress.      Breath sounds: Normal breath sounds.   Abdominal:      General: There is no distension.      Palpations: Abdomen is soft.      Tenderness: There is no abdominal tenderness.   Musculoskeletal:         General: No swelling, tenderness or deformity.      Right lower leg: No edema.      Left lower leg: No edema.      Comments: Limited AROM of R shoulder   Lymphadenopathy:      Cervical: No cervical adenopathy.   Skin:     General: Skin is warm and dry.      Findings: No erythema.   Neurological:      General: No focal deficit present.      Mental Status: She is alert and oriented to person, place, and time. Mental status is at baseline.      Gait: Gait normal.   Psychiatric:         Mood and Affect: Mood normal.         Behavior: Behavior normal.         Thought Content: Thought content normal.         Lab Results   Component Value Date    WBC 12.12 09/01/2020    HGB 13.9 09/01/2020    HCT 46.7 09/01/2020     09/01/2020    CHOL 200 (H) 06/19/2020    TRIG 120 06/19/2020    HDL 66 06/19/2020    ALT 21 06/19/2020    AST 23 06/19/2020     06/19/2020    K 4.2 06/19/2020     06/19/2020    CREATININE 1.1 06/19/2020    BUN 23 06/19/2020    CO2 25 06/19/2020    HGBA1C 5.9 (H) 06/19/2020       Assessment:       1. Annual physical exam    2. Essential hypertension    3. Elevated hemoglobin A1c    4. History of renal carcinoma    5. Gastroesophageal reflux disease, unspecified whether esophagitis present    6. Tear of right rotator cuff, unspecified tear extent, unspecified whether traumatic    7. Limited range of motion (ROM) of shoulder    8. Osteoarthritis of right shoulder, unspecified osteoarthritis type    9. Osteoarthritis of spine with radiculopathy, cervical region    10. Mixed hyperlipidemia    11. History of herpes zoster    12. Severe obesity (BMI 35.0-39.9) with  comorbidity    13. Environmental and seasonal allergies        Plan:   Daisy was seen today for follow-up.    Diagnoses and all orders for this visit:    Annual physical exam  Overall stable / healthy, doing well. Reviewed chronic and preventive health concerns.    Essential hypertension  -     Comprehensive Metabolic Panel; Future  At goal, no changes to medications, will monitor    Elevated hemoglobin A1c  -     CBC Without Differential; Future  -     Hemoglobin A1C; Future  Stable in low end of borderline range, continue working on low-carb diet and regular exercise. Would like to not need a medication.    History of renal carcinoma  -     CBC Without Differential; Future  -     Comprehensive Metabolic Panel; Future  Monitoring renal function  No urinary changes    Gastroesophageal reflux disease, unspecified whether esophagitis present  Continue famotidine    Tear of right rotator cuff, unspecified tear extent, unspecified whether traumatic  Limited range of motion (ROM) of shoulder  Comments:  right side  Osteoarthritis of right shoulder, unspecified osteoarthritis type  Osteoarthritis of spine with radiculopathy, cervical region  Reviewed, as noted. Following with Dr Smith  Can try topical magnesium for muscle spasm/twitching    Mixed hyperlipidemia  Borderline for being recommended to take statin; would like to not add more medications if possible. She is working on following healthier low-carb diet  Encouraged exercise    History of herpes zoster  Recommended check with eye doctor about whether that caution re: not getting shingles vaccine applies to shingrix or just zostavax. Shingrix should not set off an episode.    Severe obesity (BMI 35.0-39.9) with comorbidity  Encouraged working on lower-carb diet, gradually increasing exercise    Environmental and seasonal allergies  Stable at present, continue current regimen    Health maintenance reviewed with patient.   Plan for labs in the next 1-2  months    Follow up in about 6 months (around 6/9/2021) for hypertension, follow up.    Hammad Ware MD  Internal Medicine  [facility]  12/9/2020

## 2020-12-09 NOTE — PATIENT INSTRUCTIONS
Schedule your labs for January or end of December.    Can try a topical magnesium cream for the muscle twitching and soreness in your arm & shoulder.    Check in with your eye doctor about whether you should get the *new* shingles vaccine (Shingrix) -- see below for information on this.    There is a new shingles vaccine available (Shingrix) that is both safer and more effective than the old shingles vaccine (Zostavax). I do recommend that you get this, as it can help prevent a shingles outbreak even if you have had one in the past.   For this vaccine, you will need a series of 2 shots, first one and then a second 2 to 6 months later.  Please check in with the [facility] or with your local pharmacy about getting this vaccine; they should be able to check on insurance coverage and whether there is any cost to you.

## 2020-12-11 ENCOUNTER — PATIENT MESSAGE (OUTPATIENT)
Dept: OTHER | Facility: OTHER | Age: 70
End: 2020-12-11

## 2020-12-12 PROBLEM — M25.619 LIMITED RANGE OF MOTION (ROM) OF SHOULDER: Status: ACTIVE | Noted: 2020-12-12

## 2020-12-12 PROBLEM — J30.89 ENVIRONMENTAL AND SEASONAL ALLERGIES: Status: ACTIVE | Noted: 2020-12-12

## 2020-12-12 PROBLEM — M19.011 OSTEOARTHRITIS OF RIGHT SHOULDER: Status: ACTIVE | Noted: 2020-12-12

## 2020-12-31 ENCOUNTER — TELEPHONE (OUTPATIENT)
Dept: OBSTETRICS AND GYNECOLOGY | Facility: CLINIC | Age: 70
End: 2020-12-31

## 2020-12-31 RX ORDER — ESTRADIOL 0.5 MG/1
0.5 TABLET ORAL DAILY
Qty: 90 TABLET | Refills: 0 | Status: SHIPPED | OUTPATIENT
Start: 2020-12-31 | End: 2021-04-20 | Stop reason: SDUPTHER

## 2020-12-31 NOTE — TELEPHONE ENCOUNTER
----- Message from Franky Laboy sent at 12/31/2020 12:17 PM CST -----  Patient called in requesting to speak with you. Patient prefers to speak with a nurse. Patient has questions about when her next visit should be and some medications. Please advise.  [phone]

## 2021-01-16 ENCOUNTER — PATIENT MESSAGE (OUTPATIENT)
Dept: INTERNAL MEDICINE | Facility: CLINIC | Age: 71
End: 2021-01-16

## 2021-02-13 DIAGNOSIS — K21.9 GASTROESOPHAGEAL REFLUX DISEASE: ICD-10-CM

## 2021-02-15 RX ORDER — FAMOTIDINE 20 MG/1
20 TABLET, FILM COATED ORAL DAILY
Qty: 90 TABLET | Refills: 3 | Status: SHIPPED | OUTPATIENT
Start: 2021-02-15 | End: 2022-03-19 | Stop reason: SDUPTHER

## 2021-02-23 ENCOUNTER — PATIENT OUTREACH (OUTPATIENT)
Dept: ADMINISTRATIVE | Facility: OTHER | Age: 71
End: 2021-02-23

## 2021-04-14 ENCOUNTER — PATIENT OUTREACH (OUTPATIENT)
Dept: ADMINISTRATIVE | Facility: OTHER | Age: 71
End: 2021-04-14

## 2021-04-20 RX ORDER — ESTRADIOL 0.5 MG/1
0.5 TABLET ORAL DAILY
Qty: 90 TABLET | Refills: 0 | Status: SHIPPED | OUTPATIENT
Start: 2021-04-20 | End: 2021-07-26

## 2021-05-10 ENCOUNTER — OFFICE VISIT (OUTPATIENT)
Dept: OBSTETRICS AND GYNECOLOGY | Facility: CLINIC | Age: 71
End: 2021-05-10
Payer: MEDICARE

## 2021-05-10 VITALS
WEIGHT: 201.63 LBS | BODY MASS INDEX: 38.09 KG/M2 | SYSTOLIC BLOOD PRESSURE: 116 MMHG | DIASTOLIC BLOOD PRESSURE: 70 MMHG

## 2021-05-10 DIAGNOSIS — Z12.4 CERVICAL CANCER SCREENING: ICD-10-CM

## 2021-05-10 DIAGNOSIS — Z01.419 WELL WOMAN EXAM WITH ROUTINE GYNECOLOGICAL EXAM: Primary | ICD-10-CM

## 2021-05-10 PROCEDURE — 3288F FALL RISK ASSESSMENT DOCD: CPT | Mod: CPTII,S$GLB,, | Performed by: OBSTETRICS & GYNECOLOGY

## 2021-05-10 PROCEDURE — 3288F PR FALLS RISK ASSESSMENT DOCUMENTED: ICD-10-PCS | Mod: CPTII,S$GLB,, | Performed by: OBSTETRICS & GYNECOLOGY

## 2021-05-10 PROCEDURE — 99999 PR PBB SHADOW E&M-EST. PATIENT-LVL III: CPT | Mod: PBBFAC,,, | Performed by: OBSTETRICS & GYNECOLOGY

## 2021-05-10 PROCEDURE — 1101F PR PT FALLS ASSESS DOC 0-1 FALLS W/OUT INJ PAST YR: ICD-10-PCS | Mod: CPTII,S$GLB,, | Performed by: OBSTETRICS & GYNECOLOGY

## 2021-05-10 PROCEDURE — 3008F BODY MASS INDEX DOCD: CPT | Mod: CPTII,S$GLB,, | Performed by: OBSTETRICS & GYNECOLOGY

## 2021-05-10 PROCEDURE — G0101 CA SCREEN;PELVIC/BREAST EXAM: HCPCS | Mod: S$GLB,,, | Performed by: OBSTETRICS & GYNECOLOGY

## 2021-05-10 PROCEDURE — 88175 CYTOPATH C/V AUTO FLUID REDO: CPT | Performed by: OBSTETRICS & GYNECOLOGY

## 2021-05-10 PROCEDURE — G0101 PR CA SCREEN;PELVIC/BREAST EXAM: ICD-10-PCS | Mod: S$GLB,,, | Performed by: OBSTETRICS & GYNECOLOGY

## 2021-05-10 PROCEDURE — 99999 PR PBB SHADOW E&M-EST. PATIENT-LVL III: ICD-10-PCS | Mod: PBBFAC,,, | Performed by: OBSTETRICS & GYNECOLOGY

## 2021-05-10 PROCEDURE — 1101F PT FALLS ASSESS-DOCD LE1/YR: CPT | Mod: CPTII,S$GLB,, | Performed by: OBSTETRICS & GYNECOLOGY

## 2021-05-10 PROCEDURE — 3008F PR BODY MASS INDEX (BMI) DOCUMENTED: ICD-10-PCS | Mod: CPTII,S$GLB,, | Performed by: OBSTETRICS & GYNECOLOGY

## 2021-05-10 RX ORDER — OMEPRAZOLE 40 MG/1
CAPSULE, DELAYED RELEASE ORAL
COMMUNITY
Start: 2021-04-28 | End: 2021-08-04

## 2021-05-10 RX ORDER — CEFDINIR 300 MG/1
CAPSULE ORAL
COMMUNITY
Start: 2021-04-28 | End: 2021-08-04

## 2021-05-10 RX ORDER — AZITHROMYCIN 250 MG/1
TABLET, FILM COATED ORAL
COMMUNITY
Start: 2021-05-05 | End: 2021-08-04

## 2021-05-14 LAB
CLINICAL INFO: NORMAL
CYTO CVX: NORMAL
CYTOLOGIST CVX/VAG CYTO: NORMAL
CYTOLOGIST CVX/VAG CYTO: NORMAL
CYTOLOGY CMNT CVX/VAG CYTO-IMP: NORMAL
CYTOLOGY PAP THIN PREP EXPLANATION: NORMAL
DATE OF PREVIOUS PAP: NORMAL
DATE PREVIOUS BX: NO
LMP START DATE: NORMAL
SPECIMEN SOURCE CVX/VAG CYTO: NORMAL
STAT OF ADQ CVX/VAG CYTO-IMP: NORMAL

## 2021-06-17 ENCOUNTER — OFFICE VISIT (OUTPATIENT)
Dept: INTERNAL MEDICINE | Facility: CLINIC | Age: 71
End: 2021-06-17
Payer: MEDICARE

## 2021-06-17 VITALS
TEMPERATURE: 98 F | WEIGHT: 199.06 LBS | BODY MASS INDEX: 39.08 KG/M2 | OXYGEN SATURATION: 98 % | RESPIRATION RATE: 16 BRPM | DIASTOLIC BLOOD PRESSURE: 72 MMHG | HEART RATE: 63 BPM | SYSTOLIC BLOOD PRESSURE: 118 MMHG | HEIGHT: 60 IN

## 2021-06-17 DIAGNOSIS — Z00.00 ENCOUNTER FOR HEALTH MAINTENANCE EXAMINATION: ICD-10-CM

## 2021-06-17 DIAGNOSIS — Z85.528 HISTORY OF RENAL CARCINOMA: ICD-10-CM

## 2021-06-17 DIAGNOSIS — E66.01 SEVERE OBESITY (BMI 35.0-39.9) WITH COMORBIDITY: ICD-10-CM

## 2021-06-17 DIAGNOSIS — E78.2 MIXED HYPERLIPIDEMIA: ICD-10-CM

## 2021-06-17 DIAGNOSIS — J32.9 SINUSITIS, UNSPECIFIED CHRONICITY, UNSPECIFIED LOCATION: Primary | ICD-10-CM

## 2021-06-17 DIAGNOSIS — M85.80 OSTEOPENIA DETERMINED BY X-RAY: ICD-10-CM

## 2021-06-17 DIAGNOSIS — R21 RASH: ICD-10-CM

## 2021-06-17 DIAGNOSIS — J30.89 ENVIRONMENTAL AND SEASONAL ALLERGIES: ICD-10-CM

## 2021-06-17 DIAGNOSIS — R73.09 ELEVATED HEMOGLOBIN A1C: ICD-10-CM

## 2021-06-17 DIAGNOSIS — K21.9 GASTROESOPHAGEAL REFLUX DISEASE, UNSPECIFIED WHETHER ESOPHAGITIS PRESENT: ICD-10-CM

## 2021-06-17 DIAGNOSIS — Z13.6 ENCOUNTER FOR LIPID SCREENING FOR CARDIOVASCULAR DISEASE: ICD-10-CM

## 2021-06-17 DIAGNOSIS — I10 ESSENTIAL HYPERTENSION: ICD-10-CM

## 2021-06-17 DIAGNOSIS — Z13.220 ENCOUNTER FOR LIPID SCREENING FOR CARDIOVASCULAR DISEASE: ICD-10-CM

## 2021-06-17 PROCEDURE — 1101F PT FALLS ASSESS-DOCD LE1/YR: CPT | Mod: CPTII,S$GLB,, | Performed by: INTERNAL MEDICINE

## 2021-06-17 PROCEDURE — 99214 PR OFFICE/OUTPT VISIT, EST, LEVL IV, 30-39 MIN: ICD-10-PCS | Mod: S$GLB,,, | Performed by: INTERNAL MEDICINE

## 2021-06-17 PROCEDURE — 1126F AMNT PAIN NOTED NONE PRSNT: CPT | Mod: S$GLB,,, | Performed by: INTERNAL MEDICINE

## 2021-06-17 PROCEDURE — 3008F BODY MASS INDEX DOCD: CPT | Mod: CPTII,S$GLB,, | Performed by: INTERNAL MEDICINE

## 2021-06-17 PROCEDURE — 1159F PR MEDICATION LIST DOCUMENTED IN MEDICAL RECORD: ICD-10-PCS | Mod: S$GLB,,, | Performed by: INTERNAL MEDICINE

## 2021-06-17 PROCEDURE — 99214 OFFICE O/P EST MOD 30 MIN: CPT | Mod: S$GLB,,, | Performed by: INTERNAL MEDICINE

## 2021-06-17 PROCEDURE — 1159F MED LIST DOCD IN RCRD: CPT | Mod: S$GLB,,, | Performed by: INTERNAL MEDICINE

## 2021-06-17 PROCEDURE — 3288F FALL RISK ASSESSMENT DOCD: CPT | Mod: CPTII,S$GLB,, | Performed by: INTERNAL MEDICINE

## 2021-06-17 PROCEDURE — 99999 PR PBB SHADOW E&M-EST. PATIENT-LVL V: CPT | Mod: PBBFAC,,, | Performed by: INTERNAL MEDICINE

## 2021-06-17 PROCEDURE — 99999 PR PBB SHADOW E&M-EST. PATIENT-LVL V: ICD-10-PCS | Mod: PBBFAC,,, | Performed by: INTERNAL MEDICINE

## 2021-06-17 PROCEDURE — 1126F PR PAIN SEVERITY QUANTIFIED, NO PAIN PRESENT: ICD-10-PCS | Mod: S$GLB,,, | Performed by: INTERNAL MEDICINE

## 2021-06-17 PROCEDURE — 1101F PR PT FALLS ASSESS DOC 0-1 FALLS W/OUT INJ PAST YR: ICD-10-PCS | Mod: CPTII,S$GLB,, | Performed by: INTERNAL MEDICINE

## 2021-06-17 PROCEDURE — 3288F PR FALLS RISK ASSESSMENT DOCUMENTED: ICD-10-PCS | Mod: CPTII,S$GLB,, | Performed by: INTERNAL MEDICINE

## 2021-06-17 PROCEDURE — 3008F PR BODY MASS INDEX (BMI) DOCUMENTED: ICD-10-PCS | Mod: CPTII,S$GLB,, | Performed by: INTERNAL MEDICINE

## 2021-06-18 ENCOUNTER — LAB VISIT (OUTPATIENT)
Dept: LAB | Facility: HOSPITAL | Age: 71
End: 2021-06-18
Attending: INTERNAL MEDICINE
Payer: MEDICARE

## 2021-06-18 DIAGNOSIS — Z13.6 ENCOUNTER FOR LIPID SCREENING FOR CARDIOVASCULAR DISEASE: ICD-10-CM

## 2021-06-18 DIAGNOSIS — Z13.220 ENCOUNTER FOR LIPID SCREENING FOR CARDIOVASCULAR DISEASE: ICD-10-CM

## 2021-06-18 DIAGNOSIS — Z00.00 ENCOUNTER FOR HEALTH MAINTENANCE EXAMINATION: ICD-10-CM

## 2021-06-18 DIAGNOSIS — R73.09 ELEVATED HEMOGLOBIN A1C: ICD-10-CM

## 2021-06-18 DIAGNOSIS — E66.01 SEVERE OBESITY (BMI 35.0-39.9) WITH COMORBIDITY: ICD-10-CM

## 2021-06-18 DIAGNOSIS — M85.80 OSTEOPENIA DETERMINED BY X-RAY: ICD-10-CM

## 2021-06-18 LAB
25(OH)D3+25(OH)D2 SERPL-MCNC: 30 NG/ML (ref 30–96)
ALBUMIN SERPL BCP-MCNC: 3.7 G/DL (ref 3.5–5.2)
ALP SERPL-CCNC: 93 U/L (ref 55–135)
ALT SERPL W/O P-5'-P-CCNC: 17 U/L (ref 10–44)
ANION GAP SERPL CALC-SCNC: 11 MMOL/L (ref 8–16)
AST SERPL-CCNC: 17 U/L (ref 10–40)
BASOPHILS # BLD AUTO: 0.07 K/UL (ref 0–0.2)
BASOPHILS NFR BLD: 1 % (ref 0–1.9)
BILIRUB SERPL-MCNC: 0.4 MG/DL (ref 0.1–1)
BUN SERPL-MCNC: 24 MG/DL (ref 8–23)
CALCIUM SERPL-MCNC: 9.9 MG/DL (ref 8.7–10.5)
CHLORIDE SERPL-SCNC: 105 MMOL/L (ref 95–110)
CHOLEST SERPL-MCNC: 210 MG/DL (ref 120–199)
CHOLEST/HDLC SERPL: 4 {RATIO} (ref 2–5)
CO2 SERPL-SCNC: 24 MMOL/L (ref 23–29)
CREAT SERPL-MCNC: 1.1 MG/DL (ref 0.5–1.4)
DIFFERENTIAL METHOD: ABNORMAL
EOSINOPHIL # BLD AUTO: 0.2 K/UL (ref 0–0.5)
EOSINOPHIL NFR BLD: 2 % (ref 0–8)
ERYTHROCYTE [DISTWIDTH] IN BLOOD BY AUTOMATED COUNT: 13.7 % (ref 11.5–14.5)
EST. GFR  (AFRICAN AMERICAN): 59.2 ML/MIN/1.73 M^2
EST. GFR  (NON AFRICAN AMERICAN): 51.3 ML/MIN/1.73 M^2
ESTIMATED AVG GLUCOSE: 123 MG/DL (ref 68–131)
GLUCOSE SERPL-MCNC: 109 MG/DL (ref 70–110)
HBA1C MFR BLD: 5.9 % (ref 4–5.6)
HCT VFR BLD AUTO: 44.1 % (ref 37–48.5)
HDLC SERPL-MCNC: 52 MG/DL (ref 40–75)
HDLC SERPL: 24.8 % (ref 20–50)
HGB BLD-MCNC: 13.6 G/DL (ref 12–16)
IMM GRANULOCYTES # BLD AUTO: 0.03 K/UL (ref 0–0.04)
IMM GRANULOCYTES NFR BLD AUTO: 0.4 % (ref 0–0.5)
LDLC SERPL CALC-MCNC: 133 MG/DL (ref 63–159)
LYMPHOCYTES # BLD AUTO: 1.9 K/UL (ref 1–4.8)
LYMPHOCYTES NFR BLD: 26 % (ref 18–48)
MCH RBC QN AUTO: 27.6 PG (ref 27–31)
MCHC RBC AUTO-ENTMCNC: 30.8 G/DL (ref 32–36)
MCV RBC AUTO: 90 FL (ref 82–98)
MONOCYTES # BLD AUTO: 0.6 K/UL (ref 0.3–1)
MONOCYTES NFR BLD: 8.3 % (ref 4–15)
NEUTROPHILS # BLD AUTO: 4.6 K/UL (ref 1.8–7.7)
NEUTROPHILS NFR BLD: 62.3 % (ref 38–73)
NONHDLC SERPL-MCNC: 158 MG/DL
NRBC BLD-RTO: 0 /100 WBC
PLATELET # BLD AUTO: 272 K/UL (ref 150–450)
PMV BLD AUTO: 10.7 FL (ref 9.2–12.9)
POTASSIUM SERPL-SCNC: 4.2 MMOL/L (ref 3.5–5.1)
PROT SERPL-MCNC: 7.2 G/DL (ref 6–8.4)
RBC # BLD AUTO: 4.93 M/UL (ref 4–5.4)
SODIUM SERPL-SCNC: 140 MMOL/L (ref 136–145)
TRIGL SERPL-MCNC: 125 MG/DL (ref 30–150)
WBC # BLD AUTO: 7.36 K/UL (ref 3.9–12.7)

## 2021-06-18 PROCEDURE — 82306 VITAMIN D 25 HYDROXY: CPT | Performed by: INTERNAL MEDICINE

## 2021-06-18 PROCEDURE — 80061 LIPID PANEL: CPT | Performed by: INTERNAL MEDICINE

## 2021-06-18 PROCEDURE — 85025 COMPLETE CBC W/AUTO DIFF WBC: CPT | Performed by: INTERNAL MEDICINE

## 2021-06-18 PROCEDURE — 83036 HEMOGLOBIN GLYCOSYLATED A1C: CPT | Performed by: INTERNAL MEDICINE

## 2021-06-18 PROCEDURE — 36415 COLL VENOUS BLD VENIPUNCTURE: CPT | Performed by: INTERNAL MEDICINE

## 2021-06-18 PROCEDURE — 80053 COMPREHEN METABOLIC PANEL: CPT | Performed by: INTERNAL MEDICINE

## 2021-06-19 ENCOUNTER — PATIENT MESSAGE (OUTPATIENT)
Dept: INTERNAL MEDICINE | Facility: CLINIC | Age: 71
End: 2021-06-19

## 2021-06-21 ENCOUNTER — IMMUNIZATION (OUTPATIENT)
Dept: INTERNAL MEDICINE | Facility: CLINIC | Age: 71
End: 2021-06-21
Payer: MEDICARE

## 2021-06-21 DIAGNOSIS — Z23 NEED FOR VACCINATION: Primary | ICD-10-CM

## 2021-06-21 PROCEDURE — 91300 COVID-19, MRNA, LNP-S, PF, 30 MCG/0.3 ML DOSE VACCINE: CPT | Mod: PBBFAC | Performed by: INTERNAL MEDICINE

## 2021-06-22 ENCOUNTER — PATIENT MESSAGE (OUTPATIENT)
Dept: INTERNAL MEDICINE | Facility: CLINIC | Age: 71
End: 2021-06-22

## 2021-06-30 ENCOUNTER — PATIENT MESSAGE (OUTPATIENT)
Dept: INTERNAL MEDICINE | Facility: CLINIC | Age: 71
End: 2021-06-30

## 2021-06-30 DIAGNOSIS — E78.2 MIXED HYPERLIPIDEMIA: Primary | ICD-10-CM

## 2021-06-30 DIAGNOSIS — I10 ESSENTIAL HYPERTENSION: ICD-10-CM

## 2021-06-30 RX ORDER — TRIAMTERENE/HYDROCHLOROTHIAZID 37.5-25 MG
1 TABLET ORAL DAILY
Qty: 90 TABLET | Refills: 1 | Status: SHIPPED | OUTPATIENT
Start: 2021-06-30 | End: 2022-07-14 | Stop reason: SDUPTHER

## 2021-06-30 RX ORDER — LOSARTAN POTASSIUM 50 MG/1
50 TABLET ORAL DAILY
Qty: 90 TABLET | Refills: 1 | Status: SHIPPED | OUTPATIENT
Start: 2021-06-30 | End: 2022-07-14 | Stop reason: SDUPTHER

## 2021-06-30 RX ORDER — ATORVASTATIN CALCIUM 20 MG/1
20 TABLET, FILM COATED ORAL DAILY
Qty: 90 TABLET | Refills: 1 | Status: SHIPPED | OUTPATIENT
Start: 2021-06-30 | End: 2021-08-04

## 2021-07-01 ENCOUNTER — PATIENT MESSAGE (OUTPATIENT)
Dept: INTERNAL MEDICINE | Facility: CLINIC | Age: 71
End: 2021-07-01

## 2021-07-08 ENCOUNTER — PATIENT MESSAGE (OUTPATIENT)
Dept: INTERNAL MEDICINE | Facility: CLINIC | Age: 71
End: 2021-07-08

## 2021-07-15 ENCOUNTER — IMMUNIZATION (OUTPATIENT)
Dept: INTERNAL MEDICINE | Facility: CLINIC | Age: 71
End: 2021-07-15
Payer: MEDICARE

## 2021-07-15 DIAGNOSIS — Z23 NEED FOR VACCINATION: Primary | ICD-10-CM

## 2021-07-15 PROCEDURE — 0002A COVID-19, MRNA, LNP-S, PF, 30 MCG/0.3 ML DOSE VACCINE: CPT | Mod: PBBFAC | Performed by: INTERNAL MEDICINE

## 2021-07-15 PROCEDURE — 91300 COVID-19, MRNA, LNP-S, PF, 30 MCG/0.3 ML DOSE VACCINE: CPT | Mod: PBBFAC | Performed by: INTERNAL MEDICINE

## 2021-08-04 ENCOUNTER — OFFICE VISIT (OUTPATIENT)
Dept: INTERNAL MEDICINE | Facility: CLINIC | Age: 71
End: 2021-08-04
Payer: MEDICARE

## 2021-08-04 VITALS
BODY MASS INDEX: 39.08 KG/M2 | DIASTOLIC BLOOD PRESSURE: 72 MMHG | HEIGHT: 60 IN | SYSTOLIC BLOOD PRESSURE: 130 MMHG | HEART RATE: 80 BPM | OXYGEN SATURATION: 97 % | WEIGHT: 199.06 LBS

## 2021-08-04 DIAGNOSIS — M54.2 NECK AND SHOULDER PAIN: ICD-10-CM

## 2021-08-04 DIAGNOSIS — Z85.528: ICD-10-CM

## 2021-08-04 DIAGNOSIS — E78.2 MIXED HYPERLIPIDEMIA: ICD-10-CM

## 2021-08-04 DIAGNOSIS — M54.42 CHRONIC LEFT-SIDED LOW BACK PAIN WITH LEFT-SIDED SCIATICA: ICD-10-CM

## 2021-08-04 DIAGNOSIS — E66.01 SEVERE OBESITY (BMI 35.0-39.9) WITH COMORBIDITY: ICD-10-CM

## 2021-08-04 DIAGNOSIS — I10 ESSENTIAL HYPERTENSION: ICD-10-CM

## 2021-08-04 DIAGNOSIS — M25.519 NECK AND SHOULDER PAIN: ICD-10-CM

## 2021-08-04 DIAGNOSIS — M19.011 OSTEOARTHRITIS OF RIGHT SHOULDER, UNSPECIFIED OSTEOARTHRITIS TYPE: ICD-10-CM

## 2021-08-04 DIAGNOSIS — M85.80 OSTEOPENIA DETERMINED BY X-RAY: ICD-10-CM

## 2021-08-04 DIAGNOSIS — Z78.0 POSTMENOPAUSAL: ICD-10-CM

## 2021-08-04 DIAGNOSIS — Z00.00 VISIT FOR ANNUAL HEALTH EXAMINATION: Primary | ICD-10-CM

## 2021-08-04 DIAGNOSIS — G89.29 CHRONIC LEFT-SIDED LOW BACK PAIN WITH LEFT-SIDED SCIATICA: ICD-10-CM

## 2021-08-04 PROBLEM — C64.9: Status: ACTIVE | Noted: 2021-08-04

## 2021-08-04 PROCEDURE — 3288F PR FALLS RISK ASSESSMENT DOCUMENTED: ICD-10-PCS | Mod: CPTII,S$GLB,, | Performed by: INTERNAL MEDICINE

## 2021-08-04 PROCEDURE — 3008F PR BODY MASS INDEX (BMI) DOCUMENTED: ICD-10-PCS | Mod: CPTII,S$GLB,, | Performed by: INTERNAL MEDICINE

## 2021-08-04 PROCEDURE — 1159F MED LIST DOCD IN RCRD: CPT | Mod: CPTII,S$GLB,, | Performed by: INTERNAL MEDICINE

## 2021-08-04 PROCEDURE — 99214 PR OFFICE/OUTPT VISIT, EST, LEVL IV, 30-39 MIN: ICD-10-PCS | Mod: S$GLB,,, | Performed by: INTERNAL MEDICINE

## 2021-08-04 PROCEDURE — 1100F PR PT FALLS ASSESS DOC 2+ FALLS/FALL W/INJURY/YR: ICD-10-PCS | Mod: CPTII,S$GLB,, | Performed by: INTERNAL MEDICINE

## 2021-08-04 PROCEDURE — 3078F PR MOST RECENT DIASTOLIC BLOOD PRESSURE < 80 MM HG: ICD-10-PCS | Mod: CPTII,S$GLB,, | Performed by: INTERNAL MEDICINE

## 2021-08-04 PROCEDURE — 99999 PR PBB SHADOW E&M-EST. PATIENT-LVL IV: CPT | Mod: PBBFAC,,, | Performed by: INTERNAL MEDICINE

## 2021-08-04 PROCEDURE — 3288F FALL RISK ASSESSMENT DOCD: CPT | Mod: CPTII,S$GLB,, | Performed by: INTERNAL MEDICINE

## 2021-08-04 PROCEDURE — 1159F PR MEDICATION LIST DOCUMENTED IN MEDICAL RECORD: ICD-10-PCS | Mod: CPTII,S$GLB,, | Performed by: INTERNAL MEDICINE

## 2021-08-04 PROCEDURE — 1125F PR PAIN SEVERITY QUANTIFIED, PAIN PRESENT: ICD-10-PCS | Mod: CPTII,S$GLB,, | Performed by: INTERNAL MEDICINE

## 2021-08-04 PROCEDURE — 3078F DIAST BP <80 MM HG: CPT | Mod: CPTII,S$GLB,, | Performed by: INTERNAL MEDICINE

## 2021-08-04 PROCEDURE — 99214 OFFICE O/P EST MOD 30 MIN: CPT | Mod: S$GLB,,, | Performed by: INTERNAL MEDICINE

## 2021-08-04 PROCEDURE — 3075F PR MOST RECENT SYSTOLIC BLOOD PRESS GE 130-139MM HG: ICD-10-PCS | Mod: CPTII,S$GLB,, | Performed by: INTERNAL MEDICINE

## 2021-08-04 PROCEDURE — 1125F AMNT PAIN NOTED PAIN PRSNT: CPT | Mod: CPTII,S$GLB,, | Performed by: INTERNAL MEDICINE

## 2021-08-04 PROCEDURE — 99999 PR PBB SHADOW E&M-EST. PATIENT-LVL IV: ICD-10-PCS | Mod: PBBFAC,,, | Performed by: INTERNAL MEDICINE

## 2021-08-04 PROCEDURE — 3044F HG A1C LEVEL LT 7.0%: CPT | Mod: CPTII,S$GLB,, | Performed by: INTERNAL MEDICINE

## 2021-08-04 PROCEDURE — 3075F SYST BP GE 130 - 139MM HG: CPT | Mod: CPTII,S$GLB,, | Performed by: INTERNAL MEDICINE

## 2021-08-04 PROCEDURE — 3044F PR MOST RECENT HEMOGLOBIN A1C LEVEL <7.0%: ICD-10-PCS | Mod: CPTII,S$GLB,, | Performed by: INTERNAL MEDICINE

## 2021-08-04 PROCEDURE — 3008F BODY MASS INDEX DOCD: CPT | Mod: CPTII,S$GLB,, | Performed by: INTERNAL MEDICINE

## 2021-08-04 PROCEDURE — 1100F PTFALLS ASSESS-DOCD GE2>/YR: CPT | Mod: CPTII,S$GLB,, | Performed by: INTERNAL MEDICINE

## 2021-08-04 RX ORDER — ROSUVASTATIN CALCIUM 5 MG/1
5 TABLET, COATED ORAL DAILY
Qty: 30 TABLET | Refills: 11 | Status: SHIPPED | OUTPATIENT
Start: 2021-08-04 | End: 2022-08-17

## 2021-08-14 ENCOUNTER — PATIENT MESSAGE (OUTPATIENT)
Dept: UROLOGY | Facility: CLINIC | Age: 71
End: 2021-08-14

## 2021-08-15 ENCOUNTER — PATIENT MESSAGE (OUTPATIENT)
Dept: UROLOGY | Facility: CLINIC | Age: 71
End: 2021-08-15

## 2021-08-15 ENCOUNTER — PATIENT MESSAGE (OUTPATIENT)
Dept: INTERNAL MEDICINE | Facility: CLINIC | Age: 71
End: 2021-08-15

## 2021-08-16 ENCOUNTER — PATIENT MESSAGE (OUTPATIENT)
Dept: INTERNAL MEDICINE | Facility: CLINIC | Age: 71
End: 2021-08-16

## 2021-08-16 DIAGNOSIS — K64.4 EXTERNAL HEMORRHOID: Primary | ICD-10-CM

## 2021-08-16 RX ORDER — HYDROCORTISONE 25 MG/G
CREAM TOPICAL 2 TIMES DAILY
Qty: 3.5 G | Refills: 0 | Status: SHIPPED | OUTPATIENT
Start: 2021-08-16 | End: 2021-08-23

## 2021-08-18 ENCOUNTER — OFFICE VISIT (OUTPATIENT)
Dept: UROLOGY | Facility: CLINIC | Age: 71
End: 2021-08-18
Payer: MEDICARE

## 2021-08-18 ENCOUNTER — TELEPHONE (OUTPATIENT)
Dept: UROLOGY | Facility: CLINIC | Age: 71
End: 2021-08-18

## 2021-08-18 VITALS
HEART RATE: 67 BPM | BODY MASS INDEX: 39.07 KG/M2 | DIASTOLIC BLOOD PRESSURE: 70 MMHG | SYSTOLIC BLOOD PRESSURE: 172 MMHG | HEIGHT: 60 IN | WEIGHT: 199 LBS

## 2021-08-18 DIAGNOSIS — R10.9 LEFT FLANK PAIN: ICD-10-CM

## 2021-08-18 DIAGNOSIS — C64.2 RENAL CELL CARCINOMA OF LEFT KIDNEY: Primary | ICD-10-CM

## 2021-08-18 PROCEDURE — 3078F PR MOST RECENT DIASTOLIC BLOOD PRESSURE < 80 MM HG: ICD-10-PCS | Mod: CPTII,S$GLB,, | Performed by: NURSE PRACTITIONER

## 2021-08-18 PROCEDURE — 3077F PR MOST RECENT SYSTOLIC BLOOD PRESSURE >= 140 MM HG: ICD-10-PCS | Mod: CPTII,S$GLB,, | Performed by: NURSE PRACTITIONER

## 2021-08-18 PROCEDURE — 3008F PR BODY MASS INDEX (BMI) DOCUMENTED: ICD-10-PCS | Mod: CPTII,S$GLB,, | Performed by: NURSE PRACTITIONER

## 2021-08-18 PROCEDURE — 1160F RVW MEDS BY RX/DR IN RCRD: CPT | Mod: CPTII,S$GLB,, | Performed by: NURSE PRACTITIONER

## 2021-08-18 PROCEDURE — 1126F AMNT PAIN NOTED NONE PRSNT: CPT | Mod: CPTII,S$GLB,, | Performed by: NURSE PRACTITIONER

## 2021-08-18 PROCEDURE — 3044F PR MOST RECENT HEMOGLOBIN A1C LEVEL <7.0%: ICD-10-PCS | Mod: CPTII,S$GLB,, | Performed by: NURSE PRACTITIONER

## 2021-08-18 PROCEDURE — 1159F PR MEDICATION LIST DOCUMENTED IN MEDICAL RECORD: ICD-10-PCS | Mod: CPTII,S$GLB,, | Performed by: NURSE PRACTITIONER

## 2021-08-18 PROCEDURE — 3077F SYST BP >= 140 MM HG: CPT | Mod: CPTII,S$GLB,, | Performed by: NURSE PRACTITIONER

## 2021-08-18 PROCEDURE — 3008F BODY MASS INDEX DOCD: CPT | Mod: CPTII,S$GLB,, | Performed by: NURSE PRACTITIONER

## 2021-08-18 PROCEDURE — 3078F DIAST BP <80 MM HG: CPT | Mod: CPTII,S$GLB,, | Performed by: NURSE PRACTITIONER

## 2021-08-18 PROCEDURE — 1126F PR PAIN SEVERITY QUANTIFIED, NO PAIN PRESENT: ICD-10-PCS | Mod: CPTII,S$GLB,, | Performed by: NURSE PRACTITIONER

## 2021-08-18 PROCEDURE — 1160F PR REVIEW ALL MEDS BY PRESCRIBER/CLIN PHARMACIST DOCUMENTED: ICD-10-PCS | Mod: CPTII,S$GLB,, | Performed by: NURSE PRACTITIONER

## 2021-08-18 PROCEDURE — 99213 OFFICE O/P EST LOW 20 MIN: CPT | Mod: S$GLB,,, | Performed by: NURSE PRACTITIONER

## 2021-08-18 PROCEDURE — 99213 PR OFFICE/OUTPT VISIT, EST, LEVL III, 20-29 MIN: ICD-10-PCS | Mod: S$GLB,,, | Performed by: NURSE PRACTITIONER

## 2021-08-18 PROCEDURE — 1159F MED LIST DOCD IN RCRD: CPT | Mod: CPTII,S$GLB,, | Performed by: NURSE PRACTITIONER

## 2021-08-18 PROCEDURE — 3044F HG A1C LEVEL LT 7.0%: CPT | Mod: CPTII,S$GLB,, | Performed by: NURSE PRACTITIONER

## 2021-08-26 ENCOUNTER — HOSPITAL ENCOUNTER (OUTPATIENT)
Dept: RADIOLOGY | Facility: HOSPITAL | Age: 71
Discharge: HOME | End: 2021-08-26
Attending: NURSE PRACTITIONER
Payer: MEDICARE

## 2021-08-26 DIAGNOSIS — C64.2 RENAL CELL CARCINOMA OF LEFT KIDNEY: ICD-10-CM

## 2021-08-26 PROCEDURE — 76700 US EXAM ABDOM COMPLETE: CPT | Mod: 26,,, | Performed by: RADIOLOGY

## 2021-08-26 PROCEDURE — 76700 US ABDOMEN COMPLETE: ICD-10-PCS | Mod: 26,,, | Performed by: RADIOLOGY

## 2021-08-26 PROCEDURE — 76700 US EXAM ABDOM COMPLETE: CPT | Mod: TC

## 2021-08-27 ENCOUNTER — PATIENT MESSAGE (OUTPATIENT)
Dept: UROLOGY | Facility: CLINIC | Age: 71
End: 2021-08-27

## 2021-09-03 ENCOUNTER — TELEPHONE (OUTPATIENT)
Dept: INTERNAL MEDICINE | Facility: CLINIC | Age: 71
End: 2021-09-03

## 2021-09-03 ENCOUNTER — PATIENT MESSAGE (OUTPATIENT)
Dept: INTERNAL MEDICINE | Facility: CLINIC | Age: 71
End: 2021-09-03

## 2021-09-04 ENCOUNTER — PATIENT MESSAGE (OUTPATIENT)
Dept: INTERNAL MEDICINE | Facility: CLINIC | Age: 71
End: 2021-09-04

## 2021-09-13 NOTE — ANESTHESIA POSTPROCEDURE EVALUATION
Anesthesia Post Evaluation    Patient: Daisy Severino    Procedure(s) Performed: Procedure(s) (LRB):  CLOSED REDUCTION (Right)    Final Anesthesia Type: MAC    Patient location during evaluation: ED  Patient participation: Yes- Able to Participate  Level of consciousness: awake and alert  Post-procedure vital signs: reviewed and stable  Pain management: adequate  Airway patency: patent    PONV status at discharge: No PONV  Anesthetic complications: no      Cardiovascular status: blood pressure returned to baseline  Respiratory status: unassisted and spontaneous ventilation  Hydration status: euvolemic  Follow-up not needed.          Vitals Value Taken Time   /67 1/8/2020  5:02 PM   Temp 36.9 °C (98.5 °F) 1/8/2020  5:02 PM   Pulse 58 1/8/2020  5:02 PM   Resp 18 1/8/2020  5:02 PM   SpO2 99 % 1/8/2020  5:02 PM         No case tracking events are documented in the log.      Pain/Andrew Score: Pain Rating Prior to Med Admin: 10 (1/8/2020  2:11 PM)        
Not applicable

## 2021-10-02 ENCOUNTER — PATIENT MESSAGE (OUTPATIENT)
Dept: ADMINISTRATIVE | Facility: HOSPITAL | Age: 71
End: 2021-10-02

## 2021-10-11 ENCOUNTER — PATIENT MESSAGE (OUTPATIENT)
Dept: INTERNAL MEDICINE | Facility: CLINIC | Age: 71
End: 2021-10-11

## 2021-10-13 DIAGNOSIS — R42 DIZZINESS: Primary | ICD-10-CM

## 2021-10-13 RX ORDER — ONDANSETRON 4 MG/1
4 TABLET, FILM COATED ORAL EVERY 12 HOURS PRN
Qty: 14 TABLET | Refills: 1 | Status: SHIPPED | OUTPATIENT
Start: 2021-10-13

## 2021-10-18 ENCOUNTER — PATIENT MESSAGE (OUTPATIENT)
Dept: INTERNAL MEDICINE | Facility: CLINIC | Age: 71
End: 2021-10-18

## 2021-10-19 ENCOUNTER — PATIENT MESSAGE (OUTPATIENT)
Dept: INTERNAL MEDICINE | Facility: CLINIC | Age: 71
End: 2021-10-19
Payer: MEDICARE

## 2021-10-19 DIAGNOSIS — R42 VERTIGO: Primary | ICD-10-CM

## 2021-10-19 RX ORDER — MECLIZINE HCL 12.5 MG 12.5 MG/1
12.5 TABLET ORAL 2 TIMES DAILY PRN
Qty: 28 TABLET | Refills: 1 | Status: SHIPPED | OUTPATIENT
Start: 2021-10-19

## 2021-10-19 RX ORDER — DIAZEPAM 2 MG/1
2 TABLET ORAL EVERY 12 HOURS PRN
Qty: 14 TABLET | Refills: 0 | Status: SHIPPED | OUTPATIENT
Start: 2021-10-19

## 2021-11-28 ENCOUNTER — PATIENT MESSAGE (OUTPATIENT)
Dept: UROLOGY | Facility: CLINIC | Age: 71
End: 2021-11-28
Payer: MEDICARE

## 2021-11-29 ENCOUNTER — TELEPHONE (OUTPATIENT)
Dept: UROLOGY | Facility: CLINIC | Age: 71
End: 2021-11-29
Payer: MEDICARE

## 2021-12-03 ENCOUNTER — HOSPITAL ENCOUNTER (EMERGENCY)
Facility: HOSPITAL | Age: 71
Discharge: HOME | End: 2021-12-03
Attending: EMERGENCY MEDICINE
Payer: MEDICARE

## 2021-12-03 VITALS
WEIGHT: 199.06 LBS | BODY MASS INDEX: 38.88 KG/M2 | HEART RATE: 68 BPM | RESPIRATION RATE: 20 BRPM | OXYGEN SATURATION: 100 % | SYSTOLIC BLOOD PRESSURE: 163 MMHG | TEMPERATURE: 98 F | DIASTOLIC BLOOD PRESSURE: 72 MMHG

## 2021-12-03 DIAGNOSIS — M25.511 RIGHT SHOULDER PAIN: ICD-10-CM

## 2021-12-03 DIAGNOSIS — S43.014A ANTERIOR DISLOCATION OF RIGHT SHOULDER, INITIAL ENCOUNTER: Primary | ICD-10-CM
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
## 2021-12-03 PROCEDURE — 23650 CLTX SHO DSLC W/MNPJ WO ANES: CPT | Mod: 54,RT,, | Performed by: EMERGENCY MEDICINE

## 2021-12-03 PROCEDURE — 99152 PR MOD CONSCIOUS SEDATION, SAME PHYS, 5+ YRS, FIRST 15 MIN: ICD-10-PCS | Mod: ,,, | Performed by: EMERGENCY MEDICINE

## 2021-12-03 PROCEDURE — 25000003 PHARM REV CODE 250: Performed by: EMERGENCY MEDICINE

## 2021-12-03 PROCEDURE — 99285 PR EMERGENCY DEPT VISIT,LEVEL V: ICD-10-PCS | Mod: 25,,, | Performed by: EMERGENCY MEDICINE

## 2021-12-03 PROCEDURE — 23650 CLTX SHO DSLC W/MNPJ WO ANES: CPT | Mod: RT

## 2021-12-03 PROCEDURE — 96374 THER/PROPH/DIAG INJ IV PUSH: CPT | Mod: 59

## 2021-12-03 PROCEDURE — 23650 PR CLOSED RX SHLDR DISLOCATION: ICD-10-PCS | Mod: 54,RT,, | Performed by: EMERGENCY MEDICINE

## 2021-12-03 PROCEDURE — 99152 MOD SED SAME PHYS/QHP 5/>YRS: CPT | Mod: ,,, | Performed by: EMERGENCY MEDICINE

## 2021-12-03 PROCEDURE — 99285 EMERGENCY DEPT VISIT HI MDM: CPT | Mod: 25,,, | Performed by: EMERGENCY MEDICINE

## 2021-12-03 PROCEDURE — 99152 MOD SED SAME PHYS/QHP 5/>YRS: CPT

## 2021-12-03 PROCEDURE — 63600175 PHARM REV CODE 636 W HCPCS: Performed by: PHYSICIAN ASSISTANT

## 2021-12-03 PROCEDURE — 99285 EMERGENCY DEPT VISIT HI MDM: CPT | Mod: 25

## 2021-12-03 RX ORDER — KETAMINE HCL IN 0.9 % NACL 50 MG/5 ML
1 SYRINGE (ML) INTRAVENOUS
Status: COMPLETED | OUTPATIENT
Start: 2021-12-03 | End: 2021-12-03

## 2021-12-03 RX ORDER — PROPOFOL 10 MG/ML
1 VIAL (ML) INTRAVENOUS
Status: COMPLETED | OUTPATIENT
Start: 2021-12-03 | End: 2021-12-03

## 2021-12-03 RX ORDER — FENTANYL CITRATE 50 UG/ML
50 INJECTION, SOLUTION INTRAMUSCULAR; INTRAVENOUS
Status: COMPLETED | OUTPATIENT
Start: 2021-12-03 | End: 2021-12-03

## 2021-12-03 RX ADMIN — PROPOFOL 50 MG: 10 INJECTION, EMULSION INTRAVENOUS at 08:12

## 2021-12-03 RX ADMIN — FENTANYL CITRATE 50 MCG: 50 INJECTION INTRAMUSCULAR; INTRAVENOUS at 05:12

## 2021-12-03 RX ADMIN — Medication 50 MG: at 08:12

## 2021-12-22 DIAGNOSIS — Z12.11 COLON CANCER SCREENING: ICD-10-CM

## 2022-01-04 ENCOUNTER — TELEPHONE (OUTPATIENT)
Dept: OBSTETRICS AND GYNECOLOGY | Facility: CLINIC | Age: 72
End: 2022-01-04
Payer: MEDICARE

## 2022-01-04 NOTE — TELEPHONE ENCOUNTER
----- Message from Lida Oden sent at 1/4/2022 12:05 PM CST -----  Type:  Needs Medical Advice    Who Called:  Daisy   Symptoms (please be specific):  itching, burning and dryness in the vaginal area  How long has patient had these symptoms:   1 month   Pharmacy name and phone #:   [facility] ([place]) - [place] - [address]   Phone: [phone]  Fax:  [phone]  Would the patient rather a call back or a response via [facility]? Call back    Best Call Back Number:  [phone]  Additional Information:  would like to know what type of cream she can use

## 2022-01-05 RX ORDER — ESTRADIOL 0.1 MG/G
1 CREAM VAGINAL
Qty: 42.5 G | Refills: 1 | Status: SHIPPED | OUTPATIENT
Start: 2022-01-05 | End: 2023-01-05

## 2022-01-05 RX ORDER — FLUCONAZOLE 100 MG/1
100 TABLET ORAL DAILY
Qty: 3 TABLET | Refills: 2 | Status: SHIPPED | OUTPATIENT
Start: 2022-01-05

## 2022-01-05 RX ORDER — CLOTRIMAZOLE AND BETAMETHASONE DIPROPIONATE 10; .64 MG/G; MG/G
CREAM TOPICAL
Qty: 30 G | Refills: 1 | Status: SHIPPED | OUTPATIENT
Start: 2022-01-05 | End: 2022-02-20

## 2022-01-05 NOTE — TELEPHONE ENCOUNTER
Sending diflucan/lotrisone to treat yeast sympomts  Once that done  uise the estrogen cream once a week

## 2022-01-09 NOTE — TELEPHONE ENCOUNTER
Unfortunately there is no other options, i'm  seeng a lot this week because new deductibles are up now.:(  She can ask her phramacy if anyting cheaper or coupons??

## 2022-01-19 ENCOUNTER — PATIENT MESSAGE (OUTPATIENT)
Dept: ADMINISTRATIVE | Facility: HOSPITAL | Age: 72
End: 2022-01-19
Payer: MEDICARE

## 2022-01-27 ENCOUNTER — PATIENT MESSAGE (OUTPATIENT)
Dept: OBSTETRICS AND GYNECOLOGY | Facility: CLINIC | Age: 72
End: 2022-01-27
Payer: MEDICARE

## 2022-01-27 RX ORDER — ESTRADIOL 0.5 MG/1
0.5 TABLET ORAL DAILY
Qty: 90 TABLET | Refills: 1 | Status: SHIPPED | OUTPATIENT
Start: 2022-01-27

## 2022-02-17 ENCOUNTER — PATIENT MESSAGE (OUTPATIENT)
Dept: INTERNAL MEDICINE | Facility: CLINIC | Age: 72
End: 2022-02-17
Payer: MEDICARE

## 2022-02-17 DIAGNOSIS — M25.519 NECK AND SHOULDER PAIN: ICD-10-CM

## 2022-02-17 DIAGNOSIS — M54.2 NECK AND SHOULDER PAIN: ICD-10-CM

## 2022-02-17 DIAGNOSIS — M54.12 CERVICAL RADICULAR PAIN: ICD-10-CM

## 2022-02-17 DIAGNOSIS — M47.22 OSTEOARTHRITIS OF SPINE WITH RADICULOPATHY, CERVICAL REGION: ICD-10-CM

## 2022-02-18 RX ORDER — DICLOFENAC SODIUM 10 MG/G
2 GEL TOPICAL 2 TIMES DAILY PRN
Qty: 100 G | Refills: 1 | Status: SHIPPED | OUTPATIENT
Start: 2022-02-18 | End: 2022-04-28

## 2022-03-19 DIAGNOSIS — K21.9 GASTROESOPHAGEAL REFLUX DISEASE: ICD-10-CM

## 2022-03-19 NOTE — TELEPHONE ENCOUNTER
----- Message from Hodan Muniz sent at 3/18/2022  5:41 PM CDT -----  Regarding: Patient advice  Contact: Pt  Pt called in regards to getting an medication refill       famotidine (PEPCID) 20 MG tablet 90 tablet 3 2/15/2021  No  Sig - Route: TAKE 1 TABLET (20 MG TOTAL) BY MOUTH ONCE DAILY. AS NEEDED FOR ACID REFLUX - Oral  Sent to pharmacy as: famotidine (PEPCID) 20 MG tablet  Class: Normal  Order: [id number]  Date/Time Signed: 2/15/2021 12:14      E-Prescribing Status: Receipt confirmed by pharmacy (2/15/2021 12:15 PM CST)    [facility] - [address]  [phone]

## 2022-03-20 NOTE — TELEPHONE ENCOUNTER
Care Due:                  Date            Visit Type   Department     Provider  --------------------------------------------------------------------------------                                NP -                              PRIMARY      [facility] INTERNAL  Last Visit: 08-      CARE ([facility])   [facility]                              EP -                              PRIMARY      [facility] INTERNAL  Next Visit: 06-      [facility] ([facility])   [facility]                                                            Last  Test          Frequency    Reason                     Performed    Due Date  --------------------------------------------------------------------------------    CMP.........  12 months..  famotidine, losartan,      06- 06-                             rosuvastatin,                             triamterene-hydrochloroth                             iazide...................    Lipid Panel.  12 months..  rosuvastatin.............  06- 06-    Powered by [employer]. Reference number: [id number].   3/20/2022 9:57:06 AM CDT

## 2022-03-21 ENCOUNTER — PATIENT MESSAGE (OUTPATIENT)
Dept: INTERNAL MEDICINE | Facility: CLINIC | Age: 72
End: 2022-03-21
Payer: MEDICARE

## 2022-03-21 RX ORDER — FAMOTIDINE 20 MG/1
20 TABLET, FILM COATED ORAL DAILY
Qty: 90 TABLET | Refills: 3 | Status: SHIPPED | OUTPATIENT
Start: 2022-03-21

## 2022-06-11 ENCOUNTER — HOSPITAL ENCOUNTER (EMERGENCY)
Facility: HOSPITAL | Age: 72
Discharge: HOME | End: 2022-06-11
Attending: EMERGENCY MEDICINE
Payer: MEDICARE

## 2022-06-11 VITALS
DIASTOLIC BLOOD PRESSURE: 79 MMHG | WEIGHT: 190.25 LBS | SYSTOLIC BLOOD PRESSURE: 177 MMHG | HEART RATE: 77 BPM | HEIGHT: 60 IN | BODY MASS INDEX: 37.35 KG/M2 | OXYGEN SATURATION: 96 % | RESPIRATION RATE: 18 BRPM | TEMPERATURE: 98 F

## 2022-06-11 DIAGNOSIS — S49.91XA SHOULDER INJURY, RIGHT, INITIAL ENCOUNTER: ICD-10-CM

## 2022-06-11 DIAGNOSIS — S43.014A ANTERIOR SHOULDER DISLOCATION, RIGHT, INITIAL ENCOUNTER: Primary | ICD-10-CM

## 2022-06-11 PROCEDURE — 96374 THER/PROPH/DIAG INJ IV PUSH: CPT

## 2022-06-11 PROCEDURE — 99285 EMERGENCY DEPT VISIT HI MDM: CPT | Mod: 25

## 2022-06-11 PROCEDURE — 99284 PR EMERGENCY DEPT VISIT,LEVEL IV: ICD-10-PCS | Mod: 25,,, | Performed by: EMERGENCY MEDICINE

## 2022-06-11 PROCEDURE — 25000003 PHARM REV CODE 250: Performed by: STUDENT IN AN ORGANIZED HEALTH CARE EDUCATION/TRAINING PROGRAM

## 2022-06-11 PROCEDURE — 23650 PR CLOSED RX SHLDR DISLOCATION: ICD-10-PCS | Mod: 54,RT,, | Performed by: EMERGENCY MEDICINE

## 2022-06-11 PROCEDURE — 23650 CLTX SHO DSLC W/MNPJ WO ANES: CPT

## 2022-06-11 PROCEDURE — 63600175 PHARM REV CODE 636 W HCPCS: Performed by: STUDENT IN AN ORGANIZED HEALTH CARE EDUCATION/TRAINING PROGRAM

## 2022-06-11 PROCEDURE — 96375 TX/PRO/DX INJ NEW DRUG ADDON: CPT

## 2022-06-11 PROCEDURE — 23650 CLTX SHO DSLC W/MNPJ WO ANES: CPT | Mod: 54,RT,, | Performed by: EMERGENCY MEDICINE

## 2022-06-11 PROCEDURE — 99284 EMERGENCY DEPT VISIT MOD MDM: CPT | Mod: 25,,, | Performed by: EMERGENCY MEDICINE

## 2022-06-11 RX ORDER — LIDOCAINE HYDROCHLORIDE 10 MG/ML
20 INJECTION, SOLUTION EPIDURAL; INFILTRATION; INTRACAUDAL; PERINEURAL
Status: COMPLETED | OUTPATIENT
Start: 2022-06-11 | End: 2022-06-11

## 2022-06-11 RX ORDER — FENTANYL CITRATE 50 UG/ML
75 INJECTION, SOLUTION INTRAMUSCULAR; INTRAVENOUS
Status: COMPLETED | OUTPATIENT
Start: 2022-06-11 | End: 2022-06-11

## 2022-06-11 RX ORDER — KETOROLAC TROMETHAMINE 30 MG/ML
10 INJECTION, SOLUTION INTRAMUSCULAR; INTRAVENOUS
Status: COMPLETED | OUTPATIENT
Start: 2022-06-11 | End: 2022-06-11

## 2022-06-11 RX ADMIN — LIDOCAINE HYDROCHLORIDE 200 MG: 10 INJECTION, SOLUTION EPIDURAL; INFILTRATION; INTRACAUDAL at 06:06

## 2022-06-11 RX ADMIN — KETOROLAC TROMETHAMINE 10 MG: 30 INJECTION, SOLUTION INTRAMUSCULAR at 06:06

## 2022-06-11 RX ADMIN — FENTANYL CITRATE 75 MCG: 50 INJECTION INTRAMUSCULAR; INTRAVENOUS at 06:06

## 2022-06-11 NOTE — ED TRIAGE NOTES
Pt presents to the ED c/o right shoulder dislocation. Denies fall/injury/trauma. States she was reaching for something while driving and it popped out. Took 650 mg tylenol 30 mins ago.

## 2022-06-11 NOTE — ED PROVIDER NOTES
Encounter Date: 6/11/2022       History     Chief Complaint   Patient presents with    Shoulder Injury     Dislocation again     72-year-old female with HTN, sciatica, history of right shoulder dislocations presents for shoulder injury that occurred a few hours prior to arrival.  Patient reports she feels like her shoulder is dislocated.  Patient states she was driving and lifted her arm laterally to reach for something when it suddenly popped out and was painful.  Patient took to normal strength Tylenol 30 minutes ago with minimal improvement in pain.  Patient denies any othe injury    The history is provided by the patient and medical records.     Review of patient's allergies indicates:   Allergen Reactions    Codeine Nausea And Vomiting     Past Medical History:   Diagnosis Date    Abnormal Pap smear of cervix     Cancer of kidney     Hypertension     Renal carcinoma     left kidney stage 1 renal cell carcinoma, s/p L nephrectomy 4/16/2013    Sciatica      Past Surgical History:   Procedure Laterality Date    BREAST LUMPECTOMY  age 16    benign    CLOSED REDUCTION Right 1/8/2020    Procedure: CLOSED REDUCTION;  Surgeon: Karmen Surgeon;  Location: [facility];  Service: Anesthesiology;  Laterality: Right;    HYSTERECTOMY  4/1988    partial hysterectomy for heavy bleeding    kidney removal  4/2014    left kidney    NEPHRECTOMY Left     TONSILLECTOMY  1957     Family History   Problem Relation Age of Onset    Cancer Father 61        bladder cancer, lung cancer (smoker)    Breast cancer Maternal Aunt     Breast cancer Maternal Aunt     Diabetes Maternal Grandmother     Diabetes Paternal Grandmother     Cancer Paternal Grandfather         head & neck cancer (smoker)     Social History     Tobacco Use    Smoking status: Never Smoker    Smokeless tobacco: Never Used   Substance Use Topics    Alcohol use: Yes     Alcohol/week: 5.0 standard drinks     Types: 5 Glasses of wine per week     Comment:  occasional wine    Drug use: No     Review of Systems   Constitutional: Negative for chills and fever.   HENT: Negative for rhinorrhea and sore throat.    Eyes: Negative for photophobia and visual disturbance.   Respiratory: Negative for cough, chest tightness and shortness of breath.    Cardiovascular: Negative for chest pain and palpitations.   Gastrointestinal: Negative for nausea and vomiting.   Genitourinary: Negative for difficulty urinating and dysuria.   Musculoskeletal: Positive for arthralgias and joint swelling. Negative for back pain.   Skin: Negative for pallor and rash.   Neurological: Negative for dizziness, weakness, numbness and headaches.   Psychiatric/Behavioral: Negative for agitation and confusion.       Physical Exam     Initial Vitals [06/11/22 1808]   BP Pulse Resp Temp SpO2   (!) 183/84 93 18 97.9 °F (36.6 °C) 95 %      MAP       --         Physical Exam    Nursing note and vitals reviewed.  Constitutional:   Alert, uncomfortable appearing, speaking full sentences   HENT:   Head: Normocephalic and atraumatic.   Eyes: Conjunctivae and EOM are normal.   Cardiovascular: Normal rate, regular rhythm, normal heart sounds and intact distal pulses.   Pulmonary/Chest: No stridor. No respiratory distress.   Musculoskeletal:      Comments: LUE:  No bony deformity or tenderness, normal range of motion without pain  RUE:  Deformity of the shoulder with palpable widening of the glenohumeral joint, range of motion limited by pain     Neurological: She is alert and oriented to person, place, and time.   Skin: Skin is warm and dry. No rash noted.   Psychiatric: She has a normal mood and affect. Thought content normal.         ED Course   Orthopedic Injury    Date/Time: 6/11/2022 7:19 PM  Performed by: Дмитрий Louis MD  Authorized by: Brendon Pratt MD     Location procedure was performed:  [facility] EMERGENCY DEPARTMENT  Injury:     Injury location:  Shoulder    Injury type:  Dislocation    Dislocation  type: anterior      Chronicity:  Recurrent      Pre-procedure assessment:     Neurovascular status: Neurovascularly intact      Distal perfusion: normal      Neurological function: normal      Range of motion: reduced      Local anesthesia used?: Yes      Anesthesia:  Hematoma block    Local anesthetic:  Lidocaine 1% without epinephrine    Anesthetic total (ml):  20    Patient sedated?: No        Selections made in this section will also lock the Injury type section above.:     Manipulation performed?: Yes      Reduction method:  Murray maneuver (With scapular manipulation)    Reduction method:  Murray maneuver (With scapular manipulation)    Reduction method:  Murray maneuver (With scapular manipulation)    Reduction method:  Murray maneuver (With scapular manipulation)    Reduction method:  Murray maneuver (With scapular manipulation)    Reduction method:  Murray maneuver (With scapular manipulation)    Reduction successful?: Yes      Confirmation: Reduction confirmed by x-ray      Immobilization:  Sling  Post-procedure assessment:     Neurovascular status: Neurovascularly intact      Patient tolerance:  Patient tolerated the procedure well with no immediate complications      Labs Reviewed - No data to display       Imaging Results          X-Ray Shoulder Trauma Right (Final result)  Result time 06/11/22 19:33:27    Final result by Tab Hdz MD (06/11/22 19:33:27)                 Impression:      1. No acute displaced fracture or dislocation of the right shoulder.      Electronically signed by: Tab Hdz MD  Date:    06/11/2022  Time:    19:33             Narrative:    EXAMINATION:  XR SHOULDER TRAUMA 3 VIEW RIGHT    CLINICAL HISTORY:  Unspecified injury of right shoulder and upper arm, initial encounter    TECHNIQUE:  Three or four views of the right shoulder were performed.    COMPARISON:  12/03/2021    FINDINGS:  Three views right shoulder.    The right humeral head maintains appropriate  relationship with the glenoid.  The acromioclavicular joint is intact.  No acute displaced right rib fracture.  The right lung zones are grossly clear.  Degenerative changes are noted of the acromioclavicular joint.                                 Medications   ketorolac injection 9.999 mg (9.999 mg Intravenous Given 6/11/22 1850)   fentaNYL 50 mcg/mL injection 75 mcg (75 mcg Intravenous Given 6/11/22 1850)   LIDOcaine (PF) 10 mg/ml (1%) injection 200 mg (200 mg Infiltration Given 6/11/22 1851)     Medical Decision Making:   History:   Old Medical Records: I decided to obtain old medical records.  Old Records Summarized: records from clinic visits and records from previous admission(s).  Initial Assessment:   72-year-old female with HTN, sciatica, history of right shoulder dislocations presents for shoulder injury that occurred a few hours prior to arrival  Patient alert, neuro intact, hemodynamically stable  Patient presents with right shoulder injury with visible deformity consistent with her recurrent dislocations  No evidence of other injury.  Other differentials include axial nerve injury, Hill-Sachs lesion, Bankart lesion, proximal humerus fracture, AC separation  IV Analgesia administered for pain, hematoma block performed. Cunningham technique attempted for reduction unsuccessfully, followed by Murray technique successfully  Patient discharged in a sling with orthopedics follow-up, analgesia    Clinical Tests:   Radiological Study: Ordered and Reviewed                      Clinical Impression:   Final diagnoses:  [S49.91XA] Shoulder injury, right, initial encounter  [S43.014A] Anterior shoulder dislocation, right, initial encounter (Primary)          ED Disposition Condition    Discharge Stable        ED Prescriptions     None        Follow-up Information     Follow up With Specialties Details Why Contact Info    Follow up with primary physician as soon as possible.  Call tomorrow for an appointment.         [facility] - Emergency Dept Emergency Medicine  Return to ED for worsening symptoms, inability to eat/drink, fever greater than 100.4, or any other concerns. [address]  [facility] [id number] [phone]           Дмитрий Louis MD  Resident  06/11/22 0057

## 2022-07-03 ENCOUNTER — PATIENT MESSAGE (OUTPATIENT)
Dept: INTERNAL MEDICINE | Facility: CLINIC | Age: 72
End: 2022-07-03
Payer: MEDICARE

## 2022-07-03 DIAGNOSIS — R73.03 BORDERLINE DIABETES MELLITUS: Primary | ICD-10-CM

## 2022-07-03 DIAGNOSIS — I10 ESSENTIAL HYPERTENSION: ICD-10-CM

## 2022-07-03 DIAGNOSIS — E78.2 MIXED HYPERLIPIDEMIA: ICD-10-CM

## 2022-07-14 ENCOUNTER — PATIENT MESSAGE (OUTPATIENT)
Dept: INTERNAL MEDICINE | Facility: CLINIC | Age: 72
End: 2022-07-14
Payer: MEDICARE

## 2022-07-14 DIAGNOSIS — I10 ESSENTIAL HYPERTENSION: ICD-10-CM

## 2022-07-14 RX ORDER — LOSARTAN POTASSIUM 50 MG/1
50 TABLET ORAL DAILY
Qty: 90 TABLET | Refills: 0 | Status: SHIPPED | OUTPATIENT
Start: 2022-07-14 | End: 2022-10-11

## 2022-07-14 RX ORDER — TRIAMTERENE/HYDROCHLOROTHIAZID 37.5-25 MG
1 TABLET ORAL DAILY
Qty: 90 TABLET | Refills: 0 | Status: SHIPPED | OUTPATIENT
Start: 2022-07-14 | End: 2022-10-11

## 2022-07-24 ENCOUNTER — PATIENT MESSAGE (OUTPATIENT)
Dept: INTERNAL MEDICINE | Facility: CLINIC | Age: 72
End: 2022-07-24
Payer: MEDICARE

## 2022-08-09 ENCOUNTER — OFFICE VISIT (OUTPATIENT)
Dept: PODIATRY | Facility: CLINIC | Age: 72
End: 2022-08-09
Payer: MEDICARE

## 2022-08-09 VITALS
HEART RATE: 58 BPM | SYSTOLIC BLOOD PRESSURE: 142 MMHG | BODY MASS INDEX: 35.95 KG/M2 | DIASTOLIC BLOOD PRESSURE: 78 MMHG | WEIGHT: 184.06 LBS

## 2022-08-09 DIAGNOSIS — S93.602A FOOT SPRAIN, LEFT, INITIAL ENCOUNTER: Primary | ICD-10-CM

## 2022-08-09 DIAGNOSIS — M47.22 OSTEOARTHRITIS OF SPINE WITH RADICULOPATHY, CERVICAL REGION: ICD-10-CM

## 2022-08-09 PROCEDURE — 3078F DIAST BP <80 MM HG: CPT | Mod: CPTII,S$GLB,, | Performed by: PODIATRIST

## 2022-08-09 PROCEDURE — 3008F BODY MASS INDEX DOCD: CPT | Mod: CPTII,S$GLB,, | Performed by: PODIATRIST

## 2022-08-09 PROCEDURE — 4010F PR ACE/ARB THEARPY RXD/TAKEN: ICD-10-PCS | Mod: CPTII,S$GLB,, | Performed by: PODIATRIST

## 2022-08-09 PROCEDURE — 3077F SYST BP >= 140 MM HG: CPT | Mod: CPTII,S$GLB,, | Performed by: PODIATRIST

## 2022-08-09 PROCEDURE — 99999 PR PBB SHADOW E&M-EST. PATIENT-LVL III: CPT | Mod: PBBFAC,,, | Performed by: PODIATRIST

## 2022-08-09 PROCEDURE — 3077F PR MOST RECENT SYSTOLIC BLOOD PRESSURE >= 140 MM HG: ICD-10-PCS | Mod: CPTII,S$GLB,, | Performed by: PODIATRIST

## 2022-08-09 PROCEDURE — 1126F AMNT PAIN NOTED NONE PRSNT: CPT | Mod: CPTII,S$GLB,, | Performed by: PODIATRIST

## 2022-08-09 PROCEDURE — 1159F MED LIST DOCD IN RCRD: CPT | Mod: CPTII,S$GLB,, | Performed by: PODIATRIST

## 2022-08-09 PROCEDURE — 99999 PR PBB SHADOW E&M-EST. PATIENT-LVL III: ICD-10-PCS | Mod: PBBFAC,,, | Performed by: PODIATRIST

## 2022-08-09 PROCEDURE — 99203 PR OFFICE/OUTPT VISIT, NEW, LEVL III, 30-44 MIN: ICD-10-PCS | Mod: S$GLB,,, | Performed by: PODIATRIST

## 2022-08-09 PROCEDURE — 1159F PR MEDICATION LIST DOCUMENTED IN MEDICAL RECORD: ICD-10-PCS | Mod: CPTII,S$GLB,, | Performed by: PODIATRIST

## 2022-08-09 PROCEDURE — 3008F PR BODY MASS INDEX (BMI) DOCUMENTED: ICD-10-PCS | Mod: CPTII,S$GLB,, | Performed by: PODIATRIST

## 2022-08-09 PROCEDURE — 4010F ACE/ARB THERAPY RXD/TAKEN: CPT | Mod: CPTII,S$GLB,, | Performed by: PODIATRIST

## 2022-08-09 PROCEDURE — 3078F PR MOST RECENT DIASTOLIC BLOOD PRESSURE < 80 MM HG: ICD-10-PCS | Mod: CPTII,S$GLB,, | Performed by: PODIATRIST

## 2022-08-09 PROCEDURE — 99203 OFFICE O/P NEW LOW 30 MIN: CPT | Mod: S$GLB,,, | Performed by: PODIATRIST

## 2022-08-09 PROCEDURE — 1126F PR PAIN SEVERITY QUANTIFIED, NO PAIN PRESENT: ICD-10-PCS | Mod: CPTII,S$GLB,, | Performed by: PODIATRIST

## 2022-08-09 RX ORDER — METHYLPREDNISOLONE 4 MG/1
TABLET ORAL
Qty: 21 TABLET | Refills: 1 | Status: SHIPPED | OUTPATIENT
Start: 2022-08-09 | End: 2022-08-30

## 2022-08-13 NOTE — PROGRESS NOTES
Subjective:      Patient ID: Daisy Severino is a 72 y.o. female.    Chief Complaint:   Foot Problem and Foot Pain (Top of left foot hurt when she put pressure on it )    Daisy is a 72 y.o. female who presents to the podiatry clinic  with complaint of  left foot pain. Onset of the symptoms was about a month ago. Precipitating event: increased activity. Current symptoms include: ability to bear weight, but with some pain. Aggravating factors: any weight bearing. Symptoms have gradually worsened. Patient has had no prior foot problems. Evaluation to date: none. Treatment to date: none. Patients rates pain 3/10 on pain scale.        Review of Systems   Constitutional: Negative for chills, decreased appetite, fever and malaise/fatigue.   HENT: Negative for congestion, hearing loss, nosebleeds and tinnitus.    Eyes: Negative for double vision, pain, photophobia and visual disturbance.   Cardiovascular: Negative for chest pain, cyanosis and leg swelling.   Respiratory: Negative for cough, hemoptysis, shortness of breath and wheezing.    Endocrine: Negative for cold intolerance and heat intolerance.   Hematologic/Lymphatic: Negative for adenopathy and bleeding problem.   Skin: Negative for color change, dry skin, itching, nail changes and suspicious lesions.   Musculoskeletal: Positive for joint pain, myalgias and stiffness. Negative for arthritis.   Gastrointestinal: Negative for abdominal pain, jaundice, nausea and vomiting.   Genitourinary: Negative for dysuria, frequency and hematuria.   Neurological: Negative for difficulty with concentration, loss of balance, numbness, paresthesias and sensory change.   Psychiatric/Behavioral: Negative for altered mental status, hallucinations and suicidal ideas. The patient is not nervous/anxious.    Allergic/Immunologic: Negative for environmental allergies and persistent infections.           Objective:      Physical Exam  Vitals reviewed.   Constitutional:       Appearance:  She is well-developed.   HENT:      Head: Normocephalic and atraumatic.   Cardiovascular:      Pulses:           Dorsalis pedis pulses are 2+ on the right side and 2+ on the left side.        Posterior tibial pulses are 2+ on the right side and 2+ on the left side.   Pulmonary:      Effort: Pulmonary effort is normal.   Musculoskeletal:         General: Normal range of motion.      Comments: Inspection and palpation of the muscles joints and bones of both lower extremities reveal that muscle strength for the anterior lateral and posterior muscle groups and intrinsic muscle groups of the foot are all 5 over 5 symmetrical.   Tenderness over the midfoot of the left foot dorsally specifically 1st 2nd 3rd met cuneiform joints.  Mild tenderness with inversion eversion of the midtarsal joint.   Skin:     General: Skin is warm and dry.      Capillary Refill: Capillary refill takes 2 to 3 seconds.      Comments: Skin turgor is normal bilaterally.  Skin texture is well hydrated to both lower extremities.  No lesions or rashes or wounds appreciated bilaterally.  Nail plates 1 through 5 bilaterally are within normal limits for length and thickness.  No nail clubbing or incurvation noted.   Neurological:      Mental Status: She is alert and oriented to person, place, and time.      Comments: Sharp dull light touch vibratory proprioceptive sensation are intact bilaterally.  Deep tendon reflexes to patellar and Achilles tendon are symmetrical 2 over 4 bilaterally.  No ankle clonus or Babinski reflexes noted bilaterally.  Coordination is normal to both feet and lower extremities.   Psychiatric:         Behavior: Behavior normal.               Assessment:       Encounter Diagnoses   Name Primary?    Osteoarthritis of spine with radiculopathy, cervical region     Foot sprain, left, initial encounter Yes     Independent visualization of imaging was performed.  Results were reviewed in detail with patient.       Plan:       Daisy  was seen today for foot problem and foot pain.    Diagnoses and all orders for this visit:    Foot sprain, left, initial encounter    Osteoarthritis of spine with radiculopathy, cervical region    Other orders  -     methylPREDNISolone (MEDROL DOSEPACK) 4 mg tablet; use as directed      I counseled the patient on her conditions, their implications and medical management.    The nature of the condition, options for management, as well as potential risks and complications were discussed in detail with patient. Patient was amenable to my recommendations and left my office fully informed and will follow up as instructed or sooner if necessary.      In Medrol pack, icing, appropriate shoe gear with over-the-counter arch supports.  Recommendations were made.  Follow-up as needed.

## 2022-08-15 ENCOUNTER — PATIENT MESSAGE (OUTPATIENT)
Dept: INTERNAL MEDICINE | Facility: CLINIC | Age: 72
End: 2022-08-15
Payer: MEDICARE

## 2022-08-17 ENCOUNTER — OFFICE VISIT (OUTPATIENT)
Dept: INTERNAL MEDICINE | Facility: CLINIC | Age: 72
End: 2022-08-17
Payer: MEDICARE

## 2022-08-17 VITALS
HEART RATE: 64 BPM | BODY MASS INDEX: 37.07 KG/M2 | SYSTOLIC BLOOD PRESSURE: 124 MMHG | HEIGHT: 60 IN | OXYGEN SATURATION: 98 % | TEMPERATURE: 98 F | DIASTOLIC BLOOD PRESSURE: 76 MMHG | WEIGHT: 188.81 LBS

## 2022-08-17 DIAGNOSIS — Z09 FOLLOW-UP EXAM: Primary | ICD-10-CM

## 2022-08-17 DIAGNOSIS — Z28.20 VACCINE REFUSED BY PATIENT: ICD-10-CM

## 2022-08-17 PROCEDURE — 99999 PR PBB SHADOW E&M-EST. PATIENT-LVL V: CPT | Mod: PBBFAC,,, | Performed by: NURSE PRACTITIONER

## 2022-08-17 PROCEDURE — 3078F PR MOST RECENT DIASTOLIC BLOOD PRESSURE < 80 MM HG: ICD-10-PCS | Mod: CPTII,S$GLB,, | Performed by: NURSE PRACTITIONER

## 2022-08-17 PROCEDURE — 3288F FALL RISK ASSESSMENT DOCD: CPT | Mod: CPTII,S$GLB,, | Performed by: NURSE PRACTITIONER

## 2022-08-17 PROCEDURE — 3074F SYST BP LT 130 MM HG: CPT | Mod: CPTII,S$GLB,, | Performed by: NURSE PRACTITIONER

## 2022-08-17 PROCEDURE — 99213 OFFICE O/P EST LOW 20 MIN: CPT | Mod: S$GLB,,, | Performed by: NURSE PRACTITIONER

## 2022-08-17 PROCEDURE — 1159F PR MEDICATION LIST DOCUMENTED IN MEDICAL RECORD: ICD-10-PCS | Mod: CPTII,S$GLB,, | Performed by: NURSE PRACTITIONER

## 2022-08-17 PROCEDURE — 99999 PR PBB SHADOW E&M-EST. PATIENT-LVL V: ICD-10-PCS | Mod: PBBFAC,,, | Performed by: NURSE PRACTITIONER

## 2022-08-17 PROCEDURE — 1160F RVW MEDS BY RX/DR IN RCRD: CPT | Mod: CPTII,S$GLB,, | Performed by: NURSE PRACTITIONER

## 2022-08-17 PROCEDURE — 3044F PR MOST RECENT HEMOGLOBIN A1C LEVEL <7.0%: ICD-10-PCS | Mod: CPTII,S$GLB,, | Performed by: NURSE PRACTITIONER

## 2022-08-17 PROCEDURE — 4010F PR ACE/ARB THEARPY RXD/TAKEN: ICD-10-PCS | Mod: CPTII,S$GLB,, | Performed by: NURSE PRACTITIONER

## 2022-08-17 PROCEDURE — 1126F PR PAIN SEVERITY QUANTIFIED, NO PAIN PRESENT: ICD-10-PCS | Mod: CPTII,S$GLB,, | Performed by: NURSE PRACTITIONER

## 2022-08-17 PROCEDURE — 3044F HG A1C LEVEL LT 7.0%: CPT | Mod: CPTII,S$GLB,, | Performed by: NURSE PRACTITIONER

## 2022-08-17 PROCEDURE — 1160F PR REVIEW ALL MEDS BY PRESCRIBER/CLIN PHARMACIST DOCUMENTED: ICD-10-PCS | Mod: CPTII,S$GLB,, | Performed by: NURSE PRACTITIONER

## 2022-08-17 PROCEDURE — 3008F BODY MASS INDEX DOCD: CPT | Mod: CPTII,S$GLB,, | Performed by: NURSE PRACTITIONER

## 2022-08-17 PROCEDURE — 3078F DIAST BP <80 MM HG: CPT | Mod: CPTII,S$GLB,, | Performed by: NURSE PRACTITIONER

## 2022-08-17 PROCEDURE — 1159F MED LIST DOCD IN RCRD: CPT | Mod: CPTII,S$GLB,, | Performed by: NURSE PRACTITIONER

## 2022-08-17 PROCEDURE — 1101F PT FALLS ASSESS-DOCD LE1/YR: CPT | Mod: CPTII,S$GLB,, | Performed by: NURSE PRACTITIONER

## 2022-08-17 PROCEDURE — 4010F ACE/ARB THERAPY RXD/TAKEN: CPT | Mod: CPTII,S$GLB,, | Performed by: NURSE PRACTITIONER

## 2022-08-17 PROCEDURE — 1126F AMNT PAIN NOTED NONE PRSNT: CPT | Mod: CPTII,S$GLB,, | Performed by: NURSE PRACTITIONER

## 2022-08-17 PROCEDURE — 99213 PR OFFICE/OUTPT VISIT, EST, LEVL III, 20-29 MIN: ICD-10-PCS | Mod: S$GLB,,, | Performed by: NURSE PRACTITIONER

## 2022-08-17 PROCEDURE — 3008F PR BODY MASS INDEX (BMI) DOCUMENTED: ICD-10-PCS | Mod: CPTII,S$GLB,, | Performed by: NURSE PRACTITIONER

## 2022-08-17 PROCEDURE — 1101F PR PT FALLS ASSESS DOC 0-1 FALLS W/OUT INJ PAST YR: ICD-10-PCS | Mod: CPTII,S$GLB,, | Performed by: NURSE PRACTITIONER

## 2022-08-17 PROCEDURE — 3288F PR FALLS RISK ASSESSMENT DOCUMENTED: ICD-10-PCS | Mod: CPTII,S$GLB,, | Performed by: NURSE PRACTITIONER

## 2022-08-17 PROCEDURE — 3074F PR MOST RECENT SYSTOLIC BLOOD PRESSURE < 130 MM HG: ICD-10-PCS | Mod: CPTII,S$GLB,, | Performed by: NURSE PRACTITIONER

## 2022-08-17 RX ORDER — OMEPRAZOLE 40 MG/1
40 CAPSULE, DELAYED RELEASE ORAL DAILY
COMMUNITY
Start: 2022-03-07

## 2022-08-17 NOTE — PROGRESS NOTES
"Subjective:       Patient ID: Daisy Severino is a 72 y.o. female.    Vitals:  height is 5' (1.524 m) and weight is 85.6 kg (188 lb 13.2 oz). Her oral temperature is 97.7 °F (36.5 °C). Her blood pressure is 124/76 and her pulse is 64. Her oxygen saturation is 98%.     Chief Complaint: Annual Exam    Patient is a 72 y.o. female who presents to clinic for routine blood work and follow up exam. She was diagnosed with renal cell carcinoma (left kidney) in 2013, which has been removed. She has been seeing orthopedics for hip pain, states she is concerned that the hip pain may be referred pain. Denies dysuria, frequency, hematuria, fever, unintended weight loss.     Last abdominal US 08/18/2021 "1. Diffuse hepatic steatosis.  Previously noted 0.6-cm echogenic nodule in the right hepatic lobe is not appreciated on current exam. 2. 2.9-cm simple appearing right midpole renal cortical cyst, not significantly changed."    This is the extent of the patient's complaints at this time.    ROS      As per HPI and below:   General: No fever.   Eyes: No eye pain.   Cardiovascular: No chest pain.   Respiratory:  No dyspnea.   GI: No abdominal pain.   Skin: No rashes.   Neuro:  No syncope.  No focal deficits.   Musculoskeletal: No myalgias. Notes hip pain.     Objective:      Physical Exam    Nursing note and vitals reviewed.    Constitutional: AAOx3. No distress.  Eyes: EOMI. No discharge. Anicteric.  HENT:   Neck: Normal range of motion. Neck supple.  Cardiovascular: Normal rate.  Pulmonary/Chest: No respiratory distress. Effort normal.  No tachypnea. Speaking in complete sentences.  Abdominal: No distension and no mass. No TTP.  Musculoskeletal: Normal range of motion.   Neurological: GCS 15. Alert and oriented to person, place, and time. No gross cranial nerve, light touch or strength deficit. Coordination normal.   Skin: Skin is warm and dry.   Psychiatric: Behavior is normal. Judgment normal.       Assessment:       1. Follow-up " exam    2. Vaccine refused by patient          Plan:         Follow-up exam- Results of US from August 2021 reviewed at length with patient. She will return to clinic tomorrow for fasting labs and schedule annual exam with Dr. Burnette to transfer care.  -     Hemoglobin A1C; Future; Expected date: 08/17/2022    Vaccine refused by patient- I had a detailed discussion risks and benefits of COVID-19 vaccination with the patient, including the dramatically reduced risk of hospitalization and death. Patient informed that vaccination dose was immediately available. Patient declined.         Follow up with PCP.     Raisa Mcdaniel MSN, APRN, FNP-BC

## 2022-08-18 ENCOUNTER — PATIENT MESSAGE (OUTPATIENT)
Dept: PODIATRY | Facility: CLINIC | Age: 72
End: 2022-08-18
Payer: MEDICARE

## 2022-08-19 ENCOUNTER — TELEPHONE (OUTPATIENT)
Dept: PODIATRY | Facility: CLINIC | Age: 72
End: 2022-08-19
Payer: MEDICARE

## 2022-08-19 NOTE — LETTER
August 19, 2022      [id number]  [address]  [facility] [address]  Phone: [phone]       Patient: Daisy Severino   YOB: 1950  Date of Visit: 08/19/2022    To Whom It May Concern:    Bonita Severino  was at [facility] on 8/9/2022. The patient may return to work/school on 8/17/2022 with no restrictions. If you have any questions or concerns, or if I can be of further assistance, please do not hesitate to contact me.    Sincerely,    Joss Garcia DPM

## 2022-08-22 ENCOUNTER — LAB VISIT (OUTPATIENT)
Dept: LAB | Facility: HOSPITAL | Age: 72
End: 2022-08-22
Payer: MEDICARE

## 2022-08-22 DIAGNOSIS — Z85.528 HISTORY OF RENAL CARCINOMA: ICD-10-CM

## 2022-08-22 DIAGNOSIS — R73.09 ELEVATED HEMOGLOBIN A1C: ICD-10-CM

## 2022-08-22 DIAGNOSIS — E78.2 MIXED HYPERLIPIDEMIA: ICD-10-CM

## 2022-08-22 DIAGNOSIS — R73.03 BORDERLINE DIABETES MELLITUS: ICD-10-CM

## 2022-08-22 DIAGNOSIS — I10 ESSENTIAL HYPERTENSION: ICD-10-CM

## 2022-08-22 DIAGNOSIS — Z09 FOLLOW-UP EXAM: ICD-10-CM

## 2022-08-22 LAB
ALBUMIN SERPL BCP-MCNC: 3.9 G/DL (ref 3.5–5.2)
ALP SERPL-CCNC: 104 U/L (ref 55–135)
ALT SERPL W/O P-5'-P-CCNC: 19 U/L (ref 10–44)
ANION GAP SERPL CALC-SCNC: 7 MMOL/L (ref 8–16)
AST SERPL-CCNC: 19 U/L (ref 10–40)
BASOPHILS # BLD AUTO: 0.06 K/UL (ref 0–0.2)
BASOPHILS NFR BLD: 0.7 % (ref 0–1.9)
BILIRUB SERPL-MCNC: 0.5 MG/DL (ref 0.1–1)
BUN SERPL-MCNC: 25 MG/DL (ref 8–23)
CALCIUM SERPL-MCNC: 9.7 MG/DL (ref 8.7–10.5)
CHLORIDE SERPL-SCNC: 103 MMOL/L (ref 95–110)
CHOLEST SERPL-MCNC: 194 MG/DL (ref 120–199)
CHOLEST/HDLC SERPL: 3.3 {RATIO} (ref 2–5)
CO2 SERPL-SCNC: 29 MMOL/L (ref 23–29)
CREAT SERPL-MCNC: 1 MG/DL (ref 0.5–1.4)
DIFFERENTIAL METHOD: ABNORMAL
EOSINOPHIL # BLD AUTO: 0.2 K/UL (ref 0–0.5)
EOSINOPHIL NFR BLD: 2.2 % (ref 0–8)
ERYTHROCYTE [DISTWIDTH] IN BLOOD BY AUTOMATED COUNT: 13.3 % (ref 11.5–14.5)
ERYTHROCYTE [DISTWIDTH] IN BLOOD BY AUTOMATED COUNT: 13.3 % (ref 11.5–14.5)
EST. GFR  (NO RACE VARIABLE): >60 ML/MIN/1.73 M^2
ESTIMATED AVG GLUCOSE: 126 MG/DL (ref 68–131)
ESTIMATED AVG GLUCOSE: 126 MG/DL (ref 68–131)
GLUCOSE SERPL-MCNC: 100 MG/DL (ref 70–110)
HBA1C MFR BLD: 6 % (ref 4–5.6)
HBA1C MFR BLD: 6 % (ref 4–5.6)
HCT VFR BLD AUTO: 44.5 % (ref 37–48.5)
HCT VFR BLD AUTO: 44.5 % (ref 37–48.5)
HDLC SERPL-MCNC: 59 MG/DL (ref 40–75)
HDLC SERPL: 30.4 % (ref 20–50)
HGB BLD-MCNC: 13.4 G/DL (ref 12–16)
HGB BLD-MCNC: 13.4 G/DL (ref 12–16)
IMM GRANULOCYTES # BLD AUTO: 0.02 K/UL (ref 0–0.04)
IMM GRANULOCYTES NFR BLD AUTO: 0.2 % (ref 0–0.5)
LDLC SERPL CALC-MCNC: 110.6 MG/DL (ref 63–159)
LYMPHOCYTES # BLD AUTO: 2.2 K/UL (ref 1–4.8)
LYMPHOCYTES NFR BLD: 27.1 % (ref 18–48)
MCH RBC QN AUTO: 27.8 PG (ref 27–31)
MCH RBC QN AUTO: 27.8 PG (ref 27–31)
MCHC RBC AUTO-ENTMCNC: 30.1 G/DL (ref 32–36)
MCHC RBC AUTO-ENTMCNC: 30.1 G/DL (ref 32–36)
MCV RBC AUTO: 92 FL (ref 82–98)
MCV RBC AUTO: 92 FL (ref 82–98)
MONOCYTES # BLD AUTO: 0.7 K/UL (ref 0.3–1)
MONOCYTES NFR BLD: 8.3 % (ref 4–15)
NEUTROPHILS # BLD AUTO: 4.9 K/UL (ref 1.8–7.7)
NEUTROPHILS NFR BLD: 61.5 % (ref 38–73)
NONHDLC SERPL-MCNC: 135 MG/DL
NRBC BLD-RTO: 0 /100 WBC
PLATELET # BLD AUTO: 318 K/UL (ref 150–450)
PLATELET # BLD AUTO: 318 K/UL (ref 150–450)
PMV BLD AUTO: 10.6 FL (ref 9.2–12.9)
PMV BLD AUTO: 10.6 FL (ref 9.2–12.9)
POTASSIUM SERPL-SCNC: 4.6 MMOL/L (ref 3.5–5.1)
PROT SERPL-MCNC: 7 G/DL (ref 6–8.4)
RBC # BLD AUTO: 4.82 M/UL (ref 4–5.4)
RBC # BLD AUTO: 4.82 M/UL (ref 4–5.4)
SODIUM SERPL-SCNC: 139 MMOL/L (ref 136–145)
TRIGL SERPL-MCNC: 122 MG/DL (ref 30–150)
TSH SERPL DL<=0.005 MIU/L-ACNC: 2.39 UIU/ML (ref 0.4–4)
WBC # BLD AUTO: 8.04 K/UL (ref 3.9–12.7)
WBC # BLD AUTO: 8.04 K/UL (ref 3.9–12.7)

## 2022-08-22 PROCEDURE — 84443 ASSAY THYROID STIM HORMONE: CPT | Performed by: INTERNAL MEDICINE

## 2022-08-22 PROCEDURE — 36415 COLL VENOUS BLD VENIPUNCTURE: CPT | Performed by: INTERNAL MEDICINE

## 2022-08-22 PROCEDURE — 80053 COMPREHEN METABOLIC PANEL: CPT | Performed by: INTERNAL MEDICINE

## 2022-08-22 PROCEDURE — 85025 COMPLETE CBC W/AUTO DIFF WBC: CPT | Performed by: INTERNAL MEDICINE

## 2022-08-22 PROCEDURE — 80061 LIPID PANEL: CPT | Performed by: INTERNAL MEDICINE

## 2022-08-22 PROCEDURE — 83036 HEMOGLOBIN GLYCOSYLATED A1C: CPT | Performed by: INTERNAL MEDICINE

## 2022-09-29 ENCOUNTER — PATIENT MESSAGE (OUTPATIENT)
Dept: INTERNAL MEDICINE | Facility: CLINIC | Age: 72
End: 2022-09-29
Payer: MEDICARE

## 2022-09-30 ENCOUNTER — PATIENT MESSAGE (OUTPATIENT)
Dept: INTERNAL MEDICINE | Facility: CLINIC | Age: 72
End: 2022-09-30
Payer: MEDICARE

## 2022-09-30 NOTE — TELEPHONE ENCOUNTER
Dr. Burnette wrote a comment on the A1C ordered at 8/22/222 07:09 ; the comment was written on 8/28/2022 at 5:14pm    The patient saw the comment on 9/30/2022 at 12:19pm

## 2022-10-22 ENCOUNTER — PATIENT MESSAGE (OUTPATIENT)
Dept: INTERNAL MEDICINE | Facility: CLINIC | Age: 72
End: 2022-10-22
Payer: MEDICARE

## 2022-10-24 ENCOUNTER — PATIENT MESSAGE (OUTPATIENT)
Dept: INTERNAL MEDICINE | Facility: CLINIC | Age: 72
End: 2022-10-24
Payer: MEDICARE

## 2022-10-24 NOTE — TELEPHONE ENCOUNTER
Called pt; pt answered    Scheduled pt for appt tomorrow     Pt verbalized understanding and gratitude

## 2022-10-24 NOTE — TELEPHONE ENCOUNTER
"Ortho states pt needs hip Sx    Pt requests US of abdomen or a bone scan     Pain is not relieved by tylenol, motrin, or "pain pill"   Pt cannot take anti inflammatory   Voltaren only relieves for a short span of time       Please advise   "

## 2022-10-25 ENCOUNTER — OFFICE VISIT (OUTPATIENT)
Dept: INTERNAL MEDICINE | Facility: CLINIC | Age: 72
End: 2022-10-25
Payer: MEDICARE

## 2022-10-25 ENCOUNTER — PATIENT MESSAGE (OUTPATIENT)
Dept: INTERNAL MEDICINE | Facility: CLINIC | Age: 72
End: 2022-10-25
Payer: MEDICARE

## 2022-10-25 ENCOUNTER — TELEPHONE (OUTPATIENT)
Dept: INTERNAL MEDICINE | Facility: CLINIC | Age: 72
End: 2022-10-25
Payer: MEDICARE

## 2022-10-25 VITALS
HEIGHT: 60 IN | WEIGHT: 190.25 LBS | BODY MASS INDEX: 37.35 KG/M2 | DIASTOLIC BLOOD PRESSURE: 86 MMHG | SYSTOLIC BLOOD PRESSURE: 138 MMHG | OXYGEN SATURATION: 99 % | HEART RATE: 74 BPM

## 2022-10-25 DIAGNOSIS — K21.9 GASTROESOPHAGEAL REFLUX DISEASE, UNSPECIFIED WHETHER ESOPHAGITIS PRESENT: ICD-10-CM

## 2022-10-25 DIAGNOSIS — Z12.31 ENCOUNTER FOR SCREENING MAMMOGRAM FOR MALIGNANT NEOPLASM OF BREAST: ICD-10-CM

## 2022-10-25 DIAGNOSIS — J30.89 ENVIRONMENTAL AND SEASONAL ALLERGIES: ICD-10-CM

## 2022-10-25 DIAGNOSIS — E78.2 MIXED HYPERLIPIDEMIA: ICD-10-CM

## 2022-10-25 DIAGNOSIS — Z12.11 ENCOUNTER FOR SCREENING FOR MALIGNANT NEOPLASM OF COLON: ICD-10-CM

## 2022-10-25 DIAGNOSIS — M85.80 OSTEOPENIA DETERMINED BY X-RAY: ICD-10-CM

## 2022-10-25 DIAGNOSIS — Z86.19 HISTORY OF HERPES ZOSTER: ICD-10-CM

## 2022-10-25 DIAGNOSIS — E66.01 SEVERE OBESITY (BMI 35.0-39.9) WITH COMORBIDITY: ICD-10-CM

## 2022-10-25 DIAGNOSIS — R73.03 BORDERLINE DIABETES MELLITUS: ICD-10-CM

## 2022-10-25 DIAGNOSIS — M16.11 PRIMARY OSTEOARTHRITIS OF RIGHT HIP: ICD-10-CM

## 2022-10-25 DIAGNOSIS — K76.0 FATTY LIVER: ICD-10-CM

## 2022-10-25 DIAGNOSIS — Z00.00 VISIT FOR ANNUAL HEALTH EXAMINATION: Primary | ICD-10-CM

## 2022-10-25 DIAGNOSIS — Z85.528 HISTORY OF RENAL CARCINOMA: ICD-10-CM

## 2022-10-25 DIAGNOSIS — I10 ESSENTIAL HYPERTENSION: ICD-10-CM

## 2022-10-25 PROCEDURE — 99397 PER PM REEVAL EST PAT 65+ YR: CPT | Mod: S$GLB,,, | Performed by: INTERNAL MEDICINE

## 2022-10-25 PROCEDURE — 99999 PR PBB SHADOW E&M-EST. PATIENT-LVL V: ICD-10-PCS | Mod: PBBFAC,,, | Performed by: INTERNAL MEDICINE

## 2022-10-25 PROCEDURE — 3079F PR MOST RECENT DIASTOLIC BLOOD PRESSURE 80-89 MM HG: ICD-10-PCS | Mod: CPTII,S$GLB,, | Performed by: INTERNAL MEDICINE

## 2022-10-25 PROCEDURE — 1125F PR PAIN SEVERITY QUANTIFIED, PAIN PRESENT: ICD-10-PCS | Mod: CPTII,S$GLB,, | Performed by: INTERNAL MEDICINE

## 2022-10-25 PROCEDURE — 3075F PR MOST RECENT SYSTOLIC BLOOD PRESS GE 130-139MM HG: ICD-10-PCS | Mod: CPTII,S$GLB,, | Performed by: INTERNAL MEDICINE

## 2022-10-25 PROCEDURE — 3079F DIAST BP 80-89 MM HG: CPT | Mod: CPTII,S$GLB,, | Performed by: INTERNAL MEDICINE

## 2022-10-25 PROCEDURE — 4010F PR ACE/ARB THEARPY RXD/TAKEN: ICD-10-PCS | Mod: CPTII,S$GLB,, | Performed by: INTERNAL MEDICINE

## 2022-10-25 PROCEDURE — 3288F PR FALLS RISK ASSESSMENT DOCUMENTED: ICD-10-PCS | Mod: CPTII,S$GLB,, | Performed by: INTERNAL MEDICINE

## 2022-10-25 PROCEDURE — 1101F PT FALLS ASSESS-DOCD LE1/YR: CPT | Mod: CPTII,S$GLB,, | Performed by: INTERNAL MEDICINE

## 2022-10-25 PROCEDURE — 3044F HG A1C LEVEL LT 7.0%: CPT | Mod: CPTII,S$GLB,, | Performed by: INTERNAL MEDICINE

## 2022-10-25 PROCEDURE — 3288F FALL RISK ASSESSMENT DOCD: CPT | Mod: CPTII,S$GLB,, | Performed by: INTERNAL MEDICINE

## 2022-10-25 PROCEDURE — 1159F PR MEDICATION LIST DOCUMENTED IN MEDICAL RECORD: ICD-10-PCS | Mod: CPTII,S$GLB,, | Performed by: INTERNAL MEDICINE

## 2022-10-25 PROCEDURE — 1159F MED LIST DOCD IN RCRD: CPT | Mod: CPTII,S$GLB,, | Performed by: INTERNAL MEDICINE

## 2022-10-25 PROCEDURE — 99999 PR PBB SHADOW E&M-EST. PATIENT-LVL V: CPT | Mod: PBBFAC,,, | Performed by: INTERNAL MEDICINE

## 2022-10-25 PROCEDURE — 3008F PR BODY MASS INDEX (BMI) DOCUMENTED: ICD-10-PCS | Mod: CPTII,S$GLB,, | Performed by: INTERNAL MEDICINE

## 2022-10-25 PROCEDURE — 3044F PR MOST RECENT HEMOGLOBIN A1C LEVEL <7.0%: ICD-10-PCS | Mod: CPTII,S$GLB,, | Performed by: INTERNAL MEDICINE

## 2022-10-25 PROCEDURE — 1101F PR PT FALLS ASSESS DOC 0-1 FALLS W/OUT INJ PAST YR: ICD-10-PCS | Mod: CPTII,S$GLB,, | Performed by: INTERNAL MEDICINE

## 2022-10-25 PROCEDURE — 99397 PR PREVENTIVE VISIT,EST,65 & OVER: ICD-10-PCS | Mod: S$GLB,,, | Performed by: INTERNAL MEDICINE

## 2022-10-25 PROCEDURE — 1160F RVW MEDS BY RX/DR IN RCRD: CPT | Mod: CPTII,S$GLB,, | Performed by: INTERNAL MEDICINE

## 2022-10-25 PROCEDURE — 1125F AMNT PAIN NOTED PAIN PRSNT: CPT | Mod: CPTII,S$GLB,, | Performed by: INTERNAL MEDICINE

## 2022-10-25 PROCEDURE — 3075F SYST BP GE 130 - 139MM HG: CPT | Mod: CPTII,S$GLB,, | Performed by: INTERNAL MEDICINE

## 2022-10-25 PROCEDURE — 3008F BODY MASS INDEX DOCD: CPT | Mod: CPTII,S$GLB,, | Performed by: INTERNAL MEDICINE

## 2022-10-25 PROCEDURE — 4010F ACE/ARB THERAPY RXD/TAKEN: CPT | Mod: CPTII,S$GLB,, | Performed by: INTERNAL MEDICINE

## 2022-10-25 PROCEDURE — 1160F PR REVIEW ALL MEDS BY PRESCRIBER/CLIN PHARMACIST DOCUMENTED: ICD-10-PCS | Mod: CPTII,S$GLB,, | Performed by: INTERNAL MEDICINE

## 2022-10-25 RX ORDER — TRIAMTERENE/HYDROCHLOROTHIAZID 37.5-25 MG
1 TABLET ORAL DAILY
Qty: 90 TABLET | Refills: 3 | Status: SHIPPED | OUTPATIENT
Start: 2022-10-25

## 2022-10-25 RX ORDER — LOSARTAN POTASSIUM 50 MG/1
50 TABLET ORAL DAILY
Qty: 90 TABLET | Refills: 3 | Status: SHIPPED | OUTPATIENT
Start: 2022-10-25

## 2022-10-25 RX ORDER — LANCETS
EACH MISCELLANEOUS
Qty: 100 EACH | Refills: 3 | Status: SHIPPED | OUTPATIENT
Start: 2022-10-25

## 2022-10-25 NOTE — TELEPHONE ENCOUNTER
----- Message from Devorah Rowland sent at 10/25/2022  9:26 AM CDT -----  Regarding: Handicap Plate  Please contact pt about next steps into what to do about getting handicap plate

## 2022-10-25 NOTE — PROGRESS NOTES
INTERNAL MEDICINE ESTABLISHED PATIENT VISIT NOTE    Subjective:     Chief Complaint: Hip Pain       Patient ID: Daisy Severino is a 72 y.o. female with HTN, HLD, prediabetes, fatty liver, GERD, back pain, h/o L RCC s/p L nephrectomy, last seen by me 8/2021, here today for annual.    Follows with Dr. Claude Williams of [facility]. Had a fall December 2021. Fell onto face and R shoulder while walking. Evaluated in ER for R shoulder dislocation. Underwent shoulder reduction. Also evaluated by Orthopedist. Underwent imaging that demonstrated OA of R hip. Pain has persisted.   R hip replacement recommended. Pain affecting ADLs, unable to stand for long periods of time.    Also reports L sided neck pain worse at night. Holds phone on L side while at work.     Past Medical History:  Past Medical History:   Diagnosis Date    Abnormal Pap smear of cervix     Cancer of kidney     Hypertension     Renal carcinoma     left kidney stage 1 renal cell carcinoma, s/p L nephrectomy 4/16/2013    Sciatica         Review of Systems:  Review of Systems   Constitutional:  Negative for chills and fever.   HENT:  Negative for congestion.    Respiratory:  Negative for cough and shortness of breath.    Cardiovascular:  Negative for chest pain.   Gastrointestinal:  Negative for constipation, nausea and vomiting.   Genitourinary:  Negative for hematuria and urgency.   Musculoskeletal:  Positive for joint pain. Negative for falls.   Skin:  Negative for rash.   Neurological:  Negative for dizziness and loss of consciousness.     Health Maintenance:   Immunizations:   Influenza - defer  Tdap - 3/2014  Covid 19 - complete  HPV  Prevnar rec at 67 - Pneumovax 7/2018, Prevnar 5/2017  Shingrix rec at 52 - defer     Cancer Screening:  PAP: 5/2021 NILM  Mammogram:  8/2020 BIRAD 1, order  Colonoscopy: 12/2020 FIT negative, order  DEXA:  8/2020 osteopenia of femoral neck, repeat 8/2023    Objective:   /86 (BP Location: Left arm, Patient  Position: Sitting, BP Method: Medium (Manual))   Pulse 74   Ht 5' (1.524 m)   Wt 86.3 kg (190 lb 4.1 oz)   LMP 04/01/1988   SpO2 99%   BMI 37.16 kg/m²      General: AAO x3, no apparent distress, using rolling walker, able to ambulate to exam table  HEENT: PERRL  CV: RRR, no m/r/g  Pulm: Lungs CTAB, no crackles, no wheezes  Abd: s/NT/ND +BS, obese  Extremities: no c/c/e    Labs:       Assessment/Plan     Visit for annual health examination  Labs reviewed    Primary osteoarthritis of right hip  Follows with OSH Orthopedist, Dr. Smith of [facility]  [facility] recommended, considering    Borderline diabetes mellitus  8/2022 HgbA1C 6; acceptable, monitor  -     lancets (ACCU-CHEK FASTCLIX LANCET DRUM) Misc; USE TO TEST BLOOD GLUCOSE ONCE DAILY  Dispense: 100 each; Refill: 3    Essential hypertension  Acceptable, continue current regimen  -     triamterene-hydrochlorothiazide 37.5-25 mg (MAXZIDE-25) 37.5-25 mg per tablet; Take 1 tablet by mouth once daily.  Dispense: 90 tablet; Refill: 3  -     losartan (COZAAR) 50 MG tablet; Take 1 tablet (50 mg total) by mouth once daily.  Dispense: 90 tablet; Refill: 3    Mixed hyperlipidemia  Acceptable, continue lifestyle changes    History of renal carcinoma  s/p L nephrectomy  -     US Abdomen Complete; Future; Expected date: 10/25/2022    History of herpes zoster    Gastroesophageal reflux disease, unspecified whether esophagitis present  Controlled, continue current regimen    Osteopenia determined by x-ray  Vit D supplement    Fatty liver  Counseled on lifestyle modifications including diet, exercise    Environmental and seasonal allergies    Severe obesity (BMI 35.0-39.9) with comorbidity    Encounter for screening mammogram for malignant neoplasm of breast  -     Mammo Digital Screening Bilat w/ Miguel Angel; Future; Expected date: 10/25/2022    Encounter for screening for malignant neoplasm of colon  -     Fecal Immunochemical Test (iFOBT); Future; Expected date:  10/25/2022     HM as above    RTC in 6 mo, sooner if needed.    Debi Burnette MD  Department of Internal Medicine - [facility]  11/05/2022

## 2022-10-26 ENCOUNTER — PATIENT MESSAGE (OUTPATIENT)
Dept: ADMINISTRATIVE | Facility: HOSPITAL | Age: 72
End: 2022-10-26
Payer: MEDICARE

## 2022-10-27 ENCOUNTER — PATIENT MESSAGE (OUTPATIENT)
Dept: INTERNAL MEDICINE | Facility: CLINIC | Age: 72
End: 2022-10-27
Payer: MEDICARE

## 2022-10-28 ENCOUNTER — HOSPITAL ENCOUNTER (OUTPATIENT)
Dept: RADIOLOGY | Facility: HOSPITAL | Age: 72
Discharge: HOME | End: 2022-10-28
Attending: INTERNAL MEDICINE
Payer: MEDICARE

## 2022-10-28 DIAGNOSIS — Z85.528 HISTORY OF RENAL CARCINOMA: ICD-10-CM

## 2022-10-28 PROCEDURE — 76700 US EXAM ABDOM COMPLETE: CPT | Mod: TC

## 2022-10-28 PROCEDURE — 76700 US EXAM ABDOM COMPLETE: CPT | Mod: 26,,, | Performed by: STUDENT IN AN ORGANIZED HEALTH CARE EDUCATION/TRAINING PROGRAM

## 2022-10-28 PROCEDURE — 76700 US ABDOMEN COMPLETE: ICD-10-PCS | Mod: 26,,, | Performed by: STUDENT IN AN ORGANIZED HEALTH CARE EDUCATION/TRAINING PROGRAM

## 2022-10-31 ENCOUNTER — LAB VISIT (OUTPATIENT)
Dept: LAB | Facility: HOSPITAL | Age: 72
End: 2022-10-31
Payer: MEDICARE

## 2022-10-31 DIAGNOSIS — Z12.11 ENCOUNTER FOR SCREENING FOR MALIGNANT NEOPLASM OF COLON: ICD-10-CM

## 2022-10-31 PROCEDURE — 82274 ASSAY TEST FOR BLOOD FECAL: CPT | Performed by: INTERNAL MEDICINE

## 2022-11-01 LAB — HEMOCCULT STL QL IA: NEGATIVE

## 2022-11-07 ENCOUNTER — TELEPHONE (OUTPATIENT)
Dept: INFECTIOUS DISEASES | Facility: HOSPITAL | Age: 72
End: 2022-11-07
Payer: MEDICARE

## 2022-11-07 ENCOUNTER — PATIENT MESSAGE (OUTPATIENT)
Dept: INTERNAL MEDICINE | Facility: CLINIC | Age: 72
End: 2022-11-07
Payer: MEDICARE

## 2022-11-07 DIAGNOSIS — U07.1 COVID-19 VIRUS INFECTION: Primary | ICD-10-CM

## 2022-11-07 DIAGNOSIS — U07.1 COVID: ICD-10-CM

## 2022-11-07 NOTE — TELEPHONE ENCOUNTER
Symptoms started Thurs  Headache and dizziness  100.8 fever  Chills, body ache, and weakness  Unable to get out of bed  Loss of appetite   Breathing ok     Please advise

## 2022-11-07 NOTE — TELEPHONE ENCOUNTER
Pt with elevated risk score 4. Recommend mAB infusion. Pharmacy to determine if pt qualifies for infusion vs Paxlovid and will contact pt directly. Please inform pt.

## 2022-11-08 ENCOUNTER — TELEPHONE (OUTPATIENT)
Dept: INFECTIOUS DISEASES | Facility: HOSPITAL | Age: 72
End: 2022-11-08
Payer: MEDICARE

## 2022-11-08 NOTE — TELEPHONE ENCOUNTER
Left our clinic number [phone], will try again around lunch time if she doesn't respond by then.

## 2022-11-08 NOTE — TELEPHONE ENCOUNTER
Spoke again later and since we don't have the infusion available at [facility] she will be getting it at our [place] clinic this week.

## 2022-11-09 ENCOUNTER — PATIENT MESSAGE (OUTPATIENT)
Dept: INTERNAL MEDICINE | Facility: CLINIC | Age: 72
End: 2022-11-09
Payer: MEDICARE

## 2022-11-10 ENCOUNTER — PATIENT MESSAGE (OUTPATIENT)
Dept: INTERNAL MEDICINE | Facility: CLINIC | Age: 72
End: 2022-11-10
Payer: MEDICARE

## 2022-11-11 ENCOUNTER — PATIENT MESSAGE (OUTPATIENT)
Dept: INTERNAL MEDICINE | Facility: CLINIC | Age: 72
End: 2022-11-11
Payer: MEDICARE

## 2022-11-14 ENCOUNTER — INFUSION (OUTPATIENT)
Dept: INFECTIOUS DISEASES | Facility: HOSPITAL | Age: 72
End: 2022-11-14
Attending: INTERNAL MEDICINE
Payer: MEDICARE

## 2022-11-14 ENCOUNTER — TELEPHONE (OUTPATIENT)
Dept: INFECTIOUS DISEASES | Facility: HOSPITAL | Age: 72
End: 2022-11-14
Payer: MEDICARE

## 2022-11-14 VITALS
HEIGHT: 60 IN | OXYGEN SATURATION: 100 % | TEMPERATURE: 98 F | HEART RATE: 62 BPM | RESPIRATION RATE: 18 BRPM | DIASTOLIC BLOOD PRESSURE: 70 MMHG | WEIGHT: 185 LBS | SYSTOLIC BLOOD PRESSURE: 163 MMHG | BODY MASS INDEX: 36.32 KG/M2

## 2022-11-14 DIAGNOSIS — U07.1 COVID-19: Primary | ICD-10-CM

## 2022-11-14 PROCEDURE — 63600175 PHARM REV CODE 636 W HCPCS: Performed by: INTERNAL MEDICINE

## 2022-11-14 PROCEDURE — M0222 HC IV INJECTION, BEBTELOVIMAB, INCL POST ADMIN MONIT: HCPCS | Performed by: INTERNAL MEDICINE

## 2022-11-14 RX ORDER — ACETAMINOPHEN 325 MG/1
650 TABLET ORAL
Status: ACTIVE | OUTPATIENT
Start: 2022-11-14 | End: 2022-11-15

## 2022-11-14 RX ORDER — BEBTELOVIMAB 87.5 MG/ML
175 INJECTION, SOLUTION INTRAVENOUS
Status: COMPLETED | OUTPATIENT
Start: 2022-11-14 | End: 2022-11-14

## 2022-11-14 RX ORDER — ALBUTEROL SULFATE 90 UG/1
2 AEROSOL, METERED RESPIRATORY (INHALATION)
Status: ACTIVE | OUTPATIENT
Start: 2022-11-14 | End: 2022-11-17

## 2022-11-14 RX ORDER — DIPHENHYDRAMINE HYDROCHLORIDE 50 MG/ML
25 INJECTION INTRAMUSCULAR; INTRAVENOUS
Status: ACTIVE | OUTPATIENT
Start: 2022-11-14 | End: 2022-11-15

## 2022-11-14 RX ORDER — ONDANSETRON 4 MG/1
4 TABLET, ORALLY DISINTEGRATING ORAL
Status: ACTIVE | OUTPATIENT
Start: 2022-11-14 | End: 2022-11-15

## 2022-11-14 RX ORDER — EPINEPHRINE 0.3 MG/.3ML
0.3 INJECTION SUBCUTANEOUS
Status: ACTIVE | OUTPATIENT
Start: 2022-11-14 | End: 2022-11-17

## 2022-11-14 RX ADMIN — BEBTELOVIMAB 175 MG: 87.5 INJECTION, SOLUTION INTRAVENOUS at 11:11

## 2022-11-14 NOTE — PROGRESS NOTES
Patient remains with no signs of complications noted. Patient received Bebtelovimab IV Injection according to FDA recommendations and [facility] SOP without complications noted and left with mask in place. Drug fact sheet provided. Pt discharged home. Ambulatory. Remains AAox3. No distress noted. RR even and unlabored.

## 2022-11-14 NOTE — PROGRESS NOTES
11/14/2022  1115    Patient arrived for Bebtelovimab injection. Patient ambulatory with steady gait noted into facility. No distress noted at this time. Pt is AAOx4, respirations are even and unlabored. VSS at this time.    Patient reports symptoms began on 11/5/2022  Symptoms reported as dizziness, headache, neck pain, fever    Diagnosed COVID+ on 11/7/2022

## 2023-03-05 ENCOUNTER — PATIENT MESSAGE (OUTPATIENT)
Dept: OBSTETRICS AND GYNECOLOGY | Facility: CLINIC | Age: 73
End: 2023-03-05
Payer: MEDICARE

## 2023-03-07 RX ORDER — ESTRADIOL 0.5 MG/1
0.5 TABLET ORAL DAILY
Qty: 90 TABLET | Refills: 2 | Status: SHIPPED | OUTPATIENT
Start: 2023-03-07 | End: 2023-10-15

## 2023-06-21 DIAGNOSIS — M25.551 CHRONIC RIGHT HIP PAIN: Primary | ICD-10-CM

## 2023-06-21 DIAGNOSIS — G89.29 CHRONIC RIGHT HIP PAIN: Primary | ICD-10-CM

## 2023-06-26 ENCOUNTER — HOSPITAL ENCOUNTER (OUTPATIENT)
Dept: RADIOLOGY | Facility: HOSPITAL | Age: 73
Discharge: HOME | End: 2023-06-26
Attending: ORTHOPAEDIC SURGERY
Payer: MEDICARE

## 2023-06-26 ENCOUNTER — OFFICE VISIT (OUTPATIENT)
Dept: ORTHOPEDICS | Facility: CLINIC | Age: 73
End: 2023-06-26
Payer: MEDICARE

## 2023-06-26 VITALS — HEIGHT: 60 IN | WEIGHT: 187.38 LBS | BODY MASS INDEX: 36.79 KG/M2

## 2023-06-26 DIAGNOSIS — M54.9 DORSALGIA, UNSPECIFIED: ICD-10-CM

## 2023-06-26 DIAGNOSIS — M16.11 PRIMARY OSTEOARTHRITIS OF RIGHT HIP: Primary | ICD-10-CM

## 2023-06-26 DIAGNOSIS — G89.29 CHRONIC RIGHT HIP PAIN: ICD-10-CM

## 2023-06-26 DIAGNOSIS — M54.16 LUMBAR RADICULOPATHY: ICD-10-CM

## 2023-06-26 DIAGNOSIS — M25.551 CHRONIC RIGHT HIP PAIN: ICD-10-CM

## 2023-06-26 PROCEDURE — 3288F PR FALLS RISK ASSESSMENT DOCUMENTED: ICD-10-PCS | Mod: CPTII,S$GLB,, | Performed by: ORTHOPAEDIC SURGERY

## 2023-06-26 PROCEDURE — 3288F FALL RISK ASSESSMENT DOCD: CPT | Mod: CPTII,S$GLB,, | Performed by: ORTHOPAEDIC SURGERY

## 2023-06-26 PROCEDURE — 1159F MED LIST DOCD IN RCRD: CPT | Mod: CPTII,S$GLB,, | Performed by: ORTHOPAEDIC SURGERY

## 2023-06-26 PROCEDURE — 3008F BODY MASS INDEX DOCD: CPT | Mod: CPTII,S$GLB,, | Performed by: ORTHOPAEDIC SURGERY

## 2023-06-26 PROCEDURE — 99204 OFFICE O/P NEW MOD 45 MIN: CPT | Mod: S$GLB,,, | Performed by: ORTHOPAEDIC SURGERY

## 2023-06-26 PROCEDURE — 3008F PR BODY MASS INDEX (BMI) DOCUMENTED: ICD-10-PCS | Mod: CPTII,S$GLB,, | Performed by: ORTHOPAEDIC SURGERY

## 2023-06-26 PROCEDURE — 73502 X-RAY EXAM HIP UNI 2-3 VIEWS: CPT | Mod: TC,RT

## 2023-06-26 PROCEDURE — 73502 XR HIP WITH PELVIS WHEN PERFORMED, 2 OR 3  VIEWS RIGHT: ICD-10-PCS | Mod: 26,RT,, | Performed by: RADIOLOGY

## 2023-06-26 PROCEDURE — 1125F AMNT PAIN NOTED PAIN PRSNT: CPT | Mod: CPTII,S$GLB,, | Performed by: ORTHOPAEDIC SURGERY

## 2023-06-26 PROCEDURE — 1101F PT FALLS ASSESS-DOCD LE1/YR: CPT | Mod: CPTII,S$GLB,, | Performed by: ORTHOPAEDIC SURGERY

## 2023-06-26 PROCEDURE — 1125F PR PAIN SEVERITY QUANTIFIED, PAIN PRESENT: ICD-10-PCS | Mod: CPTII,S$GLB,, | Performed by: ORTHOPAEDIC SURGERY

## 2023-06-26 PROCEDURE — 73502 X-RAY EXAM HIP UNI 2-3 VIEWS: CPT | Mod: 26,RT,, | Performed by: RADIOLOGY

## 2023-06-26 PROCEDURE — 4010F ACE/ARB THERAPY RXD/TAKEN: CPT | Mod: CPTII,S$GLB,, | Performed by: ORTHOPAEDIC SURGERY

## 2023-06-26 PROCEDURE — 1101F PR PT FALLS ASSESS DOC 0-1 FALLS W/OUT INJ PAST YR: ICD-10-PCS | Mod: CPTII,S$GLB,, | Performed by: ORTHOPAEDIC SURGERY

## 2023-06-26 PROCEDURE — 1160F RVW MEDS BY RX/DR IN RCRD: CPT | Mod: CPTII,S$GLB,, | Performed by: ORTHOPAEDIC SURGERY

## 2023-06-26 PROCEDURE — 99999 PR PBB SHADOW E&M-EST. PATIENT-LVL IV: ICD-10-PCS | Mod: PBBFAC,,, | Performed by: ORTHOPAEDIC SURGERY

## 2023-06-26 PROCEDURE — 1160F PR REVIEW ALL MEDS BY PRESCRIBER/CLIN PHARMACIST DOCUMENTED: ICD-10-PCS | Mod: CPTII,S$GLB,, | Performed by: ORTHOPAEDIC SURGERY

## 2023-06-26 PROCEDURE — 1159F PR MEDICATION LIST DOCUMENTED IN MEDICAL RECORD: ICD-10-PCS | Mod: CPTII,S$GLB,, | Performed by: ORTHOPAEDIC SURGERY

## 2023-06-26 PROCEDURE — 99204 PR OFFICE/OUTPT VISIT, NEW, LEVL IV, 45-59 MIN: ICD-10-PCS | Mod: S$GLB,,, | Performed by: ORTHOPAEDIC SURGERY

## 2023-06-26 PROCEDURE — 99999 PR PBB SHADOW E&M-EST. PATIENT-LVL IV: CPT | Mod: PBBFAC,,, | Performed by: ORTHOPAEDIC SURGERY

## 2023-06-26 PROCEDURE — 4010F PR ACE/ARB THEARPY RXD/TAKEN: ICD-10-PCS | Mod: CPTII,S$GLB,, | Performed by: ORTHOPAEDIC SURGERY

## 2023-06-26 RX ORDER — HYDROCODONE BITARTRATE AND ACETAMINOPHEN 5; 325 MG/1; MG/1
1 TABLET ORAL 4 TIMES DAILY PRN
COMMUNITY
Start: 2023-06-21

## 2023-06-26 RX ORDER — LIDOCAINE 50 MG/G
1 PATCH TOPICAL
COMMUNITY
Start: 2023-05-30 | End: 2023-08-17 | Stop reason: SDUPTHER

## 2023-06-26 RX ORDER — ONDANSETRON 4 MG/1
1 TABLET, ORALLY DISINTEGRATING ORAL
COMMUNITY
Start: 2023-03-09

## 2023-06-26 RX ORDER — LOSARTAN POTASSIUM 25 MG/1
25 TABLET ORAL DAILY
COMMUNITY

## 2023-06-26 NOTE — PROGRESS NOTES
Report from Corina. Admitting MD at bedside. Pt aware of POC. Call light within reach.    Subjective:      Patient ID: Daisy Severino is a 73 y.o. female.    Chief Complaint: Pain of the Right Hip    HPI  Daisy Severino is a 73 year old female here with a 3 year history of right hip pain. The patient works in an physician's office.. There was a history of trauma. She has had several falls since 2019. The pain is severe The pain is located in the groin.  There is is radiation.  The pain radaites to the knee and ankle.  The pain is described as burning. The patient has not  had prior surgery. It is aggravated by sitting, standing, and walking.  It  is not alleviated by rest. There is numbness or tingling of the lower extremity.  There is mild back pain.  She  has tried NSAIDS but not injections.   She does have difficulty getting in or out of a car, getting dressed, or going up or down stairs.  The patient does use an assistive device.Rollator.    Past Medical History:   Diagnosis Date    Abnormal Pap smear of cervix     Cancer of kidney     Hypertension     Renal carcinoma     left kidney stage 1 renal cell carcinoma, s/p L nephrectomy 4/16/2013    Sciatica      Past Surgical History:   Procedure Laterality Date    BREAST LUMPECTOMY  age 16    benign    CLOSED REDUCTION Right 1/8/2020    Procedure: CLOSED REDUCTION;  Surgeon: Karmen Surgeon;  Location: [facility];  Service: Anesthesiology;  Laterality: Right;    HYSTERECTOMY  4/1988    partial hysterectomy for heavy bleeding    kidney removal  4/2014    left kidney    NEPHRECTOMY Left     TONSILLECTOMY  1957     Family History   Problem Relation Age of Onset    Cancer Father 61        bladder cancer, lung cancer (smoker)    Breast cancer Maternal Aunt     Breast cancer Maternal Aunt     Diabetes Maternal Grandmother     Diabetes Paternal Grandmother     Cancer Paternal Grandfather         head & neck cancer (smoker)     Social History     Socioeconomic History    Marital status:    Tobacco Use    Smoking status: Never    Smokeless tobacco: Never    Substance and Sexual Activity    Alcohol use: Yes     Alcohol/week: 5.0 standard drinks     Types: 5 Glasses of wine per week     Comment: occasional wine    Drug use: No    Sexual activity: Yes     Partners: Male     Birth control/protection: None   Social History Narrative    Previously ,  passed away. In relationship with boyfriend x 6 years. 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         Doing stretches at home.      Social Determinants of Health     Financial Resource Strain: Low Risk     Difficulty of Paying Living Expenses: Not hard at all   Food Insecurity: No Food Insecurity    Worried About Running Out of Food in the Last Year: Never true    Ran Out of Food in the Last Year: Never true   Transportation Needs: No Transportation Needs    Lack of Transportation (Medical): No    Lack of Transportation (Non-Medical): No   Physical Activity: Inactive    Days of Exercise per Week: 0 days    Minutes of Exercise per Session: 0 min   Stress: No Stress Concern Present    Feeling of Stress : Not at all   Social Connections: Unknown    Frequency of Communication with Friends and Family: Once a week    Frequency of Social Gatherings with Friends and Family: Once a week    Active Member of Clubs or Organizations: No    Attends Club or Organization Meetings: Patient refused    Marital Status: Living with partner   Housing Stability: Low Risk     Unable to Pay for Housing in the Last Year: No    Number of Places Lived in the Last Year: 1    Unstable Housing in the Last Year: No     Current Outpatient Medications on File Prior to Visit   Medication Sig Dispense Refill    diclofenac sodium (VOLTAREN) 1 % Gel APPLY 2 GRAMS TOPICALLY TWICE DAILY AS NEEDED. FOR NECK AND SHOULDER PAIN - TOPICAL 100 g 11    estradioL (ESTRACE) 0.5 MG tablet Take 1 tablet (0.5 mg total) by mouth once daily. 90 tablet 1    estradioL (ESTRACE) 0.5 MG tablet Take 1 tablet (0.5 mg total) by mouth once daily. 90 tablet 2     famotidine (PEPCID) 20 MG tablet Take 1 tablet (20 mg total) by mouth once daily. As needed for acid reflux 90 tablet 3    fish oil-omega-3 fatty acids 300-1,000 mg capsule Take 2 g by mouth once daily.      HYDROcodone-acetaminophen (NORCO) 5-325 mg per tablet Take 1 tablet by mouth 4 (four) times daily as needed.      LIDOcaine (LIDODERM) 5 % 1 patch as needed.      loratadine (CLARITIN) 10 mg tablet Take 10 mg by mouth once daily.      losartan (COZAAR) 25 MG tablet Take 25 mg by mouth once daily. For total of 75mg daily      losartan (COZAAR) 50 MG tablet Take 1 tablet (50 mg total) by mouth once daily. 90 tablet 3    meclizine (ANTIVERT) 12.5 mg tablet Take 1 tablet (12.5 mg total) by mouth 2 (two) times daily as needed (vertigo). 28 tablet 1    multivitamin (THERAGRAN) per tablet Take 1 tablet by mouth once daily.      omeprazole (PRILOSEC) 40 MG capsule Take 40 mg by mouth once daily.      triamterene-hydrochlorothiazide 37.5-25 mg (MAXZIDE-25) 37.5-25 mg per tablet Take 1 tablet by mouth once daily. 90 tablet 3    vitamin D 1000 units Tab Take 1,000 Units by mouth once daily.      blood sugar diagnostic (ACCU-CHEK FAVIOLA PLUS TEST STRP) Strp USE ONE STRIP TO CHECK GLUCOSE ONCE DAILY 50 strip 6    calcium carbonate (OS-REGAN) 600 mg calcium (1,500 mg) Tab Take 600 mg by mouth once.      clotrimazole-betamethasone 1-0.05% (LOTRISONE) cream APPLY TO AFFECTED AREA 2 TIMES DAILY (Patient not taking: Reported on 10/25/2022) 30 g 1    diazePAM (VALIUM) 2 MG tablet Take 1 tablet (2 mg total) by mouth every 12 (twelve) hours as needed (vertigo). (Patient not taking: Reported on 6/26/2023) 14 tablet 0    fluconazole (DIFLUCAN) 100 MG tablet Take 1 tablet (100 mg total) by mouth once daily. (Patient not taking: Reported on 6/26/2023) 3 tablet 2    hydrocortisone 2.5 % cream Apply topically 2 (two) times daily. for 7 days 3.5 g 0    lancets (ACCU-CHEK FASTCLIX LANCET DRUM) Misc USE TO TEST BLOOD GLUCOSE ONCE DAILY 100 each  3    mupirocin (BACTROBAN) 2 % ointment Apply topically 2 (two) times daily. To area on ear (Patient not taking: Reported on 6/26/2023) 22 g 1    ondansetron (ZOFRAN) 4 MG tablet Take 1 tablet (4 mg total) by mouth every 12 (twelve) hours as needed (Nausea and vomiting). (Patient not taking: Reported on 6/26/2023) 14 tablet 1    ondansetron (ZOFRAN-ODT) 4 MG TbDL Take 1 tablet by mouth as needed.       No current facility-administered medications on file prior to visit.     Review of patient's allergies indicates:   Allergen Reactions    Codeine Nausea And Vomiting    Latex Rash    Meperidine Nausea And Vomiting       Review of Systems   Constitutional: Negative for chills, fever and night sweats.   HENT:  Negative for hearing loss.    Eyes:  Negative for blurred vision and double vision.   Cardiovascular:  Negative for chest pain, claudication and leg swelling.   Respiratory:  Negative for shortness of breath.    Endocrine: Negative for polydipsia, polyphagia and polyuria.   Hematologic/Lymphatic: Negative for adenopathy and bleeding problem. Does not bruise/bleed easily.   Skin:  Negative for poor wound healing.   Gastrointestinal:  Negative for diarrhea and heartburn.   Genitourinary:  Negative for bladder incontinence.   Neurological:  Negative for focal weakness, headaches, numbness, paresthesias and sensory change.   Psychiatric/Behavioral:  The patient is not nervous/anxious.    Allergic/Immunologic: Negative for persistent infections.       Objective:      Body mass index is 36.6 kg/m².  Vitals:    06/26/23 1343   Weight: 85 kg (187 lb 6.3 oz)   Height: 5' (1.524 m)         General    Constitutional: She is oriented to person, place, and time. She appears well-developed and well-nourished.   HENT:   Head: Normocephalic and atraumatic.   Eyes: EOM are normal.   Cardiovascular:  Normal rate.            Pulmonary/Chest: Effort normal.   Neurological: She is alert and oriented to person, place, and time.    Psychiatric: She has a normal mood and affect. Her behavior is normal.     General Musculoskeletal Exam   Gait: abnormal and antalgic   Pelvic Obliquity: none      Right Knee Exam     Inspection   Alignment:  normal  Effusion: absent    Left Knee Exam     Inspection   Alignment:  normal  Effusion: absent    Right Hip Exam     Inspection   Scars: absent  Swelling: absent  Bruising: absent  No deformity of hip.  Quadriceps Atrophy:  Negative  Erythema: absent    Range of Motion   Abduction:  15   Adduction:  10   Extension:  0   Flexion:  100   External rotation:  20   Internal rotation:  5     Tests   Pain w/ forced internal rotation (ROSE): absent  Pain w/ forced external rotation (FADIR): present  Stinchfield test: positive    Other   Sensation: normal    Comments:  Exam in chair.  She could not get onto the exam table.  Left Hip Exam     Inspection   Scars: absent  Swelling: absent  No deformity of hip.  Quadriceps Atrophy:  negative  Erythema: absent  Bruising: absent    Range of Motion   Abduction:  25   Adduction:  20   Extension:  0   Flexion:  100   External rotation:  30   Internal rotation: 25     Tests   Pain w/ forced internal rotation (ROSE): absent  Stinchfield test: negative    Other   Sensation: normal      Back (L-Spine & T-Spine) / Neck (C-Spine) Exam   Back exam is normal.      Muscle Strength   Right Lower Extremity   Hip Abduction: 5/5   Hip Adduction: 5/5   Hip Flexion: 5/5   Ankle Dorsiflexion:  5/5   Left Lower Extremity   Hip Abduction: 5/5   Hip Adduction: 5/5   Hip Flexion: 5/5   Ankle Dorsiflexion:  5/5     Reflexes     Left Side  Quadriceps:  2+    Right Side   Quadriceps:  2+    Vascular Exam     Right Pulses  Dorsalis Pedis:      2+          Left Pulses  Dorsalis Pedis:      2+          Capillary Refill  Right Hand: normal capillary refill  Left Hand: normal capillary refill        Edema  Right Upper Leg: absent  Left Upper Leg: absent    Radiographs taken today and reviewed by me   demonstrate severe arthritic change of the right hip(s).There  is not bone destruction.  There is not a fracture.  The visualized portion of the lumbar spine demonstrates severe arthritic change.           Assessment:       Encounter Diagnoses   Name Primary?    Primary osteoarthritis of right hip Yes    Lumbar radiculopathy     Dorsalgia, unspecified           Plan:       Daisy was seen today for pain.    Diagnoses and all orders for this visit:    Primary osteoarthritis of right hip    Lumbar radiculopathy    Dorsalgia, unspecified  -     MRI Lumbar Spine Without Contrast; Future        Although she has severe hip arthritis I believe there is a component of lumbar radicular pain.  I would like to get an MRI of her lumbar spine to better assess this and then come up with a definitive treatment plan.

## 2023-06-29 DIAGNOSIS — M16.11 PRIMARY OSTEOARTHRITIS OF RIGHT HIP: Primary | ICD-10-CM

## 2023-07-04 ENCOUNTER — PATIENT MESSAGE (OUTPATIENT)
Dept: ORTHOPEDICS | Facility: CLINIC | Age: 73
End: 2023-07-04
Payer: MEDICARE

## 2023-07-21 ENCOUNTER — PATIENT MESSAGE (OUTPATIENT)
Dept: ORTHOPEDICS | Facility: CLINIC | Age: 73
End: 2023-07-21
Payer: MEDICARE

## 2023-07-23 ENCOUNTER — PATIENT MESSAGE (OUTPATIENT)
Dept: ORTHOPEDICS | Facility: CLINIC | Age: 73
End: 2023-07-23
Payer: MEDICARE

## 2023-07-26 ENCOUNTER — PATIENT MESSAGE (OUTPATIENT)
Dept: ORTHOPEDICS | Facility: CLINIC | Age: 73
End: 2023-07-26
Payer: MEDICARE

## 2023-07-26 ENCOUNTER — TELEPHONE (OUTPATIENT)
Dept: ORTHOPEDICS | Facility: CLINIC | Age: 73
End: 2023-07-26
Payer: MEDICARE

## 2023-07-26 NOTE — TELEPHONE ENCOUNTER
Message with results sent to patient portal per patient's request. Patient acknowledged.    ----- Message from Manjit Carrillo MD sent at 7/26/2023  1:38 PM CDT -----  Regarding: RE: MRI results  Please call pt.  She has significant lumbar denerative changes which can be contributing ro her pain.  Please schedule an eval with spine  ----- Message -----  From: Cathy Cheung LPN  Sent: 7/26/2023   1:30 PM CDT  To: Manjit Carrillo MD  Subject: MRI results                                      I have uploaded patient's MRI report into the media section of chart. Patient called and would like results.    Thanks

## 2023-07-28 NOTE — PROGRESS NOTES
DATE: 8/9/2023  PATIENT: Daisy Severino    Supervising Physician: Robe Jeter M.D.    CHIEF COMPLAINT: low back pain    HISTORY:  Daisy Severino is a 73 y.o. female with pmhx of nephrectomy here for initial evaluation of low back and right leg pain (Back - 4, Leg - 6).  The pain in the right leg is what bothers her most.  The patient was recently evaluated by Dr. Carrillo for right hip arthropathy. He placed a referral to back and spine for further work up. The pain has been present for years and has progressively increased in severity over the past year. The patient describes the pain as sharp.  The pain is worse with walking and improved by sitting. There is associated numbness and tingling. There is right leg subjective weakness. The patient ambulates with a Rolator. Prior treatments have included Tylenol and Norco, but no PT, AMARJIT or surgery.    The patient denies myelopathic symptoms such as handwriting changes or difficulty with buttons/coins/keys. Denies perineal paresthesias, bowel/bladder dysfunction.    PAST MEDICAL/SURGICAL HISTORY:  Past Medical History:   Diagnosis Date    Abnormal Pap smear of cervix     Cancer of kidney     Hypertension     Renal carcinoma     left kidney stage 1 renal cell carcinoma, s/p L nephrectomy 4/16/2013    Sciatica      Past Surgical History:   Procedure Laterality Date    BREAST LUMPECTOMY  age 16    benign    CLOSED REDUCTION Right 1/8/2020    Procedure: CLOSED REDUCTION;  Surgeon: Karmen Surgeon;  Location: [facility];  Service: Anesthesiology;  Laterality: Right;    HYSTERECTOMY  4/1988    partial hysterectomy for heavy bleeding    kidney removal  4/2014    left kidney    NEPHRECTOMY Left     TONSILLECTOMY  1957       Medications:   Current Outpatient Medications on File Prior to Visit   Medication Sig Dispense Refill    blood sugar diagnostic (ACCU-CHEK FAVIOLA PLUS TEST STRP) Strp USE ONE STRIP TO CHECK GLUCOSE ONCE DAILY 50 strip 6    calcium carbonate (OS-REGAN) 600  mg calcium (1,500 mg) Tab Take 600 mg by mouth once.      clotrimazole-betamethasone 1-0.05% (LOTRISONE) cream APPLY TO AFFECTED AREA 2 TIMES DAILY 30 g 1    diazePAM (VALIUM) 2 MG tablet Take 1 tablet (2 mg total) by mouth every 12 (twelve) hours as needed (vertigo). 14 tablet 0    diclofenac sodium (VOLTAREN) 1 % Gel APPLY 2 GRAMS TOPICALLY TWICE DAILY AS NEEDED. FOR NECK AND SHOULDER PAIN - TOPICAL 100 g 11    estradioL (ESTRACE) 0.5 MG tablet Take 1 tablet (0.5 mg total) by mouth once daily. 90 tablet 1    estradioL (ESTRACE) 0.5 MG tablet Take 1 tablet (0.5 mg total) by mouth once daily. 90 tablet 2    famotidine (PEPCID) 20 MG tablet Take 1 tablet (20 mg total) by mouth once daily. As needed for acid reflux 90 tablet 3    fish oil-omega-3 fatty acids 300-1,000 mg capsule Take 2 g by mouth once daily.      HYDROcodone-acetaminophen (NORCO) 5-325 mg per tablet Take 1 tablet by mouth 4 (four) times daily as needed.      lancets (ACCU-CHEK FASTCLIX LANCET DRUM) Misc USE TO TEST BLOOD GLUCOSE ONCE DAILY 100 each 3    LIDOcaine (LIDODERM) 5 % 1 patch as needed.      loratadine (CLARITIN) 10 mg tablet Take 10 mg by mouth once daily.      losartan (COZAAR) 25 MG tablet Take 25 mg by mouth once daily. For total of 75mg daily      losartan (COZAAR) 50 MG tablet Take 1 tablet (50 mg total) by mouth once daily. 90 tablet 3    meclizine (ANTIVERT) 12.5 mg tablet Take 1 tablet (12.5 mg total) by mouth 2 (two) times daily as needed (vertigo). 28 tablet 1    multivitamin (THERAGRAN) per tablet Take 1 tablet by mouth once daily.      mupirocin (BACTROBAN) 2 % ointment Apply topically 2 (two) times daily. To area on ear 22 g 1    omeprazole (PRILOSEC) 40 MG capsule Take 40 mg by mouth once daily.      ondansetron (ZOFRAN) 4 MG tablet Take 1 tablet (4 mg total) by mouth every 12 (twelve) hours as needed (Nausea and vomiting). 14 tablet 1    ondansetron (ZOFRAN-ODT) 4 MG TbDL Take 1 tablet by mouth as needed.       triamterene-hydrochlorothiazide 37.5-25 mg (MAXZIDE-25) 37.5-25 mg per tablet Take 1 tablet by mouth once daily. 90 tablet 3    vitamin D 1000 units Tab Take 1,000 Units by mouth once daily.      fluconazole (DIFLUCAN) 100 MG tablet Take 1 tablet (100 mg total) by mouth once daily. (Patient not taking: Reported on 6/26/2023) 3 tablet 2    hydrocortisone 2.5 % cream Apply topically 2 (two) times daily. for 7 days 3.5 g 0     No current facility-administered medications on file prior to visit.       Social History:   Social History     Socioeconomic History    Marital status:    Tobacco Use    Smoking status: Never    Smokeless tobacco: Never   Substance and Sexual Activity    Alcohol use: Yes     Alcohol/week: 5.0 standard drinks of alcohol     Types: 5 Glasses of wine per week     Comment: occasional wine    Drug use: No    Sexual activity: Yes     Partners: Male     Birth control/protection: None   Social History Narrative    Previously ,  passed away. In relationship with boyfriend x 6 years. 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         Doing stretches at home.      Social Determinants of Health     Financial Resource Strain: Low Risk  (8/8/2023)    Overall Financial Resource Strain (CARDIA)     Difficulty of Paying Living Expenses: Not very hard   Food Insecurity: No Food Insecurity (8/8/2023)    Hunger Vital Sign     Worried About Running Out of Food in the Last Year: Never true     Ran Out of Food in the Last Year: Never true   Transportation Needs: No Transportation Needs (8/8/2023)    PRAPARE - Transportation     Lack of Transportation (Medical): No     Lack of Transportation (Non-Medical): No   Physical Activity: Inactive (8/8/2023)    Exercise Vital Sign     Days of Exercise per Week: 0 days     Minutes of Exercise per Session: 0 min   Stress: No Stress Concern Present (8/8/2023)    [employer] - Occupational Stress Questionnaire     Feeling of Stress :  Not at all   Social Connections: Unknown (8/8/2023)    Social Connection and Isolation Panel [NHANES]     Frequency of Communication with Friends and Family: Once a week     Frequency of Social Gatherings with Friends and Family: Once a week     Active Member of Clubs or Organizations: No     Attends Club or Organization Meetings: Never     Marital Status: Living with partner   Housing Stability: Low Risk  (8/8/2023)    Housing Stability Vital Sign     Unable to Pay for Housing in the Last Year: No     Number of Places Lived in the Last Year: 1     Unstable Housing in the Last Year: No       REVIEW OF SYSTEMS:  Constitution: Negative. Negative for chills, fever and night sweats.   Cardiovascular: Negative for chest pain and syncope.   Respiratory: Negative for cough and shortness of breath.   Gastrointestinal: See HPI. Negative for nausea/vomiting. Negative for abdominal pain.  Genitourinary: See HPI. Negative for discoloration or dysuria.  Skin: Negative for dry skin, itching and rash.   Hematologic/Lymphatic: Negative for bleeding problem. Does not bruise/bleed easily.   Musculoskeletal: Negative for falls and muscle weakness.   Neurological: See HPI. No seizures.   Endocrine: Negative for polydipsia, polyphagia and polyuria.   Allergic/Immunologic: Negative for hives and persistent infections.     EXAM:  Ht 5' (1.524 m)   Wt 85.2 kg (187 lb 11.6 oz)   LMP 04/01/1988   BMI 36.66 kg/m²     General: The patient is a 73 y.o. female in no apparent distress, the patient is oriented to person, place and time.  Psych: Normal mood and affect  HEENT: Vision grossly intact, hearing intact to the spoken word.  Lungs: Respirations unlabored.  Gait: antalgic station and gait, no difficulty with toe or heel walk.   Skin: Dorsal lumbar skin negative for rashes, lesions, hairy patches and surgical scars. There is mild lumbar tenderness to palpation.  Range of motion: Lumbar range of motion is acceptable.  Spinal Balance:  Global saggital and coronal spinal balance acceptable, not significant for scoliosis and kyphosis.  Musculoskeletal: No pain with the range of motion of the bilateral hips. No trochanteric tenderness to palpation.  Vascular: Bilateral lower extremities warm and well perfused, dorsalis pedis pulses 2+ bilaterally.  Neurological: Normal strength and tone in all major motor groups in the bilateral lower extremities. Normal sensation to light touch in the L2-S1 dermatomes bilaterally.  Deep tendon reflexes symmetric 2+ in the bilateral lower extremities.  Negative Babinski bilaterally. Straight leg raise negative bilaterally.    IMAGING:      Today I personally reviewed AP, Lat and Flex/Ex  upright L-spine films that demonstrate anterolisthesis of L4 on L5.     Lumbar MRI shows severe stenosis at T12/L1 with moderate stenosis at L1/2 and L3/4.      Body mass index is 36.66 kg/m².    Hemoglobin A1C   Date Value Ref Range Status   08/22/2022 6.0 (H) 4.0 - 5.6 % Final     Comment:     ADA Screening Guidelines:  5.7-6.4%  Consistent with prediabetes  >or=6.5%  Consistent with diabetes    High levels of fetal hemoglobin interfere with the HbA1C  assay. Heterozygous hemoglobin variants (HbS, HgC, etc)do  not significantly interfere with this assay.   However, presence of multiple variants may affect accuracy.     08/22/2022 6.0 (H) 4.0 - 5.6 % Final     Comment:     ADA Screening Guidelines:  5.7-6.4%  Consistent with prediabetes  >or=6.5%  Consistent with diabetes    High levels of fetal hemoglobin interfere with the HbA1C  assay. Heterozygous hemoglobin variants (HbS, HgC, etc)do  not significantly interfere with this assay.   However, presence of multiple variants may affect accuracy.     06/18/2021 5.9 (H) 4.0 - 5.6 % Final     Comment:     ADA Screening Guidelines:  5.7-6.4%  Consistent with prediabetes  >or=6.5%  Consistent with diabetes    High levels of fetal hemoglobin interfere with the HbA1C  assay. Heterozygous  hemoglobin variants (HbS, HgC, etc)do  not significantly interfere with this assay.   However, presence of multiple variants may affect accuracy.             ASSESSMENT/PLAN:    Daisy was seen today for low-back pain.    Diagnoses and all orders for this visit:    Lumbar radiculopathy  -     Ambulatory referral/consult to Physical/Occupational Therapy; Future    DDD (degenerative disc disease), lumbar  -     Ambulatory referral/consult to Physical/Occupational Therapy; Future    Spondylolisthesis of lumbar region  -     Ambulatory referral/consult to Physical/Occupational Therapy; Future      Today we discussed at length all of the different treatment options including anti-inflammatories, acetaminophen, rest, ice, heat, physical therapy including strengthening and stretching exercises, home exercises, ROM, aerobic conditioning, aqua therapy, other modalities including ultrasound, massage, and dry needling, epidural steroid injections and finally surgical intervention.  PT orders placed. The patient would like to hold off on a right TF AMARJIT for now. She will discuss taking Gabapentin 300mg nightly with her primary care and follow up with me with a decision.

## 2023-08-01 ENCOUNTER — PATIENT MESSAGE (OUTPATIENT)
Dept: ORTHOPEDICS | Facility: CLINIC | Age: 73
End: 2023-08-01
Payer: MEDICARE

## 2023-08-01 ENCOUNTER — TELEPHONE (OUTPATIENT)
Dept: ORTHOPEDICS | Facility: CLINIC | Age: 73
End: 2023-08-01
Payer: MEDICARE

## 2023-08-01 DIAGNOSIS — M51.36 DDD (DEGENERATIVE DISC DISEASE), LUMBAR: Primary | ICD-10-CM

## 2023-08-01 NOTE — TELEPHONE ENCOUNTER
Attempt to contact patient regarding x-ray. Left message for patient to return call back to [phone]. Thanks.

## 2023-08-09 ENCOUNTER — HOSPITAL ENCOUNTER (OUTPATIENT)
Dept: RADIOLOGY | Facility: HOSPITAL | Age: 73
Discharge: HOME | End: 2023-08-09
Attending: REGISTERED NURSE
Payer: MEDICARE

## 2023-08-09 ENCOUNTER — OFFICE VISIT (OUTPATIENT)
Dept: ORTHOPEDICS | Facility: CLINIC | Age: 73
End: 2023-08-09
Payer: MEDICARE

## 2023-08-09 VITALS — HEIGHT: 60 IN | WEIGHT: 187.75 LBS | BODY MASS INDEX: 36.86 KG/M2

## 2023-08-09 DIAGNOSIS — M51.36 DDD (DEGENERATIVE DISC DISEASE), LUMBAR: ICD-10-CM

## 2023-08-09 DIAGNOSIS — M43.16 SPONDYLOLISTHESIS OF LUMBAR REGION: ICD-10-CM

## 2023-08-09 DIAGNOSIS — M54.16 LUMBAR RADICULOPATHY: Primary | ICD-10-CM

## 2023-08-09 PROCEDURE — 3008F BODY MASS INDEX DOCD: CPT | Mod: CPTII,S$GLB,, | Performed by: REGISTERED NURSE

## 2023-08-09 PROCEDURE — 1101F PT FALLS ASSESS-DOCD LE1/YR: CPT | Mod: CPTII,S$GLB,, | Performed by: REGISTERED NURSE

## 2023-08-09 PROCEDURE — 1125F AMNT PAIN NOTED PAIN PRSNT: CPT | Mod: CPTII,S$GLB,, | Performed by: REGISTERED NURSE

## 2023-08-09 PROCEDURE — 4010F PR ACE/ARB THEARPY RXD/TAKEN: ICD-10-PCS | Mod: CPTII,S$GLB,, | Performed by: REGISTERED NURSE

## 2023-08-09 PROCEDURE — 72110 XR LUMBAR SPINE AP AND LAT WITH FLEX/EXT: ICD-10-PCS | Mod: 26,,, | Performed by: RADIOLOGY

## 2023-08-09 PROCEDURE — 1159F PR MEDICATION LIST DOCUMENTED IN MEDICAL RECORD: ICD-10-PCS | Mod: CPTII,S$GLB,, | Performed by: REGISTERED NURSE

## 2023-08-09 PROCEDURE — 1101F PR PT FALLS ASSESS DOC 0-1 FALLS W/OUT INJ PAST YR: ICD-10-PCS | Mod: CPTII,S$GLB,, | Performed by: REGISTERED NURSE

## 2023-08-09 PROCEDURE — 1159F MED LIST DOCD IN RCRD: CPT | Mod: CPTII,S$GLB,, | Performed by: REGISTERED NURSE

## 2023-08-09 PROCEDURE — 3008F PR BODY MASS INDEX (BMI) DOCUMENTED: ICD-10-PCS | Mod: CPTII,S$GLB,, | Performed by: REGISTERED NURSE

## 2023-08-09 PROCEDURE — 3288F PR FALLS RISK ASSESSMENT DOCUMENTED: ICD-10-PCS | Mod: CPTII,S$GLB,, | Performed by: REGISTERED NURSE

## 2023-08-09 PROCEDURE — 99999 PR PBB SHADOW E&M-EST. PATIENT-LVL V: CPT | Mod: PBBFAC,,, | Performed by: REGISTERED NURSE

## 2023-08-09 PROCEDURE — 72110 X-RAY EXAM L-2 SPINE 4/>VWS: CPT | Mod: TC

## 2023-08-09 PROCEDURE — 4010F ACE/ARB THERAPY RXD/TAKEN: CPT | Mod: CPTII,S$GLB,, | Performed by: REGISTERED NURSE

## 2023-08-09 PROCEDURE — 72110 X-RAY EXAM L-2 SPINE 4/>VWS: CPT | Mod: 26,,, | Performed by: RADIOLOGY

## 2023-08-09 PROCEDURE — 1125F PR PAIN SEVERITY QUANTIFIED, PAIN PRESENT: ICD-10-PCS | Mod: CPTII,S$GLB,, | Performed by: REGISTERED NURSE

## 2023-08-09 PROCEDURE — 99214 PR OFFICE/OUTPT VISIT, EST, LEVL IV, 30-39 MIN: ICD-10-PCS | Mod: S$GLB,,, | Performed by: REGISTERED NURSE

## 2023-08-09 PROCEDURE — 3288F FALL RISK ASSESSMENT DOCD: CPT | Mod: CPTII,S$GLB,, | Performed by: REGISTERED NURSE

## 2023-08-09 PROCEDURE — 99214 OFFICE O/P EST MOD 30 MIN: CPT | Mod: S$GLB,,, | Performed by: REGISTERED NURSE

## 2023-08-09 PROCEDURE — 99999 PR PBB SHADOW E&M-EST. PATIENT-LVL V: ICD-10-PCS | Mod: PBBFAC,,, | Performed by: REGISTERED NURSE

## 2023-08-16 ENCOUNTER — PATIENT MESSAGE (OUTPATIENT)
Dept: ORTHOPEDICS | Facility: CLINIC | Age: 73
End: 2023-08-16
Payer: MEDICARE

## 2023-08-17 ENCOUNTER — PATIENT MESSAGE (OUTPATIENT)
Dept: ORTHOPEDICS | Facility: CLINIC | Age: 73
End: 2023-08-17
Payer: MEDICARE

## 2023-08-17 DIAGNOSIS — M51.36 DDD (DEGENERATIVE DISC DISEASE), LUMBAR: Primary | ICD-10-CM

## 2023-08-17 RX ORDER — LIDOCAINE 50 MG/G
1 PATCH TOPICAL
Qty: 30 PATCH | Refills: 6 | Status: SHIPPED | OUTPATIENT
Start: 2023-08-17

## 2023-08-28 ENCOUNTER — PATIENT MESSAGE (OUTPATIENT)
Dept: ORTHOPEDICS | Facility: CLINIC | Age: 73
End: 2023-08-28
Payer: MEDICARE

## 2023-08-31 ENCOUNTER — PATIENT MESSAGE (OUTPATIENT)
Dept: ORTHOPEDICS | Facility: CLINIC | Age: 73
End: 2023-08-31
Payer: MEDICARE

## 2023-10-15 RX ORDER — ESTRADIOL 0.5 MG/1
0.5 TABLET ORAL DAILY
Qty: 90 TABLET | Refills: 2 | Status: SHIPPED | OUTPATIENT
Start: 2023-10-15 | End: 2024-10-14

## 2024-09-29 RX ORDER — ESTRADIOL 0.5 MG/1
0.5 TABLET ORAL
Qty: 90 TABLET | Refills: 2 | Status: SHIPPED | OUTPATIENT
Start: 2024-09-29

## 2025-02-01 ENCOUNTER — HOSPITAL ENCOUNTER (OUTPATIENT)
Dept: RADIOLOGY | Facility: HOSPITAL | Age: 75
Discharge: HOME | End: 2025-02-01
Attending: INTERNAL MEDICINE
Payer: MEDICARE

## 2025-02-01 DIAGNOSIS — Z85.528 HISTORY OF RENAL CARCINOMA: ICD-10-CM

## 2025-02-01 DIAGNOSIS — R10.9 RIGHT FLANK PAIN: ICD-10-CM

## 2025-02-01 DIAGNOSIS — Z90.5 HISTORY OF LEFT NEPHRECTOMY: ICD-10-CM

## 2025-02-01 PROCEDURE — 76700 US EXAM ABDOM COMPLETE: CPT | Mod: TC

## 2025-02-01 PROCEDURE — 76700 US EXAM ABDOM COMPLETE: CPT | Mod: 26,,, | Performed by: RADIOLOGY

## 2025-02-28 ENCOUNTER — TELEPHONE (OUTPATIENT)
Dept: UROLOGY | Facility: CLINIC | Age: 75
End: 2025-02-28
Payer: MEDICARE

## 2025-02-28 DIAGNOSIS — C64.9 CANCER OF KIDNEY, UNSPECIFIED LATERALITY: Primary | ICD-10-CM

## 2025-02-28 NOTE — TELEPHONE ENCOUNTER
Patient would like to know where Dr. Ramirez sends nephrology consults- information for [facility] given.  ----- Message from Estelle sent at 2/28/2025 11:16 AM CST -----  Regarding: Consult  Advisory  Contact: SHAQUILLE DAVID [[id number]]  CONSULT/ADVISORYName of Caller: SHAQUILLE DAVID [[id number]]Contact Preference:  [phone] of Call:  Pt wants this message to go to Dr. Ramirez.  She is a former pt  since 2013 did her kidney cancer surgery.   Pt would like to have Dr. Ramirez call her.  Thank you.

## 2025-03-28 DIAGNOSIS — Z90.5 HISTORY OF LEFT NEPHRECTOMY: ICD-10-CM

## 2025-03-28 DIAGNOSIS — I10 HYPERTENSION: ICD-10-CM

## 2025-03-28 DIAGNOSIS — N18.31 STAGE 3A CHRONIC KIDNEY DISEASE (CKD): Primary | ICD-10-CM

## 2025-03-28 DIAGNOSIS — Z12.31 ENCOUNTER FOR SCREENING MAMMOGRAM FOR MALIGNANT NEOPLASM OF BREAST: ICD-10-CM

## 2025-03-28 DIAGNOSIS — Z12.31 BREAST CANCER SCREENING BY MAMMOGRAM: Primary | ICD-10-CM

## 2025-04-23 ENCOUNTER — TELEPHONE (OUTPATIENT)
Dept: UROLOGY | Facility: CLINIC | Age: 75
End: 2025-04-23
Payer: MEDICARE

## 2025-04-23 NOTE — TELEPHONE ENCOUNTER
LVM. See previous note.  ----- Message from CELIA Gonzáles sent at 4/23/2025  1:17 PM CDT -----  Regarding: FW: Call requested  Contact: [phone]    ----- Message -----  From: [place]  Sent: 4/23/2025  12:09 PM CDT  To: James Ying Staff  Subject: Call requested                                   Hi,Who called:The pt Reason:Pt would like to spk with the nurse to discuss getting information about Providers Dr. Sarabia's prefer at [facility]. Pls call the pt at [phone].Provider's name:Dr. RamirezAdditional Information:Thank you.

## 2025-04-23 NOTE — TELEPHONE ENCOUNTER
Spoke with patient  ----- Message from Erica sent at 4/23/2025  1:44 PM CDT -----  Type:  Patient Returning CallWho Called: Who Left Message for Patient: Does the patient know what this is regarding?: missed call Best Call Back Number:   [phone] Information:

## 2025-05-09 DIAGNOSIS — I10 ESSENTIAL HYPERTENSION: Primary | ICD-10-CM

## 2025-05-13 DIAGNOSIS — C64.9 CANCER OF KIDNEY, UNSPECIFIED LATERALITY: Primary | ICD-10-CM

## 2025-05-16 ENCOUNTER — LAB VISIT (OUTPATIENT)
Dept: LAB | Facility: HOSPITAL | Age: 75
End: 2025-05-16
Payer: MEDICARE

## 2025-05-16 DIAGNOSIS — I10 ESSENTIAL HYPERTENSION: ICD-10-CM

## 2025-05-16 DIAGNOSIS — C64.9 CANCER OF KIDNEY, UNSPECIFIED LATERALITY: ICD-10-CM

## 2025-05-16 LAB
ABSOLUTE EOSINOPHIL (OHS): 0.14 K/UL
ABSOLUTE MONOCYTE (OHS): 0.78 K/UL (ref 0.3–1)
ABSOLUTE NEUTROPHIL COUNT (OHS): 5.69 K/UL (ref 1.8–7.7)
ALBUMIN SERPL BCP-MCNC: 3.8 G/DL (ref 3.5–5.2)
ALBUMIN/CREAT UR: NORMAL
ANION GAP (OHS): 13 MMOL/L (ref 8–16)
BASOPHILS # BLD AUTO: 0.06 K/UL
BASOPHILS NFR BLD AUTO: 0.7 %
BUN SERPL-MCNC: 30 MG/DL (ref 8–23)
CALCIUM SERPL-MCNC: 9.6 MG/DL (ref 8.7–10.5)
CHLORIDE SERPL-SCNC: 103 MMOL/L (ref 95–110)
CO2 SERPL-SCNC: 23 MMOL/L (ref 23–29)
CREAT SERPL-MCNC: 1.6 MG/DL (ref 0.5–1.4)
CREAT UR-MCNC: 62 MG/DL (ref 15–325)
ERYTHROCYTE [DISTWIDTH] IN BLOOD BY AUTOMATED COUNT: 13.6 % (ref 11.5–14.5)
FERRITIN SERPL-MCNC: 52 NG/ML (ref 20–300)
GFR SERPLBLD CREATININE-BSD FMLA CKD-EPI: 34 ML/MIN/1.73/M2
GLUCOSE SERPL-MCNC: 105 MG/DL (ref 70–110)
HCT VFR BLD AUTO: 47.8 % (ref 37–48.5)
HGB BLD-MCNC: 14.2 GM/DL (ref 12–16)
IMM GRANULOCYTES # BLD AUTO: 0.04 K/UL (ref 0–0.04)
IMM GRANULOCYTES NFR BLD AUTO: 0.4 % (ref 0–0.5)
IRON SATN MFR SERPL: 14 % (ref 20–50)
IRON SERPL-MCNC: 58 UG/DL (ref 30–160)
LYMPHOCYTES # BLD AUTO: 2.27 K/UL (ref 1–4.8)
MCH RBC QN AUTO: 27.2 PG (ref 27–31)
MCHC RBC AUTO-ENTMCNC: 29.7 G/DL (ref 32–36)
MCV RBC AUTO: 92 FL (ref 82–98)
MICROALBUMIN UR-MCNC: <5 UG/ML (ref ?–5000)
NUCLEATED RBC (/100WBC) (OHS): 0 /100 WBC
PHOSPHATE SERPL-MCNC: 3.8 MG/DL (ref 2.7–4.5)
PLATELET # BLD AUTO: 297 K/UL (ref 150–450)
PMV BLD AUTO: 10.8 FL (ref 9.2–12.9)
POTASSIUM SERPL-SCNC: 5.1 MMOL/L (ref 3.5–5.1)
PTH-INTACT SERPL-MCNC: 86.2 PG/ML (ref 9–77)
RBC # BLD AUTO: 5.22 M/UL (ref 4–5.4)
RELATIVE EOSINOPHIL (OHS): 1.6 %
RELATIVE LYMPHOCYTE (OHS): 25.3 % (ref 18–48)
RELATIVE MONOCYTE (OHS): 8.7 % (ref 4–15)
RELATIVE NEUTROPHIL (OHS): 63.3 % (ref 38–73)
SODIUM SERPL-SCNC: 139 MMOL/L (ref 136–145)
TIBC SERPL-MCNC: 426 UG/DL (ref 250–450)
TRANSFERRIN SERPL-MCNC: 288 MG/DL (ref 200–375)
WBC # BLD AUTO: 8.98 K/UL (ref 3.9–12.7)

## 2025-05-16 PROCEDURE — 36415 COLL VENOUS BLD VENIPUNCTURE: CPT

## 2025-05-16 PROCEDURE — 82040 ASSAY OF SERUM ALBUMIN: CPT

## 2025-05-16 PROCEDURE — 83540 ASSAY OF IRON: CPT

## 2025-05-16 PROCEDURE — 82570 ASSAY OF URINE CREATININE: CPT

## 2025-05-16 PROCEDURE — 82728 ASSAY OF FERRITIN: CPT

## 2025-05-16 PROCEDURE — 83970 ASSAY OF PARATHORMONE: CPT

## 2025-05-16 PROCEDURE — 85025 COMPLETE CBC W/AUTO DIFF WBC: CPT

## 2025-05-20 ENCOUNTER — OFFICE VISIT (OUTPATIENT)
Dept: NEPHROLOGY | Facility: CLINIC | Age: 75
End: 2025-05-20
Payer: MEDICARE

## 2025-05-20 VITALS
DIASTOLIC BLOOD PRESSURE: 81 MMHG | OXYGEN SATURATION: 97 % | HEIGHT: 60 IN | SYSTOLIC BLOOD PRESSURE: 141 MMHG | BODY MASS INDEX: 36.53 KG/M2 | HEART RATE: 61 BPM | WEIGHT: 186.06 LBS

## 2025-05-20 DIAGNOSIS — Z85.528 HISTORY OF RENAL CARCINOMA: ICD-10-CM

## 2025-05-20 DIAGNOSIS — E66.01 SEVERE OBESITY (BMI 35.0-39.9) WITH COMORBIDITY: ICD-10-CM

## 2025-05-20 DIAGNOSIS — N25.81 SECONDARY HYPERPARATHYROIDISM OF RENAL ORIGIN: ICD-10-CM

## 2025-05-20 DIAGNOSIS — I10 ESSENTIAL HYPERTENSION: ICD-10-CM

## 2025-05-20 DIAGNOSIS — D63.1 ANEMIA IN STAGE 4 CHRONIC KIDNEY DISEASE: ICD-10-CM

## 2025-05-20 DIAGNOSIS — N18.32 CKD STAGE 3B, GFR 30-44 ML/MIN: Primary | ICD-10-CM

## 2025-05-20 DIAGNOSIS — N18.4 ANEMIA IN STAGE 4 CHRONIC KIDNEY DISEASE: ICD-10-CM

## 2025-05-20 PROBLEM — C64.2 RENAL CELL CARCINOMA OF LEFT KIDNEY: Status: RESOLVED | Noted: 2021-08-04 | Resolved: 2025-05-20

## 2025-05-20 PROBLEM — C64.2 RENAL CELL CARCINOMA OF LEFT KIDNEY: Status: ACTIVE | Noted: 2021-08-04

## 2025-05-20 PROCEDURE — 3066F NEPHROPATHY DOC TX: CPT | Mod: CPTII,S$GLB,,

## 2025-05-20 PROCEDURE — 99999 PR PBB SHADOW E&M-EST. PATIENT-LVL V: CPT | Mod: PBBFAC,,,

## 2025-05-20 PROCEDURE — 3077F SYST BP >= 140 MM HG: CPT | Mod: CPTII,S$GLB,,

## 2025-05-20 PROCEDURE — 1125F AMNT PAIN NOTED PAIN PRSNT: CPT | Mod: CPTII,S$GLB,,

## 2025-05-20 PROCEDURE — 1160F RVW MEDS BY RX/DR IN RCRD: CPT | Mod: CPTII,S$GLB,,

## 2025-05-20 PROCEDURE — 3288F FALL RISK ASSESSMENT DOCD: CPT | Mod: CPTII,S$GLB,,

## 2025-05-20 PROCEDURE — 3061F NEG MICROALBUMINURIA REV: CPT | Mod: CPTII,S$GLB,,

## 2025-05-20 PROCEDURE — 3079F DIAST BP 80-89 MM HG: CPT | Mod: CPTII,S$GLB,,

## 2025-05-20 PROCEDURE — 3008F BODY MASS INDEX DOCD: CPT | Mod: CPTII,S$GLB,,

## 2025-05-20 PROCEDURE — G2211 COMPLEX E/M VISIT ADD ON: HCPCS | Mod: S$GLB,,,

## 2025-05-20 PROCEDURE — 4010F ACE/ARB THERAPY RXD/TAKEN: CPT | Mod: CPTII,S$GLB,,

## 2025-05-20 PROCEDURE — 99204 OFFICE O/P NEW MOD 45 MIN: CPT | Mod: S$GLB,,,

## 2025-05-20 PROCEDURE — 1159F MED LIST DOCD IN RCRD: CPT | Mod: CPTII,S$GLB,,

## 2025-05-20 PROCEDURE — 1101F PT FALLS ASSESS-DOCD LE1/YR: CPT | Mod: CPTII,S$GLB,,

## 2025-05-20 RX ORDER — FERROUS SULFATE 325(65) MG
325 TABLET ORAL DAILY
Qty: 60 TABLET | Refills: 2 | Status: SHIPPED | OUTPATIENT
Start: 2025-05-20 | End: 2025-11-16

## 2025-05-20 NOTE — PROGRESS NOTES
Nephrology Clinic Note     VISIT TYPE: New referral    REASON FOR REFERRAL: CKD 3ab    REFERRING PHYSICIAN: Lizy, Primary Doctor      HISTORY OF PRESENT ILLNESS: 75 y.o. female who  has a past medical history of Abnormal Pap smear of cervix, Cancer of kidney, Hypertension, Renal carcinoma, and Sciatica. Presents for a new consultation for evaluation of elevated serum creatinine and presumed chronic kidney disease. The patient was found to have an elevated serum creatinine, at 1.6 mg/dL.     The patient denies SOB, LE edema, hematuria or foamy urine.     The patient denies taking NSAIDs or new antibiotics, recreational drugs, recent episode of dehydration, diarrhea, nausea or vomiting, acute illness, hospitalization or exposure to IV radiocontrast.     Not checking BP regularly       ROS:  General: negative for chills, or fatigue  ENT: No epistaxis or headaches  Hematological and Lymphatic: No bleeding problems or blood clots.  Endocrine: No skin changes or temperature intolerance  Respiratory: No cough, shortness of breath, or wheezing  Cardiovascular: No chest pain or dyspnea   Gastrointestinal: No abdominal pain, change in bowel habits  Genito-Urinary: No dysuria, trouble voiding, or hematuria  Musculoskeletal: ROS: negative for - joint pain, joint stiffness, joint swelling, muscle pain or muscular weakness  Neurological: No focal weakness, no numbness  Dermatological: No rash or ulcers.    PAST MEDICAL HISTORY:  Past Medical History:   Diagnosis Date    Abnormal Pap smear of cervix     Cancer of kidney     Hypertension     Renal carcinoma     left kidney stage 1 renal cell carcinoma, s/p L nephrectomy 4/16/2013    Sciatica        PAST SURGICAL HISTORY:  Past Surgical History:   Procedure Laterality Date    BREAST LUMPECTOMY  age 16    benign    CLOSED REDUCTION Right 1/8/2020    Procedure: CLOSED REDUCTION;  Surgeon: Karmen Surgeon;  Location: [facility];  Service: Anesthesiology;  Laterality: Right;    HYSTERECTOMY   4/1988    partial hysterectomy for heavy bleeding    kidney removal  4/2014    left kidney    NEPHRECTOMY Left     TONSILLECTOMY  1957       FAMILY HISTORY:   Family History   Problem Relation Name Age of Onset    Cancer Father Sanjeev Hernandez 61        bladder cancer, lung cancer (smoker)    Breast cancer Maternal Aunt      Breast cancer Maternal Aunt      Diabetes Maternal Grandmother Ness Naty     Diabetes Paternal Grandmother Dilma Hernandez     Cancer Paternal Grandfather          head & neck cancer (smoker)       SOCIAL HISTORY:  Social History[1]    ALLERGIES:  Review of patient's allergies indicates:   Allergen Reactions    Codeine Nausea And Vomiting    Latex Rash    Meperidine Nausea And Vomiting       MEDICATIONS:  Current Medications[2]   Medication List with Changes/Refills   Current Medications    BLOOD SUGAR DIAGNOSTIC (ACCU-CHEK FAVIOLA PLUS TEST STRP) STRP    USE ONE STRIP TO CHECK GLUCOSE ONCE DAILY    CALCIUM CARBONATE (OS-REGAN) 600 MG CALCIUM (1,500 MG) TAB    Take 600 mg by mouth once.    CLOTRIMAZOLE-BETAMETHASONE 1-0.05% (LOTRISONE) CREAM    APPLY TO AFFECTED AREA 2 TIMES DAILY    DIAZEPAM (VALIUM) 2 MG TABLET    Take 1 tablet (2 mg total) by mouth every 12 (twelve) hours as needed (vertigo).    DICLOFENAC SODIUM (VOLTAREN) 1 % GEL    APPLY 2 GRAMS TOPICALLY TWICE DAILY AS NEEDED. FOR NECK AND SHOULDER PAIN - TOPICAL    ESTRADIOL (ESTRACE) 0.5 MG TABLET    Take 1 tablet (0.5 mg total) by mouth once daily.    ESTRADIOL (ESTRACE) 0.5 MG TABLET    TAKE 1 TABLET (0.5 MG TOTAL) BY MOUTH ONCE DAILY.    FAMOTIDINE (PEPCID) 20 MG TABLET    Take 1 tablet (20 mg total) by mouth once daily. As needed for acid reflux    FISH OIL-OMEGA-3 FATTY ACIDS 300-1,000 MG CAPSULE    Take 2 g by mouth once daily.    FLUCONAZOLE (DIFLUCAN) 100 MG TABLET    Take 1 tablet (100 mg total) by mouth once daily.    HYDROCODONE-ACETAMINOPHEN (NORCO) 5-325 MG PER TABLET    Take 1 tablet by mouth 4 (four) times daily as needed.     HYDROCORTISONE 2.5 % CREAM    Apply topically 2 (two) times daily. for 7 days    LANCETS (ACCU-CHEK FASTCLIX LANCET DRUM) MISC    USE TO TEST BLOOD GLUCOSE ONCE DAILY    LIDOCAINE (LIDODERM) 5 %    Place 1 patch onto the skin as needed (pain).    LORATADINE (CLARITIN) 10 MG TABLET    Take 10 mg by mouth once daily.    LOSARTAN (COZAAR) 25 MG TABLET    Take 25 mg by mouth once daily. For total of 75mg daily    LOSARTAN (COZAAR) 50 MG TABLET    Take 1 tablet (50 mg total) by mouth once daily.    MECLIZINE (ANTIVERT) 12.5 MG TABLET    Take 1 tablet (12.5 mg total) by mouth 2 (two) times daily as needed (vertigo).    MULTIVITAMIN (THERAGRAN) PER TABLET    Take 1 tablet by mouth once daily.    MUPIROCIN (BACTROBAN) 2 % OINTMENT    Apply topically 2 (two) times daily. To area on ear    OMEPRAZOLE (PRILOSEC) 40 MG CAPSULE    Take 40 mg by mouth once daily.    ONDANSETRON (ZOFRAN) 4 MG TABLET    Take 1 tablet (4 mg total) by mouth every 12 (twelve) hours as needed (Nausea and vomiting).    ONDANSETRON (ZOFRAN-ODT) 4 MG TBDL    Take 1 tablet by mouth as needed.    TRIAMTERENE-HYDROCHLOROTHIAZIDE 37.5-25 MG (MAXZIDE-25) 37.5-25 MG PER TABLET    Take 1 tablet by mouth once daily.    VITAMIN D 1000 UNITS TAB    Take 1,000 Units by mouth once daily.        PHYSICAL EXAM:  [facility] 04/01/1988     General: No distress, No fever or chills  Head: Normocephalic,atraumatic  Eyes: conjunctivae/corneas clear. PERRL, EOM's intact.  Nose: Nares normal. Mucosa normal. No drainage or sinus tenderness.  Neck: No adenopathy,no carotid bruit,no JVD  Lungs:Clear to auscultation bilaterally, No Crackles  Heart: Regular rate and rhythm, no murmur, gallops or rubs  Abdomen: Soft, no tenderness, bowel sounds normal  Extremities: Atraumatic, no edema in LE  Skin: Turgor normal. No rashes or ulcers  Neurologic: No focal weakness, oriented.          LABS:  Lab Results   Component Value Date    CREATININE 1.6 (H) 05/16/2025       eGFR   Date Value Ref  "Range Status   05/16/2025 34 (L) >60 mL/min/1.73/m2 Final     Comment:     Estimated GFR calculated using the CKD-EPI creatinine (2021) equation.   08/22/2022 >60.0 >60 mL/min/1.73 m^2 Final       Microalbumin/Creatinine Ratio Urine   Date Value Ref Range Status   05/16/2025   Final     Comment:     UNABLE TO CALCULATE       No results found for: "UTPCR"    Lab Results   Component Value Date     05/16/2025    K 5.1 05/16/2025    CO2 23 05/16/2025     05/16/2025       Lab Results   Component Value Date    PTH 86.2 (H) 05/16/2025    CALCIUM 9.6 05/16/2025    PHOS 3.8 05/16/2025       Lab Results   Component Value Date    HGB 14.2 05/16/2025        Iron Level   Date Value Ref Range Status   05/16/2025 58 30 - 160 ug/dL Final     Ferritin   Date Value Ref Range Status   05/16/2025 52.0 20.0 - 300.0 ng/mL Final       Lab Results   Component Value Date    HGBA1C 5.9 10/10/2023       Lab Results   Component Value Date    LDLCALC 110.6 08/22/2022         IMAGING STUDIES  Kidney US: Reviewed  Echocardiogram: Reviewed    ASSESSMENT AND PLAN:    CKD stage 3bA1  Etiology: likely secondary to nephrons loss and longstanding HTN   Hx of RCC s/p left total nephrectomy in 2013  Baseline Cr was around 1.1 mg/dl till 2023. No RFP for the last 2 years, now Cr 1.6 mg/dl.   eGFR 34 ml/min    Proteinuria: no  ACEi/ARB: yes  SGLT2i: no  MRA: no  GLP1a: no  Kidney Failure Risk at 2 years: Unavailable  Kidney Failure Risk at 5 years: Unavailable    Plan:  Continue current medications  Advised to avoid NSAID    Volume status  euvolemic  Diuretics: yes     Plan:  No intervention needed    HTN  Suboptimally controlled   Current medications: losartan 50 mg, and triameteren-HCTZ 37.5-25 mg daily   Not complaint to low salt diet     Plan:  Continue current regimen / No change  Patient will monitor home BP for 2 weeks and send log - Decision regarding management will be made based off results  Restrict sodium intake to less than 2 gm " per day    Anemia in CKD   Hb at target  Fe % not at target   Ferritin not at target   Current therapy: none    Plan:  Will start ferrous sulfate 325 mg daily     CKD-MBD  PTH not at target  Ca not at target  PO4 not at target  Vit D no recent result  Current therapy: calcium carbonate and vit D    Plan:  Continue current medications  Will monitor  New vit D level for next visit     Acid-Base disorders   at target  not on sodium bicarb    Plan:  Continue monitoring     Electrolytes   at target   K 5.1       Plan:  Avoid high potassium food     DM  Not present, prediabetic       Cardiovascular   Statin: No  Aspirin: No    Smoking  No         RTC in 4 months      PLAN SUMMARY:  Ferrous sulfate daily     The diagnosis and management plan explained to the patient in details, she understood and agreed.    Visit today included increased complexity associated with the care of the episodic problem CKD3b, HTN, and anemia in CKD addressed and managing the longitudinal care of the patient due to the serious and/or complex managed problem(s).     Yarelis Alcantara MD  Nephrologist            [1]   Social History  Socioeconomic History    Marital status:    Tobacco Use    Smoking status: Never    Smokeless tobacco: Never   Substance and Sexual Activity    Alcohol use: Yes     Alcohol/week: 5.0 standard drinks of alcohol     Types: 5 Glasses of wine per week     Comment: occasional wine    Drug use: No    Sexual activity: Yes     Partners: Male     Birth control/protection: None   Social History Narrative    Previously ,  passed away. In relationship with boyfriend x 6 years. 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         Doing stretches at home.      Social Drivers of Health     Financial Resource Strain: Low Risk  (5/17/2025)    Overall Financial Resource Strain (CARDIA)     Difficulty of Paying Living Expenses: Not very hard   Food Insecurity: No Food Insecurity (5/17/2025)    Hunger Vital Sign      Worried About Running Out of Food in the Last Year: Never true     Ran Out of Food in the Last Year: Never true   Transportation Needs: No Transportation Needs (5/17/2025)    PRAPARE - Transportation     Lack of Transportation (Medical): No     Lack of Transportation (Non-Medical): No   Physical Activity: Patient Declined (5/17/2025)    Exercise Vital Sign     Days of Exercise per Week: Patient declined     Minutes of Exercise per Session: Patient declined   Stress: No Stress Concern Present (5/17/2025)    [employer] [place] [employer] - Occupational Stress Questionnaire     Feeling of Stress : Only a little   Housing Stability: Low Risk  (5/17/2025)    Housing Stability Vital Sign     Unable to Pay for Housing in the Last Year: No     Homeless in the Last Year: No   [2]   Current Outpatient Medications:     blood sugar diagnostic (ACCU-CHEK FAVIOLA PLUS TEST STRP) Strp, USE ONE STRIP TO CHECK GLUCOSE ONCE DAILY, Disp: 50 strip, Rfl: 6    calcium carbonate (OS-REGAN) 600 mg calcium (1,500 mg) Tab, Take 600 mg by mouth once., Disp: , Rfl:     clotrimazole-betamethasone 1-0.05% (LOTRISONE) cream, APPLY TO AFFECTED AREA 2 TIMES DAILY, Disp: 30 g, Rfl: 1    diazePAM (VALIUM) 2 MG tablet, Take 1 tablet (2 mg total) by mouth every 12 (twelve) hours as needed (vertigo)., Disp: 14 tablet, Rfl: 0    diclofenac sodium (VOLTAREN) 1 % Gel, APPLY 2 GRAMS TOPICALLY TWICE DAILY AS NEEDED. FOR NECK AND SHOULDER PAIN - TOPICAL, Disp: 100 g, Rfl: 11    estradioL (ESTRACE) 0.5 MG tablet, Take 1 tablet (0.5 mg total) by mouth once daily., Disp: 90 tablet, Rfl: 1    estradioL (ESTRACE) 0.5 MG tablet, TAKE 1 TABLET (0.5 MG TOTAL) BY MOUTH ONCE DAILY., Disp: 90 tablet, Rfl: 2    famotidine (PEPCID) 20 MG tablet, Take 1 tablet (20 mg total) by mouth once daily. As needed for acid reflux, Disp: 90 tablet, Rfl: 3    fish oil-omega-3 fatty acids 300-1,000 mg capsule, Take 2 g by mouth once daily., Disp: , Rfl:     fluconazole  (DIFLUCAN) 100 MG tablet, Take 1 tablet (100 mg total) by mouth once daily. (Patient not taking: Reported on 6/26/2023), Disp: 3 tablet, Rfl: 2    HYDROcodone-acetaminophen (NORCO) 5-325 mg per tablet, Take 1 tablet by mouth 4 (four) times daily as needed., Disp: , Rfl:     hydrocortisone 2.5 % cream, Apply topically 2 (two) times daily. for 7 days, Disp: 3.5 g, Rfl: 0    lancets (ACCU-CHEK FASTCLIX LANCET DRUM) [facility] [place], USE TO TEST BLOOD GLUCOSE ONCE DAILY, Disp: 100 each, Rfl: 3    LIDOcaine (LIDODERM) 5 %, Place 1 patch onto the skin as needed (pain)., Disp: 30 patch, Rfl: 6    loratadine (CLARITIN) 10 mg tablet, Take 10 mg by mouth once daily., Disp: , Rfl:     losartan (COZAAR) 25 MG tablet, Take 25 mg by mouth once daily. For total of 75mg daily, Disp: , Rfl:     losartan (COZAAR) 50 MG tablet, Take 1 tablet (50 mg total) by mouth once daily., Disp: 90 tablet, Rfl: 3    meclizine (ANTIVERT) 12.5 mg tablet, Take 1 tablet (12.5 mg total) by mouth 2 (two) times daily as needed (vertigo)., Disp: 28 tablet, Rfl: 1    multivitamin (THERAGRAN) per tablet, Take 1 tablet by mouth once daily., Disp: , Rfl:     mupirocin (BACTROBAN) 2 % ointment, Apply topically 2 (two) times daily. To area on ear, Disp: 22 g, Rfl: 1    omeprazole (PRILOSEC) 40 MG capsule, Take 40 mg by mouth once daily., Disp: , Rfl:     ondansetron (ZOFRAN) 4 MG tablet, Take 1 tablet (4 mg total) by mouth every 12 (twelve) hours as needed (Nausea and vomiting)., Disp: 14 tablet, Rfl: 1    ondansetron (ZOFRAN-ODT) 4 MG TbDL, Take 1 tablet by mouth as needed., Disp: , Rfl:     triamterene-hydrochlorothiazide 37.5-25 mg (MAXZIDE-25) 37.5-25 mg per tablet, Take 1 tablet by mouth once daily., Disp: 90 tablet, Rfl: 3    vitamin D 1000 units Tab, Take 1,000 Units by mouth once daily., Disp: , Rfl:

## 2025-06-25 ENCOUNTER — HOSPITAL ENCOUNTER (OUTPATIENT)
Dept: RADIOLOGY | Facility: HOSPITAL | Age: 75
Discharge: HOME | End: 2025-06-25
Payer: MEDICARE

## 2025-06-25 DIAGNOSIS — Z12.31 ENCOUNTER FOR SCREENING MAMMOGRAM FOR BREAST CANCER: ICD-10-CM

## 2025-06-25 PROCEDURE — 77067 SCR MAMMO BI INCL CAD: CPT | Mod: TC

## 2025-08-21 RX ORDER — ESTRADIOL 0.5 MG/1
0.5 TABLET ORAL
Qty: 90 TABLET | Refills: 2 | Status: SHIPPED | OUTPATIENT
Start: 2025-08-21